# Patient Record
Sex: FEMALE | Race: WHITE | NOT HISPANIC OR LATINO | Employment: OTHER | URBAN - METROPOLITAN AREA
[De-identification: names, ages, dates, MRNs, and addresses within clinical notes are randomized per-mention and may not be internally consistent; named-entity substitution may affect disease eponyms.]

---

## 2017-02-12 ENCOUNTER — HOSPITAL ENCOUNTER (EMERGENCY)
Facility: HOSPITAL | Age: 67
Discharge: HOME | End: 2017-02-13
Attending: EMERGENCY MEDICINE | Admitting: EMERGENCY MEDICINE

## 2017-02-12 DIAGNOSIS — N20.1 URETEROLITHIASIS: Primary | ICD-10-CM

## 2017-02-12 PROCEDURE — 99283 EMERGENCY DEPT VISIT LOW MDM: CPT

## 2017-02-12 PROCEDURE — 93005 ELECTROCARDIOGRAM TRACING: CPT | Performed by: EMERGENCY MEDICINE

## 2017-02-12 RX ORDER — SODIUM CHLORIDE 0.9 % (FLUSH) 0.9 %
10 SYRINGE (ML) INJECTION AS NEEDED
Status: DISCONTINUED | OUTPATIENT
Start: 2017-02-12 | End: 2017-02-13 | Stop reason: HOSPADM

## 2017-02-12 RX ORDER — ALOGLIPTIN AND PIOGLITAZONE 25; 30 MG/1; MG/1
1 TABLET, FILM COATED ORAL DAILY
COMMUNITY
End: 2019-01-30 | Stop reason: HOSPADM

## 2017-02-12 RX ORDER — INSULIN GLARGINE 100 [IU]/ML
18 INJECTION, SOLUTION SUBCUTANEOUS NIGHTLY
Status: ON HOLD | COMMUNITY
End: 2019-01-30 | Stop reason: SDUPTHER

## 2017-02-12 RX ORDER — HYDROXYZINE PAMOATE 25 MG/1
100 CAPSULE ORAL NIGHTLY
Status: ON HOLD | COMMUNITY
End: 2022-09-22

## 2017-02-12 RX ORDER — TRAMADOL HYDROCHLORIDE 50 MG/1
150 TABLET ORAL NIGHTLY
COMMUNITY
End: 2019-01-30 | Stop reason: HOSPADM

## 2017-02-12 RX ORDER — HYDROMORPHONE HYDROCHLORIDE 1 MG/ML
0.25 INJECTION, SOLUTION INTRAMUSCULAR; INTRAVENOUS; SUBCUTANEOUS ONCE
Status: COMPLETED | OUTPATIENT
Start: 2017-02-12 | End: 2017-02-13

## 2017-02-13 ENCOUNTER — APPOINTMENT (OUTPATIENT)
Dept: CT IMAGING | Facility: HOSPITAL | Age: 67
End: 2017-02-13

## 2017-02-13 VITALS
RESPIRATION RATE: 16 BRPM | HEIGHT: 66 IN | TEMPERATURE: 97.6 F | DIASTOLIC BLOOD PRESSURE: 58 MMHG | OXYGEN SATURATION: 96 % | WEIGHT: 230 LBS | HEART RATE: 59 BPM | BODY MASS INDEX: 36.96 KG/M2 | SYSTOLIC BLOOD PRESSURE: 158 MMHG

## 2017-02-13 LAB
ALBUMIN SERPL-MCNC: 3.7 G/DL (ref 3.2–4.8)
ALBUMIN/GLOB SERPL: 1.4 G/DL (ref 1.5–2.5)
ALP SERPL-CCNC: 101 U/L (ref 25–100)
ALT SERPL W P-5'-P-CCNC: 16 U/L (ref 7–40)
ANION GAP SERPL CALCULATED.3IONS-SCNC: 4 MMOL/L (ref 3–11)
AST SERPL-CCNC: 27 U/L (ref 0–33)
BACTERIA UR QL AUTO: ABNORMAL /HPF
BASOPHILS # BLD AUTO: 0 10*3/MM3 (ref 0–0.2)
BASOPHILS NFR BLD AUTO: 0 % (ref 0–1)
BILIRUB SERPL-MCNC: 0.5 MG/DL (ref 0.3–1.2)
BILIRUB UR QL STRIP: NEGATIVE
BUN BLD-MCNC: 19 MG/DL (ref 9–23)
BUN/CREAT SERPL: 15.8 (ref 7–25)
CALCIUM SPEC-SCNC: 9.6 MG/DL (ref 8.7–10.4)
CHLORIDE SERPL-SCNC: 110 MMOL/L (ref 99–109)
CLARITY UR: CLEAR
CO2 SERPL-SCNC: 26 MMOL/L (ref 20–31)
COLOR UR: YELLOW
CREAT BLD-MCNC: 1.2 MG/DL (ref 0.6–1.3)
DEPRECATED RDW RBC AUTO: 42.3 FL (ref 37–54)
EOSINOPHIL # BLD AUTO: 0.1 10*3/MM3 (ref 0.1–0.3)
EOSINOPHIL NFR BLD AUTO: 2 % (ref 0–3)
ERYTHROCYTE [DISTWIDTH] IN BLOOD BY AUTOMATED COUNT: 13.4 % (ref 11.3–14.5)
GFR SERPL CREATININE-BSD FRML MDRD: 45 ML/MIN/1.73
GLOBULIN UR ELPH-MCNC: 2.7 GM/DL
GLUCOSE BLD-MCNC: 87 MG/DL (ref 70–100)
GLUCOSE UR STRIP-MCNC: NEGATIVE MG/DL
HCT VFR BLD AUTO: 39.6 % (ref 34.5–44)
HGB BLD-MCNC: 12.9 G/DL (ref 11.5–15.5)
HGB UR QL STRIP.AUTO: NEGATIVE
HYALINE CASTS UR QL AUTO: ABNORMAL /LPF
IMM GRANULOCYTES # BLD: 0 10*3/MM3 (ref 0–0.03)
IMM GRANULOCYTES NFR BLD: 0 % (ref 0–0.6)
KETONES UR QL STRIP: NEGATIVE
LEUKOCYTE ESTERASE UR QL STRIP.AUTO: ABNORMAL
LYMPHOCYTES # BLD AUTO: 1.59 10*3/MM3 (ref 0.6–4.8)
LYMPHOCYTES NFR BLD AUTO: 31.9 % (ref 24–44)
MCH RBC QN AUTO: 28.1 PG (ref 27–31)
MCHC RBC AUTO-ENTMCNC: 32.6 G/DL (ref 32–36)
MCV RBC AUTO: 86.3 FL (ref 80–99)
MONOCYTES # BLD AUTO: 0.72 10*3/MM3 (ref 0–1)
MONOCYTES NFR BLD AUTO: 14.5 % (ref 0–12)
NEUTROPHILS # BLD AUTO: 2.57 10*3/MM3 (ref 1.5–8.3)
NEUTROPHILS NFR BLD AUTO: 51.6 % (ref 41–71)
NITRITE UR QL STRIP: NEGATIVE
PH UR STRIP.AUTO: 7.5 [PH] (ref 5–8)
PLATELET # BLD AUTO: 207 10*3/MM3 (ref 150–450)
PMV BLD AUTO: 9.5 FL (ref 6–12)
POTASSIUM BLD-SCNC: 4.2 MMOL/L (ref 3.5–5.5)
PROT SERPL-MCNC: 6.4 G/DL (ref 5.7–8.2)
PROT UR QL STRIP: NEGATIVE
RBC # BLD AUTO: 4.59 10*6/MM3 (ref 3.89–5.14)
RBC # UR: ABNORMAL /HPF
REF LAB TEST METHOD: ABNORMAL
SODIUM BLD-SCNC: 140 MMOL/L (ref 132–146)
SP GR UR STRIP: 1.01 (ref 1–1.03)
SQUAMOUS #/AREA URNS HPF: ABNORMAL /HPF
UROBILINOGEN UR QL STRIP: ABNORMAL
WBC NRBC COR # BLD: 4.98 10*3/MM3 (ref 3.5–10.8)
WBC UR QL AUTO: ABNORMAL /HPF

## 2017-02-13 PROCEDURE — 87086 URINE CULTURE/COLONY COUNT: CPT | Performed by: EMERGENCY MEDICINE

## 2017-02-13 PROCEDURE — 96361 HYDRATE IV INFUSION ADD-ON: CPT

## 2017-02-13 PROCEDURE — 87186 SC STD MICRODIL/AGAR DIL: CPT | Performed by: EMERGENCY MEDICINE

## 2017-02-13 PROCEDURE — 96374 THER/PROPH/DIAG INJ IV PUSH: CPT

## 2017-02-13 PROCEDURE — 85025 COMPLETE CBC W/AUTO DIFF WBC: CPT | Performed by: EMERGENCY MEDICINE

## 2017-02-13 PROCEDURE — 81001 URINALYSIS AUTO W/SCOPE: CPT | Performed by: EMERGENCY MEDICINE

## 2017-02-13 PROCEDURE — 87077 CULTURE AEROBIC IDENTIFY: CPT | Performed by: EMERGENCY MEDICINE

## 2017-02-13 PROCEDURE — 74176 CT ABD & PELVIS W/O CONTRAST: CPT

## 2017-02-13 PROCEDURE — 25010000002 HYDROMORPHONE PER 4 MG: Performed by: EMERGENCY MEDICINE

## 2017-02-13 PROCEDURE — 80053 COMPREHEN METABOLIC PANEL: CPT | Performed by: EMERGENCY MEDICINE

## 2017-02-13 RX ORDER — ONDANSETRON 4 MG/1
4 TABLET, ORALLY DISINTEGRATING ORAL EVERY 6 HOURS PRN
Qty: 10 TABLET | Refills: 0 | Status: SHIPPED | OUTPATIENT
Start: 2017-02-13 | End: 2019-01-22

## 2017-02-13 RX ORDER — TAMSULOSIN HYDROCHLORIDE 0.4 MG/1
1 CAPSULE ORAL NIGHTLY
Qty: 30 CAPSULE | Refills: 0 | Status: SHIPPED | OUTPATIENT
Start: 2017-02-13 | End: 2019-01-22

## 2017-02-13 RX ORDER — OXYCODONE HYDROCHLORIDE AND ACETAMINOPHEN 5; 325 MG/1; MG/1
1 TABLET ORAL EVERY 4 HOURS PRN
Qty: 30 TABLET | Refills: 0 | Status: SHIPPED | OUTPATIENT
Start: 2017-02-13 | End: 2019-01-22

## 2017-02-13 RX ADMIN — SODIUM CHLORIDE 1000 ML: 9 INJECTION, SOLUTION INTRAVENOUS at 00:46

## 2017-02-13 RX ADMIN — HYDROMORPHONE HYDROCHLORIDE 0.25 MG: 1 INJECTION, SOLUTION INTRAMUSCULAR; INTRAVENOUS; SUBCUTANEOUS at 00:47

## 2017-02-13 NOTE — ED PROVIDER NOTES
Subjective   HPI Comments: Catherine Carrlol is a 66 y.o.female who presents to the ED with c/o flank pain. Today at 1800 she began having left sided flank pain that worsened with inspiration. Her pain continued throughout the day and when it did not resolve at 2200, she came to the ED for evaluation. She denies any NVD, recent trauma, strenuous activity or other acute complaints.     Hx of stage 3 CKD.     Patient is a 66 y.o. female presenting with flank pain.   History provided by:  Patient  Flank Pain   Pain location:  L flank  Pain radiates to:  Does not radiate  Pain severity:  Moderate  Onset quality:  Sudden  Duration: today at 1800.  Timing:  Constant  Progression:  Unchanged  Chronicity:  New  Relieved by:  Nothing  Worsened by:  Deep breathing  Associated symptoms: no anorexia, no chest pain, no diarrhea, no dysuria, no fever, no nausea, no shortness of breath and no vomiting        Review of Systems   Constitutional: Negative for fever.   Respiratory: Negative for shortness of breath.    Cardiovascular: Negative for chest pain.   Gastrointestinal: Negative for anorexia, diarrhea, nausea and vomiting.   Genitourinary: Positive for flank pain. Negative for dysuria.   All other systems reviewed and are negative.      Past Medical History   Diagnosis Date   • Acid reflux    • Alopecia      severe   • Anxiety    • Chronic kidney disease      NKF classification) See under renal insuff - renal MD has limited her to 50 gms prot/day - this will signif affect her ability to be successful with WLS and may incr her risk of leak (gerardo as her prealb already low) - she still adamantly wishes to proceed.  Also has proteinuria   • Depression    • Diabetes mellitus    • Dyspepsia    • Dyspnea on exertion    • Fatigue    • Hyperlipidemia    • Hypertension    • Hypoalbuminemia      3.1   • Insomnia    • Joint pain    • Morbid obesity      (SUPER)   • Nephrolithiasis    • Obstructive sleep apnea      prior to her orig Band in  2007 she had RAVEN on CPAP (309 lbs).     • Proteinuria    • PTSD (post-traumatic stress disorder)    • Renal insufficiency      related to nephrolithiasis.  GFR 45, (L) 40%, (R) 60%,  follows w/ Dr. Bunch   • Type 2 diabetes mellitus      dx 1997, on insulin 10+ yrs, A1c 9.5 5/14/15.     • Urolithiasis        Allergies   Allergen Reactions   • Abilify [Aripiprazole]    • Aminoglycosides    • Codeine    • Morphine And Related    • Nsaids        Past Surgical History   Procedure Laterality Date   • Cholecystectomy     • Cystoscopy w/ litholapaxy / ehl     • Cystoscopy     • Laparoscopic gastric banding       Adjustable Gastric Band s/p LAGB APL/ HHR 8/2011 by JSO    • Gastric sleeve laparoscopic       LAGB REG/ HHR 2/2007 by JSO   • Hip trocanteric nailing with intramedullary hip screw Right 9/28/2016     Procedure: HIP TROCANTERIC NAILING WITH INTRAMEDULLARY HIP SCREW;  Surgeon: Deshawn Solis MD;  Location:  [facility];  Service:    • Ulna/radius closed reduction Right 9/28/2016     Procedure: ULNA/RADIUS CLOSED REDUCTION;  Surgeon: Deshawn Solis MD;  Location:  [facility];  Service:    • Tonsillectomy  1957   • Breast lumpectomy Right 2010     benign cyst   • Laparoscopic cholecystectomy  2003     for gallstones   • Gastric banding removal  11/2010     by MIYA for intolerance    • Gastric port change       lapband port reposition and shortening of lapband tubing after a port flip in 11/2007 by JSO    • Kidney surgery Left 2003     kidney surgery for embedded stent removal; multiple kidney stone removals and stent placements     • Shoulder surgery Left 12/27/2013       History reviewed. No pertinent family history.    Social History     Social History   • Marital status: Single     Spouse name: N/A   • Number of children: N/A   • Years of education: N/A     Social History Main Topics   • Smoking status: Never Smoker   • Smokeless tobacco: None   • Alcohol use No   • Drug use: No   • Sexual activity: Not Asked      Other Topics Concern   • None     Social History Narrative         Objective   Physical Exam   Constitutional: She is oriented to person, place, and time. She appears well-developed and well-nourished.  Non-toxic appearance. No distress.   HENT:   Head: Normocephalic and atraumatic.   Right Ear: External ear normal.   Left Ear: External ear normal.   Nose: Nose normal.   Eyes: Conjunctivae, EOM and lids are normal. Pupils are equal, round, and reactive to light. No scleral icterus.   Neck: Normal range of motion. Neck supple. No tracheal deviation present.   Cardiovascular: Normal rate, regular rhythm and normal heart sounds.  Exam reveals no gallop, no friction rub and no decreased pulses.    No murmur heard.  Pulmonary/Chest: Effort normal and breath sounds normal. No respiratory distress. She has no decreased breath sounds. She has no wheezes. She has no rhonchi. She has no rales.   Abdominal: Soft. Normal appearance and bowel sounds are normal. There is no tenderness. There is no rebound and no guarding.   Left CVA tenderness, very soft mass near her left flank,    Musculoskeletal: Normal range of motion. She exhibits no edema or deformity.   Lymphadenopathy:     She has no cervical adenopathy.   Neurological: She is alert and oriented to person, place, and time. She has normal strength. No cranial nerve deficit or sensory deficit.   Skin: Skin is warm and dry. No rash noted. She is not diaphoretic.   Psychiatric: She has a normal mood and affect. Her speech is normal and behavior is normal. Judgment and thought content normal. Cognition and memory are normal.   Nursing note and vitals reviewed.      Procedures         ED Course  ED Course       Recent Results (from the past 24 hour(s))   Comprehensive Metabolic Panel    Collection Time: 02/13/17 12:08 AM   Result Value Ref Range    Glucose 87 70 - 100 mg/dL    BUN 19 9 - 23 mg/dL    Creatinine 1.20 0.60 - 1.30 mg/dL    Sodium 140 132 - 146 mmol/L     Potassium 4.2 3.5 - 5.5 mmol/L    Chloride 110 (H) 99 - 109 mmol/L    CO2 26.0 20.0 - 31.0 mmol/L    Calcium 9.6 8.7 - 10.4 mg/dL    Total Protein 6.4 5.7 - 8.2 g/dL    Albumin 3.70 3.20 - 4.80 g/dL    ALT (SGPT) 16 7 - 40 U/L    AST (SGOT) 27 0 - 33 U/L    Alkaline Phosphatase 101 (H) 25 - 100 U/L    Total Bilirubin 0.5 0.3 - 1.2 mg/dL    eGFR Non African Amer 45 (L) >60 mL/min/1.73    Globulin 2.7 gm/dL    A/G Ratio 1.4 (L) 1.5 - 2.5 g/dL    BUN/Creatinine Ratio 15.8 7.0 - 25.0    Anion Gap 4.0 3.0 - 11.0 mmol/L   Urinalysis With / Culture If Indicated    Collection Time: 02/13/17 12:08 AM   Result Value Ref Range    Color, UA Yellow Yellow, Straw    Appearance, UA Clear Clear    pH, UA 7.5 5.0 - 8.0    Specific Gravity, UA 1.015 1.005 - 1.030    Glucose, UA Negative Negative    Ketones, UA Negative Negative    Bilirubin, UA Negative Negative    Blood, UA Negative Negative    Protein, UA Negative Negative    Leuk Esterase, UA Trace (A) Negative    Nitrite, UA Negative Negative    Urobilinogen, UA 0.2 E.U./dL 0.2 - 1.0 E.U./dL   CBC Auto Differential    Collection Time: 02/13/17 12:08 AM   Result Value Ref Range    WBC 4.98 3.50 - 10.80 10*3/mm3    RBC 4.59 3.89 - 5.14 10*6/mm3    Hemoglobin 12.9 11.5 - 15.5 g/dL    Hematocrit 39.6 34.5 - 44.0 %    MCV 86.3 80.0 - 99.0 fL    MCH 28.1 27.0 - 31.0 pg    MCHC 32.6 32.0 - 36.0 g/dL    RDW 13.4 11.3 - 14.5 %    RDW-SD 42.3 37.0 - 54.0 fl    MPV 9.5 6.0 - 12.0 fL    Platelets 207 150 - 450 10*3/mm3    Neutrophil % 51.6 41.0 - 71.0 %    Lymphocyte % 31.9 24.0 - 44.0 %    Monocyte % 14.5 (H) 0.0 - 12.0 %    Eosinophil % 2.0 0.0 - 3.0 %    Basophil % 0.0 0.0 - 1.0 %    Immature Grans % 0.0 0.0 - 0.6 %    Neutrophils, Absolute 2.57 1.50 - 8.30 10*3/mm3    Lymphocytes, Absolute 1.59 0.60 - 4.80 10*3/mm3    Monocytes, Absolute 0.72 0.00 - 1.00 10*3/mm3    Eosinophils, Absolute 0.10 0.10 - 0.30 10*3/mm3    Basophils, Absolute 0.00 0.00 - 0.20 10*3/mm3    Immature Grans,  Absolute 0.00 0.00 - 0.03 10*3/mm3   Urinalysis, Microscopic Only    Collection Time: 02/13/17 12:08 AM   Result Value Ref Range    RBC, UA 0-2 None Seen, 0-2 /HPF    WBC, UA 6-12 (A) None Seen /HPF    Bacteria, UA None Seen None Seen, Trace /HPF    Squamous Epithelial Cells, UA 0-2 None Seen, 0-2 /HPF    Hyaline Casts, UA 0-6 0 - 6 /LPF    Methodology Manual Light Microscopy      Note: In addition to lab results from this visit, the labs listed above may include labs taken at another facility or during a different encounter within the last 24 hours. Please correlate lab times with ED admission and discharge times for further clarification of the services performed during this visit.    CT Abdomen Pelvis Without Contrast   Final Result   Abnormal   1.  Severe LEFT hydronephroureter secondary to two distal ureteral calculi, 0.4    cm and more distally 0.3 cm (approximately 3 cm from ureterovesicular    junction).     2.  Left perinephric fat stranding, possibly secondary to severe hydronephrosis    although superimposed pyelonephritis can't be excluded.     3.  Left lumbar hernia containing unobstructed colon splenic flexure.     4.  Punctate nonobstructing bilateral renal calculi.     5.  Colonic diverticula without diverticulitis.         THIS DOCUMENT HAS BEEN ELECTRONICALLY SIGNED BY JACE LANGSTON MD        Vitals:    02/13/17 0000 02/13/17 0009 02/13/17 0030 02/13/17 0122   BP: 172/76  177/80 140/65   BP Location:       Patient Position:       Pulse:  56 53    Resp:       Temp:       TempSrc:       SpO2: 99% 97% 98% 91%   Weight:       Height:         Medications   sodium chloride 0.9 % flush 10 mL (not administered)   sodium chloride 0.9 % bolus 1,000 mL (1,000 mL Intravenous New Bag 2/13/17 0046)   HYDROmorphone (DILAUDID) injection 0.25 mg (0.25 mg Intravenous Given 2/13/17 0047)     ECG/EMG Results (last 24 hours)     Procedure Component Value Units Date/Time    ECG 12 Lead [[id number]] Collected:  02/12/17  2346     Updated:  02/12/17 2353    Narrative:       Test Reason : left flank pain  Blood Pressure : **/** mmHG  Vent. Rate : 052 BPM     Atrial Rate : 052 BPM     P-R Int : 124 ms          QRS Dur : 096 ms      QT Int : 462 ms       P-R-T Axes : -01 -04 017 degrees     QTc Int : 429 ms    Sinus bradycardia  Otherwise normal ECG  When compared with ECG of 27-SEP-2016 18:13,  Vent. rate has decreased BY  26 BPM  QT has shortened  Confirmed by SKY KUMARI ([id number]) on 2/12/2017 11:53:29 PM    Referred By:  [facility]           Confirmed By:SKY KUMARI                    MDM  Number of Diagnoses or Management Options  Ureterolithiasis: new and requires workup  Diagnosis management comments: Patient has been identified to have two distal left ureteral stones, with hydro.     Pain resolved after small dose of dilaudid.     No infection in urine, no fever, normal wbc count.     Patient has been evaluated by Dr. Amaro in past for kidney stones.     Will DC with percocet, zofran, and flomax and refer to Dr. Amaro.        Amount and/or Complexity of Data Reviewed  Clinical lab tests: ordered and reviewed  Tests in the radiology section of CPT®: ordered and reviewed  Review and summarize past medical records: yes  Independent visualization of images, tracings, or specimens: yes    Patient Progress  Patient progress: stable      Final diagnoses:   Ureterolithiasis       Documentation assistance provided by arabella Bedoya.  Information recorded by the scribsesar was done at my direction and has been verified and validated by me.     Luther Bedoya  02/12/17 2300       Luther Bedoya  02/12/17 2329       Luther Bedoya  02/13/17 0142       Sky Kumari MD  02/13/17 0236

## 2017-02-15 LAB — BACTERIA SPEC AEROBE CULT: ABNORMAL

## 2017-02-16 ENCOUNTER — TELEPHONE (OUTPATIENT)
Dept: EMERGENCY DEPT | Facility: HOSPITAL | Age: 67
End: 2017-02-16

## 2017-12-23 ENCOUNTER — APPOINTMENT (OUTPATIENT)
Dept: GENERAL RADIOLOGY | Facility: HOSPITAL | Age: 67
End: 2017-12-23

## 2017-12-23 ENCOUNTER — HOSPITAL ENCOUNTER (EMERGENCY)
Facility: HOSPITAL | Age: 67
Discharge: HOME | End: 2017-12-23
Attending: EMERGENCY MEDICINE | Admitting: EMERGENCY MEDICINE

## 2017-12-23 VITALS
SYSTOLIC BLOOD PRESSURE: 160 MMHG | HEART RATE: 83 BPM | TEMPERATURE: 98.2 F | DIASTOLIC BLOOD PRESSURE: 86 MMHG | OXYGEN SATURATION: 96 % | BODY MASS INDEX: 47.09 KG/M2 | RESPIRATION RATE: 18 BRPM | WEIGHT: 293 LBS | HEIGHT: 66 IN

## 2017-12-23 DIAGNOSIS — M19.079 ARTHRITIS OF ANKLE: Primary | ICD-10-CM

## 2017-12-23 DIAGNOSIS — M17.11 OSTEOARTHRITIS OF RIGHT KNEE, UNSPECIFIED OSTEOARTHRITIS TYPE: ICD-10-CM

## 2017-12-23 PROCEDURE — 73610 X-RAY EXAM OF ANKLE: CPT

## 2017-12-23 PROCEDURE — 73560 X-RAY EXAM OF KNEE 1 OR 2: CPT

## 2017-12-23 PROCEDURE — 99283 EMERGENCY DEPT VISIT LOW MDM: CPT

## 2017-12-23 RX ORDER — POTASSIUM CHLORIDE 750 MG/1
10 CAPSULE, EXTENDED RELEASE ORAL 2 TIMES DAILY
COMMUNITY
End: 2019-01-22

## 2017-12-23 RX ORDER — PRAZOSIN HYDROCHLORIDE 1 MG/1
1 CAPSULE ORAL NIGHTLY
COMMUNITY
End: 2019-01-22

## 2019-01-22 ENCOUNTER — APPOINTMENT (OUTPATIENT)
Dept: GENERAL RADIOLOGY | Facility: HOSPITAL | Age: 69
End: 2019-01-22

## 2019-01-22 ENCOUNTER — HOSPITAL ENCOUNTER (INPATIENT)
Facility: HOSPITAL | Age: 69
LOS: 8 days | Discharge: NURSING, REHAB OR LONG-TERM CARE | End: 2019-01-30
Attending: EMERGENCY MEDICINE | Admitting: INTERNAL MEDICINE

## 2019-01-22 DIAGNOSIS — Z74.09 IMPAIRED MOBILITY AND ADLS: ICD-10-CM

## 2019-01-22 DIAGNOSIS — Z74.09 IMPAIRED FUNCTIONAL MOBILITY, BALANCE, GAIT, AND ENDURANCE: ICD-10-CM

## 2019-01-22 DIAGNOSIS — Z78.9 IMPAIRED MOBILITY AND ADLS: ICD-10-CM

## 2019-01-22 DIAGNOSIS — S72.142A CLOSED DISPLACED INTERTROCHANTERIC FRACTURE OF LEFT FEMUR, INITIAL ENCOUNTER (HCC): Primary | ICD-10-CM

## 2019-01-22 PROBLEM — G47.33 OSA (OBSTRUCTIVE SLEEP APNEA): Status: ACTIVE | Noted: 2019-01-22

## 2019-01-22 PROBLEM — F41.1 GENERALIZED ANXIETY DISORDER: Chronic | Status: ACTIVE | Noted: 2019-01-22

## 2019-01-22 PROBLEM — E78.5 HYPERLIPIDEMIA: Chronic | Status: ACTIVE | Noted: 2019-01-22

## 2019-01-22 PROBLEM — G47.33 OSA (OBSTRUCTIVE SLEEP APNEA): Chronic | Status: ACTIVE | Noted: 2019-01-22

## 2019-01-22 PROBLEM — E66.01 SEVERE OBESITY (BMI 35.0-35.9 WITH COMORBIDITY) (HCC): Status: ACTIVE | Noted: 2019-01-22
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
## 2019-01-22 PROBLEM — I10 ESSENTIAL HYPERTENSION: Status: ACTIVE | Noted: 2019-01-22

## 2019-01-22 PROBLEM — I10 ESSENTIAL HYPERTENSION: Chronic | Status: ACTIVE | Noted: 2019-01-22

## 2019-01-22 PROBLEM — S72.002A CLOSED LEFT HIP FRACTURE (HCC): Status: ACTIVE | Noted: 2019-01-22

## 2019-01-22 PROBLEM — F41.1 GENERALIZED ANXIETY DISORDER: Status: ACTIVE | Noted: 2019-01-22

## 2019-01-22 PROBLEM — E78.5 HYPERLIPIDEMIA: Status: ACTIVE | Noted: 2019-01-22

## 2019-01-22 LAB
ABO GROUP BLD: NORMAL
BLD GP AB SCN SERPL QL: NEGATIVE
RH BLD: POSITIVE
T&S EXPIRATION DATE: NORMAL

## 2019-01-22 PROCEDURE — 86900 BLOOD TYPING SEROLOGIC ABO: CPT | Performed by: ORTHOPAEDIC SURGERY

## 2019-01-22 PROCEDURE — 25010000002 HYDROMORPHONE 1 MG/ML SOLUTION: Performed by: EMERGENCY MEDICINE

## 2019-01-22 PROCEDURE — 73560 X-RAY EXAM OF KNEE 1 OR 2: CPT

## 2019-01-22 PROCEDURE — 25010000002 ONDANSETRON PER 1 MG: Performed by: EMERGENCY MEDICINE

## 2019-01-22 PROCEDURE — 86850 RBC ANTIBODY SCREEN: CPT | Performed by: ORTHOPAEDIC SURGERY

## 2019-01-22 PROCEDURE — 99223 1ST HOSP IP/OBS HIGH 75: CPT | Performed by: FAMILY MEDICINE

## 2019-01-22 PROCEDURE — 25010000002 HYDROMORPHONE PER 4 MG: Performed by: EMERGENCY MEDICINE

## 2019-01-22 PROCEDURE — 25010000002 HYDROMORPHONE PER 4 MG: Performed by: FAMILY MEDICINE

## 2019-01-22 PROCEDURE — 99284 EMERGENCY DEPT VISIT MOD MDM: CPT

## 2019-01-22 PROCEDURE — 86901 BLOOD TYPING SEROLOGIC RH(D): CPT | Performed by: ORTHOPAEDIC SURGERY

## 2019-01-22 PROCEDURE — 73502 X-RAY EXAM HIP UNI 2-3 VIEWS: CPT

## 2019-01-22 PROCEDURE — 71045 X-RAY EXAM CHEST 1 VIEW: CPT

## 2019-01-22 RX ORDER — DOCUSATE SODIUM 100 MG/1
100 CAPSULE, LIQUID FILLED ORAL 2 TIMES DAILY
Status: DISCONTINUED | OUTPATIENT
Start: 2019-01-22 | End: 2019-01-30 | Stop reason: HOSPADM

## 2019-01-22 RX ORDER — MELATONIN
1000 DAILY
Status: DISCONTINUED | OUTPATIENT
Start: 2019-01-23 | End: 2019-01-30 | Stop reason: HOSPADM

## 2019-01-22 RX ORDER — CETIRIZINE HYDROCHLORIDE 10 MG/1
10 TABLET ORAL DAILY
COMMUNITY
End: 2019-01-22

## 2019-01-22 RX ORDER — ONDANSETRON 2 MG/ML
4 INJECTION INTRAMUSCULAR; INTRAVENOUS EVERY 6 HOURS PRN
Status: DISCONTINUED | OUTPATIENT
Start: 2019-01-22 | End: 2019-01-27

## 2019-01-22 RX ORDER — ACETAMINOPHEN 325 MG/1
650 TABLET ORAL EVERY 4 HOURS PRN
Status: DISCONTINUED | OUTPATIENT
Start: 2019-01-22 | End: 2019-01-30 | Stop reason: HOSPADM

## 2019-01-22 RX ORDER — MULTIPLE VITAMINS W/ MINERALS TAB 9MG-400MCG
1 TAB ORAL DAILY
Status: DISCONTINUED | OUTPATIENT
Start: 2019-01-23 | End: 2019-01-30 | Stop reason: HOSPADM

## 2019-01-22 RX ORDER — ONDANSETRON 2 MG/ML
4 INJECTION INTRAMUSCULAR; INTRAVENOUS ONCE
Status: COMPLETED | OUTPATIENT
Start: 2019-01-22 | End: 2019-01-22

## 2019-01-22 RX ORDER — ATORVASTATIN CALCIUM 40 MG/1
40 TABLET, FILM COATED ORAL NIGHTLY
Status: DISCONTINUED | OUTPATIENT
Start: 2019-01-22 | End: 2019-01-30 | Stop reason: HOSPADM

## 2019-01-22 RX ORDER — BUPROPION HYDROCHLORIDE 150 MG/1
450 TABLET ORAL DAILY
Status: DISCONTINUED | OUTPATIENT
Start: 2019-01-23 | End: 2019-01-30 | Stop reason: HOSPADM

## 2019-01-22 RX ORDER — FLUOXETINE HYDROCHLORIDE 20 MG/1
40 CAPSULE ORAL DAILY
Status: DISCONTINUED | OUTPATIENT
Start: 2019-01-23 | End: 2019-01-30 | Stop reason: HOSPADM

## 2019-01-22 RX ORDER — ONDANSETRON 4 MG/1
4 TABLET, FILM COATED ORAL EVERY 6 HOURS PRN
Status: DISCONTINUED | OUTPATIENT
Start: 2019-01-22 | End: 2019-01-27

## 2019-01-22 RX ORDER — SODIUM CHLORIDE 9 MG/ML
50 INJECTION, SOLUTION INTRAVENOUS CONTINUOUS
Status: DISCONTINUED | OUTPATIENT
Start: 2019-01-22 | End: 2019-01-23

## 2019-01-22 RX ORDER — HYDROMORPHONE HYDROCHLORIDE 1 MG/ML
0.5 INJECTION, SOLUTION INTRAMUSCULAR; INTRAVENOUS; SUBCUTANEOUS ONCE
Status: COMPLETED | OUTPATIENT
Start: 2019-01-22 | End: 2019-01-22

## 2019-01-22 RX ORDER — FLUOXETINE HYDROCHLORIDE 20 MG/1
40 CAPSULE ORAL EVERY MORNING
COMMUNITY

## 2019-01-22 RX ORDER — HYDROXYZINE HYDROCHLORIDE 25 MG/1
100 TABLET, FILM COATED ORAL NIGHTLY PRN
Status: DISCONTINUED | OUTPATIENT
Start: 2019-01-22 | End: 2019-01-27

## 2019-01-22 RX ORDER — HYDROMORPHONE HYDROCHLORIDE 1 MG/ML
0.5 INJECTION, SOLUTION INTRAMUSCULAR; INTRAVENOUS; SUBCUTANEOUS
Status: DISCONTINUED | OUTPATIENT
Start: 2019-01-22 | End: 2019-01-28

## 2019-01-22 RX ORDER — BISACODYL 10 MG
10 SUPPOSITORY, RECTAL RECTAL DAILY PRN
Status: DISCONTINUED | OUTPATIENT
Start: 2019-01-22 | End: 2019-01-27

## 2019-01-22 RX ORDER — NALOXONE HCL 0.4 MG/ML
0.4 VIAL (ML) INJECTION
Status: DISCONTINUED | OUTPATIENT
Start: 2019-01-22 | End: 2019-01-30 | Stop reason: HOSPADM

## 2019-01-22 RX ORDER — MELATONIN
1000 DAILY
Status: ON HOLD | COMMUNITY
End: 2022-09-22

## 2019-01-22 RX ADMIN — HYDROXYZINE HYDROCHLORIDE 100 MG: 25 TABLET, FILM COATED ORAL at 21:35

## 2019-01-22 RX ADMIN — HYDROMORPHONE HYDROCHLORIDE 1 MG: 1 INJECTION, SOLUTION INTRAMUSCULAR; INTRAVENOUS; SUBCUTANEOUS at 17:09

## 2019-01-22 RX ADMIN — ONDANSETRON 4 MG: 2 INJECTION INTRAMUSCULAR; INTRAVENOUS at 17:07

## 2019-01-22 RX ADMIN — HYDROMORPHONE HYDROCHLORIDE 0.5 MG: 1 INJECTION, SOLUTION INTRAMUSCULAR; INTRAVENOUS; SUBCUTANEOUS at 19:56

## 2019-01-22 RX ADMIN — DOCUSATE SODIUM 100 MG: 100 CAPSULE, LIQUID FILLED ORAL at 23:15

## 2019-01-22 RX ADMIN — ATORVASTATIN CALCIUM 40 MG: 40 TABLET, FILM COATED ORAL at 23:15

## 2019-01-22 RX ADMIN — HYDROMORPHONE HYDROCHLORIDE 0.5 MG: 1 INJECTION, SOLUTION INTRAMUSCULAR; INTRAVENOUS; SUBCUTANEOUS at 21:35

## 2019-01-23 ENCOUNTER — ANESTHESIA (OUTPATIENT)
Dept: PERIOP | Facility: HOSPITAL | Age: 69
End: 2019-01-23

## 2019-01-23 ENCOUNTER — APPOINTMENT (OUTPATIENT)
Dept: GENERAL RADIOLOGY | Facility: HOSPITAL | Age: 69
End: 2019-01-23

## 2019-01-23 ENCOUNTER — ANESTHESIA EVENT (OUTPATIENT)
Dept: PERIOP | Facility: HOSPITAL | Age: 69
End: 2019-01-23

## 2019-01-23 LAB
ANION GAP SERPL CALCULATED.3IONS-SCNC: 5 MMOL/L (ref 3–11)
BACTERIA UR QL AUTO: NORMAL /HPF
BASOPHILS # BLD AUTO: 0.01 10*3/MM3 (ref 0–0.2)
BASOPHILS NFR BLD AUTO: 0.1 % (ref 0–1)
BILIRUB UR QL STRIP: NEGATIVE
BUN BLD-MCNC: 23 MG/DL (ref 9–23)
BUN/CREAT SERPL: 16.7 (ref 7–25)
CALCIUM SPEC-SCNC: 8.4 MG/DL (ref 8.7–10.4)
CHLORIDE SERPL-SCNC: 107 MMOL/L (ref 99–109)
CLARITY UR: CLEAR
CO2 SERPL-SCNC: 26 MMOL/L (ref 20–31)
COLOR UR: YELLOW
CREAT BLD-MCNC: 1.38 MG/DL (ref 0.6–1.3)
DEPRECATED RDW RBC AUTO: 38.5 FL (ref 37–54)
EOSINOPHIL # BLD AUTO: 0.01 10*3/MM3 (ref 0–0.3)
EOSINOPHIL NFR BLD AUTO: 0.1 % (ref 0–3)
ERYTHROCYTE [DISTWIDTH] IN BLOOD BY AUTOMATED COUNT: 12.6 % (ref 11.3–14.5)
GFR SERPL CREATININE-BSD FRML MDRD: 38 ML/MIN/1.73
GLUCOSE BLD-MCNC: 248 MG/DL (ref 70–100)
GLUCOSE BLDC GLUCOMTR-MCNC: 207 MG/DL (ref 70–130)
GLUCOSE BLDC GLUCOMTR-MCNC: 271 MG/DL (ref 70–130)
GLUCOSE UR STRIP-MCNC: ABNORMAL MG/DL
HBA1C MFR BLD: 7 % (ref 4.8–5.6)
HCT VFR BLD AUTO: 36.2 % (ref 34.5–44)
HGB BLD-MCNC: 11.7 G/DL (ref 11.5–15.5)
HGB UR QL STRIP.AUTO: NEGATIVE
HYALINE CASTS UR QL AUTO: NORMAL /LPF
IMM GRANULOCYTES # BLD AUTO: 0.02 10*3/MM3 (ref 0–0.03)
IMM GRANULOCYTES NFR BLD AUTO: 0.3 % (ref 0–0.6)
KETONES UR QL STRIP: ABNORMAL
LEUKOCYTE ESTERASE UR QL STRIP.AUTO: NEGATIVE
LYMPHOCYTES # BLD AUTO: 1.61 10*3/MM3 (ref 0.6–4.8)
LYMPHOCYTES NFR BLD AUTO: 20.7 % (ref 24–44)
MCH RBC QN AUTO: 27.5 PG (ref 27–31)
MCHC RBC AUTO-ENTMCNC: 32.3 G/DL (ref 32–36)
MCV RBC AUTO: 85 FL (ref 80–99)
MONOCYTES # BLD AUTO: 1.28 10*3/MM3 (ref 0–1)
MONOCYTES NFR BLD AUTO: 16.5 % (ref 0–12)
NEUTROPHILS # BLD AUTO: 4.86 10*3/MM3 (ref 1.5–8.3)
NEUTROPHILS NFR BLD AUTO: 62.6 % (ref 41–71)
NITRITE UR QL STRIP: NEGATIVE
PH UR STRIP.AUTO: <=5 [PH] (ref 5–8)
PLATELET # BLD AUTO: 155 10*3/MM3 (ref 150–450)
PMV BLD AUTO: 9.6 FL (ref 6–12)
POTASSIUM BLD-SCNC: 4.1 MMOL/L (ref 3.5–5.5)
PROT UR QL STRIP: ABNORMAL
RBC # BLD AUTO: 4.26 10*6/MM3 (ref 3.89–5.14)
RBC # UR: NORMAL /HPF
REF LAB TEST METHOD: NORMAL
SODIUM BLD-SCNC: 138 MMOL/L (ref 132–146)
SP GR UR STRIP: 1.02 (ref 1–1.03)
SQUAMOUS #/AREA URNS HPF: NORMAL /HPF
UROBILINOGEN UR QL STRIP: ABNORMAL
WBC NRBC COR # BLD: 7.77 10*3/MM3 (ref 3.5–10.8)
WBC UR QL AUTO: NORMAL /HPF

## 2019-01-23 PROCEDURE — 73630 X-RAY EXAM OF FOOT: CPT

## 2019-01-23 PROCEDURE — 25010000003 CEFAZOLIN PER 500 MG: Performed by: ANESTHESIOLOGY

## 2019-01-23 PROCEDURE — C1713 ANCHOR/SCREW BN/BN,TIS/BN: HCPCS | Performed by: ORTHOPAEDIC SURGERY

## 2019-01-23 PROCEDURE — 25010000002 ROPIVACAINE PER 1 MG: Performed by: ANESTHESIOLOGY

## 2019-01-23 PROCEDURE — 83036 HEMOGLOBIN GLYCOSYLATED A1C: CPT | Performed by: FAMILY MEDICINE

## 2019-01-23 PROCEDURE — 85025 COMPLETE CBC W/AUTO DIFF WBC: CPT | Performed by: FAMILY MEDICINE

## 2019-01-23 PROCEDURE — 25010000002 PROPOFOL 10 MG/ML EMULSION: Performed by: ANESTHESIOLOGY

## 2019-01-23 PROCEDURE — 81001 URINALYSIS AUTO W/SCOPE: CPT | Performed by: FAMILY MEDICINE

## 2019-01-23 PROCEDURE — 93005 ELECTROCARDIOGRAM TRACING: CPT | Performed by: FAMILY MEDICINE

## 2019-01-23 PROCEDURE — 63710000001 INSULIN LISPRO (HUMAN) PER 5 UNITS: Performed by: INTERNAL MEDICINE

## 2019-01-23 PROCEDURE — 0QS704Z REPOSITION LEFT UPPER FEMUR WITH INTERNAL FIXATION DEVICE, OPEN APPROACH: ICD-10-PCS | Performed by: ORTHOPAEDIC SURGERY

## 2019-01-23 PROCEDURE — 25010000002 NEOSTIGMINE 10 MG/10ML SOLUTION: Performed by: ANESTHESIOLOGY

## 2019-01-23 PROCEDURE — 82962 GLUCOSE BLOOD TEST: CPT

## 2019-01-23 PROCEDURE — 93010 ELECTROCARDIOGRAM REPORT: CPT | Performed by: INTERNAL MEDICINE

## 2019-01-23 PROCEDURE — 25010000002 ONDANSETRON PER 1 MG: Performed by: ANESTHESIOLOGY

## 2019-01-23 PROCEDURE — 25010000002 FENTANYL CITRATE (PF) 100 MCG/2ML SOLUTION: Performed by: ANESTHESIOLOGY

## 2019-01-23 PROCEDURE — 99232 SBSQ HOSP IP/OBS MODERATE 35: CPT | Performed by: INTERNAL MEDICINE

## 2019-01-23 PROCEDURE — C1769 GUIDE WIRE: HCPCS | Performed by: ORTHOPAEDIC SURGERY

## 2019-01-23 PROCEDURE — 80048 BASIC METABOLIC PNL TOTAL CA: CPT | Performed by: FAMILY MEDICINE

## 2019-01-23 PROCEDURE — 25010000002 HYDRALAZINE PER 20 MG: Performed by: ANESTHESIOLOGY

## 2019-01-23 PROCEDURE — 25010000002 MIDAZOLAM PER 1 MG: Performed by: ANESTHESIOLOGY

## 2019-01-23 PROCEDURE — 76000 FLUOROSCOPY <1 HR PHYS/QHP: CPT

## 2019-01-23 DEVICE — SCRW LK STRDRV TI 5X38M STRL: Type: IMPLANTABLE DEVICE | Site: HIP | Status: FUNCTIONAL

## 2019-01-23 DEVICE — NAIL FEM TFN ADV PROX 130D SHT 11X170MM STRL: Type: IMPLANTABLE DEVICE | Status: FUNCTIONAL

## 2019-01-23 DEVICE — BLD FEM FIX HELI TFN ADV PERF 100MM STRL: Type: IMPLANTABLE DEVICE | Status: FUNCTIONAL

## 2019-01-23 RX ORDER — NALOXONE HCL 0.4 MG/ML
0.1 VIAL (ML) INJECTION
Status: DISCONTINUED | OUTPATIENT
Start: 2019-01-23 | End: 2019-01-27

## 2019-01-23 RX ORDER — ONDANSETRON 2 MG/ML
4 INJECTION INTRAMUSCULAR; INTRAVENOUS EVERY 6 HOURS PRN
Status: DISCONTINUED | OUTPATIENT
Start: 2019-01-23 | End: 2019-01-27

## 2019-01-23 RX ORDER — NEOSTIGMINE METHYLSULFATE 1 MG/ML
INJECTION, SOLUTION INTRAVENOUS AS NEEDED
Status: DISCONTINUED | OUTPATIENT
Start: 2019-01-23 | End: 2019-01-23 | Stop reason: SURG

## 2019-01-23 RX ORDER — BISACODYL 10 MG
10 SUPPOSITORY, RECTAL RECTAL DAILY PRN
Status: DISCONTINUED | OUTPATIENT
Start: 2019-01-23 | End: 2019-01-30 | Stop reason: HOSPADM

## 2019-01-23 RX ORDER — ACETAMINOPHEN 160 MG/5ML
650 SOLUTION ORAL EVERY 4 HOURS PRN
Status: DISCONTINUED | OUTPATIENT
Start: 2019-01-23 | End: 2019-01-27

## 2019-01-23 RX ORDER — FAMOTIDINE 20 MG/1
20 TABLET, FILM COATED ORAL ONCE
Status: COMPLETED | OUTPATIENT
Start: 2019-01-23 | End: 2019-01-23

## 2019-01-23 RX ORDER — FAMOTIDINE 10 MG/ML
20 INJECTION, SOLUTION INTRAVENOUS ONCE
Status: CANCELLED | OUTPATIENT
Start: 2019-01-23 | End: 2019-01-23

## 2019-01-23 RX ORDER — CEFAZOLIN SODIUM 2 G/100ML
2 INJECTION, SOLUTION INTRAVENOUS ONCE
Status: DISCONTINUED | OUTPATIENT
Start: 2019-01-23 | End: 2019-01-23 | Stop reason: HOSPADM

## 2019-01-23 RX ORDER — BUPIVACAINE HYDROCHLORIDE 2.5 MG/ML
INJECTION, SOLUTION EPIDURAL; INFILTRATION; INTRACAUDAL
Status: COMPLETED | OUTPATIENT
Start: 2019-01-23 | End: 2019-01-23

## 2019-01-23 RX ORDER — ACETAMINOPHEN 650 MG/1
650 SUPPOSITORY RECTAL EVERY 4 HOURS PRN
Status: DISCONTINUED | OUTPATIENT
Start: 2019-01-23 | End: 2019-01-27

## 2019-01-23 RX ORDER — NICOTINE POLACRILEX 4 MG
15 LOZENGE BUCCAL
Status: DISCONTINUED | OUTPATIENT
Start: 2019-01-23 | End: 2019-01-30 | Stop reason: HOSPADM

## 2019-01-23 RX ORDER — ONDANSETRON 2 MG/ML
INJECTION INTRAMUSCULAR; INTRAVENOUS AS NEEDED
Status: DISCONTINUED | OUTPATIENT
Start: 2019-01-23 | End: 2019-01-23 | Stop reason: SURG

## 2019-01-23 RX ORDER — SODIUM CHLORIDE, SODIUM LACTATE, POTASSIUM CHLORIDE, CALCIUM CHLORIDE 600; 310; 30; 20 MG/100ML; MG/100ML; MG/100ML; MG/100ML
9 INJECTION, SOLUTION INTRAVENOUS CONTINUOUS
Status: DISCONTINUED | OUTPATIENT
Start: 2019-01-23 | End: 2019-01-28

## 2019-01-23 RX ORDER — SENNA AND DOCUSATE SODIUM 50; 8.6 MG/1; MG/1
2 TABLET, FILM COATED ORAL 2 TIMES DAILY PRN
Status: DISCONTINUED | OUTPATIENT
Start: 2019-01-23 | End: 2019-01-30 | Stop reason: HOSPADM

## 2019-01-23 RX ORDER — ONDANSETRON 4 MG/1
4 TABLET, FILM COATED ORAL EVERY 6 HOURS PRN
Status: DISCONTINUED | OUTPATIENT
Start: 2019-01-23 | End: 2019-01-30 | Stop reason: HOSPADM

## 2019-01-23 RX ORDER — BISACODYL 5 MG/1
10 TABLET, DELAYED RELEASE ORAL DAILY PRN
Status: DISCONTINUED | OUTPATIENT
Start: 2019-01-23 | End: 2019-01-30 | Stop reason: HOSPADM

## 2019-01-23 RX ORDER — HYDROMORPHONE HYDROCHLORIDE 1 MG/ML
0.5 INJECTION, SOLUTION INTRAMUSCULAR; INTRAVENOUS; SUBCUTANEOUS
Status: DISCONTINUED | OUTPATIENT
Start: 2019-01-23 | End: 2019-01-27

## 2019-01-23 RX ORDER — HYDRALAZINE HYDROCHLORIDE 20 MG/ML
INJECTION INTRAMUSCULAR; INTRAVENOUS AS NEEDED
Status: DISCONTINUED | OUTPATIENT
Start: 2019-01-23 | End: 2019-01-23 | Stop reason: SURG

## 2019-01-23 RX ORDER — MEPERIDINE HYDROCHLORIDE 50 MG/ML
50 INJECTION INTRAMUSCULAR; INTRAVENOUS; SUBCUTANEOUS ONCE
Status: COMPLETED | OUTPATIENT
Start: 2019-01-23 | End: 2019-01-23

## 2019-01-23 RX ORDER — LIDOCAINE HYDROCHLORIDE 10 MG/ML
INJECTION, SOLUTION INFILTRATION; PERINEURAL AS NEEDED
Status: DISCONTINUED | OUTPATIENT
Start: 2019-01-23 | End: 2019-01-23 | Stop reason: SURG

## 2019-01-23 RX ORDER — HYDROCODONE BITARTRATE AND ACETAMINOPHEN 7.5; 325 MG/1; MG/1
2 TABLET ORAL EVERY 4 HOURS PRN
Status: DISCONTINUED | OUTPATIENT
Start: 2019-01-23 | End: 2019-01-28

## 2019-01-23 RX ORDER — DOCUSATE SODIUM 100 MG/1
100 CAPSULE, LIQUID FILLED ORAL 2 TIMES DAILY PRN
Status: DISCONTINUED | OUTPATIENT
Start: 2019-01-23 | End: 2019-01-30 | Stop reason: HOSPADM

## 2019-01-23 RX ORDER — DEXTROSE MONOHYDRATE 25 G/50ML
25 INJECTION, SOLUTION INTRAVENOUS
Status: DISCONTINUED | OUTPATIENT
Start: 2019-01-23 | End: 2019-01-30 | Stop reason: HOSPADM

## 2019-01-23 RX ORDER — GLYCOPYRROLATE 0.2 MG/ML
INJECTION INTRAMUSCULAR; INTRAVENOUS AS NEEDED
Status: DISCONTINUED | OUTPATIENT
Start: 2019-01-23 | End: 2019-01-23 | Stop reason: SURG

## 2019-01-23 RX ORDER — CEFAZOLIN SODIUM 1 G/3ML
INJECTION, POWDER, FOR SOLUTION INTRAMUSCULAR; INTRAVENOUS AS NEEDED
Status: DISCONTINUED | OUTPATIENT
Start: 2019-01-23 | End: 2019-01-23 | Stop reason: SURG

## 2019-01-23 RX ORDER — ROPIVACAINE HYDROCHLORIDE 2 MG/ML
10 INJECTION, SOLUTION EPIDURAL; INFILTRATION CONTINUOUS
Status: DISCONTINUED | OUTPATIENT
Start: 2019-01-23 | End: 2019-01-28

## 2019-01-23 RX ORDER — SODIUM CHLORIDE 0.9 % (FLUSH) 0.9 %
3-10 SYRINGE (ML) INJECTION AS NEEDED
Status: DISCONTINUED | OUTPATIENT
Start: 2019-01-23 | End: 2019-01-23 | Stop reason: HOSPADM

## 2019-01-23 RX ORDER — MIDAZOLAM HYDROCHLORIDE 1 MG/ML
INJECTION INTRAMUSCULAR; INTRAVENOUS
Status: COMPLETED | OUTPATIENT
Start: 2019-01-23 | End: 2019-01-23

## 2019-01-23 RX ORDER — FENTANYL CITRATE 50 UG/ML
INJECTION, SOLUTION INTRAMUSCULAR; INTRAVENOUS
Status: COMPLETED | OUTPATIENT
Start: 2019-01-23 | End: 2019-01-23

## 2019-01-23 RX ORDER — HYDROCODONE BITARTRATE AND ACETAMINOPHEN 7.5; 325 MG/1; MG/1
1 TABLET ORAL EVERY 4 HOURS PRN
Status: DISCONTINUED | OUTPATIENT
Start: 2019-01-23 | End: 2019-01-30 | Stop reason: HOSPADM

## 2019-01-23 RX ORDER — LIDOCAINE HYDROCHLORIDE 10 MG/ML
0.5 INJECTION, SOLUTION EPIDURAL; INFILTRATION; INTRACAUDAL; PERINEURAL ONCE AS NEEDED
Status: COMPLETED | OUTPATIENT
Start: 2019-01-23 | End: 2019-01-23

## 2019-01-23 RX ORDER — SODIUM CHLORIDE 9 MG/ML
100 INJECTION, SOLUTION INTRAVENOUS CONTINUOUS
Status: DISCONTINUED | OUTPATIENT
Start: 2019-01-23 | End: 2019-01-28

## 2019-01-23 RX ORDER — SODIUM CHLORIDE, SODIUM LACTATE, POTASSIUM CHLORIDE, CALCIUM CHLORIDE 600; 310; 30; 20 MG/100ML; MG/100ML; MG/100ML; MG/100ML
INJECTION, SOLUTION INTRAVENOUS CONTINUOUS PRN
Status: DISCONTINUED | OUTPATIENT
Start: 2019-01-23 | End: 2019-01-23 | Stop reason: SURG

## 2019-01-23 RX ORDER — ATRACURIUM BESYLATE 10 MG/ML
INJECTION, SOLUTION INTRAVENOUS AS NEEDED
Status: DISCONTINUED | OUTPATIENT
Start: 2019-01-23 | End: 2019-01-23 | Stop reason: SURG

## 2019-01-23 RX ORDER — MAGNESIUM HYDROXIDE 1200 MG/15ML
LIQUID ORAL AS NEEDED
Status: DISCONTINUED | OUTPATIENT
Start: 2019-01-23 | End: 2019-01-23 | Stop reason: HOSPADM

## 2019-01-23 RX ORDER — ROPIVACAINE HYDROCHLORIDE 2 MG/ML
8 INJECTION, SOLUTION EPIDURAL; INFILTRATION CONTINUOUS
Status: DISCONTINUED | OUTPATIENT
Start: 2019-01-23 | End: 2019-01-28

## 2019-01-23 RX ORDER — DIPHENHYDRAMINE HYDROCHLORIDE 50 MG/ML
25 INJECTION INTRAMUSCULAR; INTRAVENOUS EVERY 6 HOURS PRN
Status: DISCONTINUED | OUTPATIENT
Start: 2019-01-23 | End: 2019-01-27

## 2019-01-23 RX ORDER — ACETAMINOPHEN 325 MG/1
650 TABLET ORAL EVERY 4 HOURS PRN
Status: DISCONTINUED | OUTPATIENT
Start: 2019-01-23 | End: 2019-01-27

## 2019-01-23 RX ORDER — PROPOFOL 10 MG/ML
VIAL (ML) INTRAVENOUS AS NEEDED
Status: DISCONTINUED | OUTPATIENT
Start: 2019-01-23 | End: 2019-01-23 | Stop reason: SURG

## 2019-01-23 RX ORDER — SODIUM CHLORIDE 0.9 % (FLUSH) 0.9 %
3 SYRINGE (ML) INJECTION EVERY 12 HOURS SCHEDULED
Status: DISCONTINUED | OUTPATIENT
Start: 2019-01-23 | End: 2019-01-23 | Stop reason: HOSPADM

## 2019-01-23 RX ORDER — TRANEXAMIC ACID 100 MG/ML
INJECTION, SOLUTION INTRAVENOUS AS NEEDED
Status: DISCONTINUED | OUTPATIENT
Start: 2019-01-23 | End: 2019-01-23 | Stop reason: SURG

## 2019-01-23 RX ORDER — DIPHENHYDRAMINE HCL 25 MG
25 CAPSULE ORAL EVERY 6 HOURS PRN
Status: DISCONTINUED | OUTPATIENT
Start: 2019-01-23 | End: 2019-01-27

## 2019-01-23 RX ORDER — CEFAZOLIN SODIUM 2 G/100ML
2 INJECTION, SOLUTION INTRAVENOUS EVERY 8 HOURS
Status: COMPLETED | OUTPATIENT
Start: 2019-01-24 | End: 2019-01-24

## 2019-01-23 RX ADMIN — HYDRALAZINE HYDROCHLORIDE 5 MG: 20 INJECTION, SOLUTION INTRAMUSCULAR; INTRAVENOUS at 19:14

## 2019-01-23 RX ADMIN — LIDOCAINE HYDROCHLORIDE 50 MG: 10 INJECTION, SOLUTION INFILTRATION; PERINEURAL at 18:02

## 2019-01-23 RX ADMIN — INSULIN LISPRO 6 UNITS: 100 INJECTION, SOLUTION INTRAVENOUS; SUBCUTANEOUS at 22:14

## 2019-01-23 RX ADMIN — NEOSTIGMINE METHYLSULFATE 3 MG: 1 INJECTION, SOLUTION INTRAVENOUS at 19:34

## 2019-01-23 RX ADMIN — GLYCOPYRROLATE 0.4 MG: 0.2 INJECTION, SOLUTION INTRAMUSCULAR; INTRAVENOUS at 19:34

## 2019-01-23 RX ADMIN — PROPOFOL 150 MG: 10 INJECTION, EMULSION INTRAVENOUS at 18:02

## 2019-01-23 RX ADMIN — BUPROPION HYDROCHLORIDE 450 MG: 150 TABLET, FILM COATED, EXTENDED RELEASE ORAL at 08:09

## 2019-01-23 RX ADMIN — ROPIVACAINE HYDROCHLORIDE 8 ML/HR: 2 INJECTION, SOLUTION EPIDURAL; INFILTRATION at 20:22

## 2019-01-23 RX ADMIN — ATRACURIUM BESYLATE 40 MG: 10 INJECTION, SOLUTION INTRAVENOUS at 18:02

## 2019-01-23 RX ADMIN — TRANEXAMIC ACID 1000 MG: 100 INJECTION, SOLUTION INTRAVENOUS at 19:40

## 2019-01-23 RX ADMIN — MIDAZOLAM HYDROCHLORIDE 2 MG: 1 INJECTION, SOLUTION INTRAMUSCULAR; INTRAVENOUS at 17:20

## 2019-01-23 RX ADMIN — CEFAZOLIN 2 G: 330 INJECTION, POWDER, FOR SOLUTION INTRAMUSCULAR; INTRAVENOUS at 18:11

## 2019-01-23 RX ADMIN — SODIUM CHLORIDE 100 ML/HR: 9 INJECTION, SOLUTION INTRAVENOUS at 22:15

## 2019-01-23 RX ADMIN — FAMOTIDINE 20 MG: 20 TABLET ORAL at 16:54

## 2019-01-23 RX ADMIN — BUPIVACAINE HYDROCHLORIDE 50 ML: 2.5 INJECTION, SOLUTION EPIDURAL; INFILTRATION; INTRACAUDAL; PERINEURAL at 17:20

## 2019-01-23 RX ADMIN — SODIUM CHLORIDE, POTASSIUM CHLORIDE, SODIUM LACTATE AND CALCIUM CHLORIDE: 600; 310; 30; 20 INJECTION, SOLUTION INTRAVENOUS at 19:30

## 2019-01-23 RX ADMIN — FENTANYL CITRATE 100 MCG: 50 INJECTION, SOLUTION INTRAMUSCULAR; INTRAVENOUS at 18:02

## 2019-01-23 RX ADMIN — SODIUM CHLORIDE, POTASSIUM CHLORIDE, SODIUM LACTATE AND CALCIUM CHLORIDE: 600; 310; 30; 20 INJECTION, SOLUTION INTRAVENOUS at 17:55

## 2019-01-23 RX ADMIN — SODIUM CHLORIDE 50 ML/HR: 9 INJECTION, SOLUTION INTRAVENOUS at 03:01

## 2019-01-23 RX ADMIN — ONDANSETRON 4 MG: 2 INJECTION INTRAMUSCULAR; INTRAVENOUS at 19:34

## 2019-01-23 RX ADMIN — ATORVASTATIN CALCIUM 40 MG: 40 TABLET, FILM COATED ORAL at 22:14

## 2019-01-23 RX ADMIN — FENTANYL CITRATE 100 MCG: 50 INJECTION, SOLUTION INTRAMUSCULAR; INTRAVENOUS at 17:20

## 2019-01-23 RX ADMIN — FLUOXETINE HYDROCHLORIDE 40 MG: 20 CAPSULE ORAL at 08:09

## 2019-01-23 RX ADMIN — TRANEXAMIC ACID 1000 MG: 100 INJECTION, SOLUTION INTRAVENOUS at 18:20

## 2019-01-23 RX ADMIN — MEPERIDINE HYDROCHLORIDE 50 MG: 50 INJECTION INTRAMUSCULAR; INTRAVENOUS; SUBCUTANEOUS at 23:11

## 2019-01-23 RX ADMIN — LIDOCAINE HYDROCHLORIDE 0.5 ML: 10 INJECTION, SOLUTION EPIDURAL; INFILTRATION; INTRACAUDAL; PERINEURAL at 17:00

## 2019-01-23 NOTE — ANESTHESIA PROCEDURE NOTES
Airway  Urgency: elective      General Information and Staff    Patient location during procedure: OR  Anesthesiologist: Timo Claros MD    Indications and Patient Condition  Indications for airway management: airway protection    Preoxygenated: yes  Mask difficulty assessment: 1 - vent by mask    Final Airway Details  Final airway type: endotracheal airway      Successful airway: ETT  Cuffed: yes   Successful intubation technique: direct laryngoscopy  Endotracheal tube insertion site: oral  Blade: Jewel  Blade size: 3  ETT size (mm): 7.0  Cormack-Lehane Classification: grade IIa - partial view of glottis  Placement verified by: chest auscultation   Measured from: gums  Number of attempts at approach: 1

## 2019-01-23 NOTE — ANESTHESIA PREPROCEDURE EVALUATION
Anesthesia Evaluation     NPO Solid Status: > 8 hours  NPO Liquid Status: > 8 hours           Airway   Mallampati: II  TM distance: >3 FB  Neck ROM: full  No difficulty expected  Dental - normal exam     Pulmonary - normal exam    breath sounds clear to auscultation  (-) asthma, not a smoker  Cardiovascular - normal exam    ECG reviewed  Rhythm: regular  Rate: normal    (+) hypertension,   (-) pacemaker, angina      Neuro/Psych  (-) seizures, TIA, CVA  GI/Hepatic/Renal/Endo    (+) obesity,   renal disease CRI, diabetes mellitus (IDDM),   (-) liver disease    Musculoskeletal     Abdominal    Substance History      OB/GYN          Other        ROS/Med Hx Other: Gastric sleeve                Anesthesia Plan    ASA 3 - emergent     general with block   (Left hip FICB/catheter for post-operative analgesia per request of Dr. Thurston)  intravenous induction   Anesthetic plan, all risks, benefits, and alternatives have been provided, discussed and informed consent has been obtained with: patient.

## 2019-01-23 NOTE — ANESTHESIA PROCEDURE NOTES
Peripheral Block      Patient reassessed immediately prior to procedure    Patient location during procedure: pre-op  Reason for block: procedure for pain and at surgeon's request  Performed by  Anesthesiologist: Timo Claros MD  Assisted by: Ray Garcia MD  Preanesthetic Checklist  Completed: patient identified, site marked, surgical consent, pre-op evaluation, timeout performed, IV checked, risks and benefits discussed and monitors and equipment checked  Prep:  Pt Position: supine  Sterile barriers:cap, gloves and mask  Prep: ChloraPrep  Patient monitoring: blood pressure monitoring, continuous pulse oximetry and EKG  Procedure  Sedation:yes  Performed under: local infiltration  Guidance:ultrasound guided  ULTRASOUND INTERPRETATION. Using ultrasound guidance a 20 G gauge needle was placed in close proximity to the nerve, at which point, under ultrasound guidance anesthetic was injected in the area of the nerve and spread of the anesthesia was seen on ultrasound in close proximity thereto.  There were no abnormalities seen on ultrasound; a digital image was taken; and the patient tolerated the procedure with no complications. Images:still images not obtained    Laterality:left  Block Type:fascia iliaca catheter  Injection Technique:catheter  Needle Type:echogenic  Needle Gauge:18 G  Resistance on Injection: none  Catheter Size:20 G (20g)  Cath Depth at skin: 12 cm  Medications Used: fentaNYL citrate (PF) (SUBLIMAZE) injection, 100 mcg  midazolam (VERSED) injection, 2 mg  bupivacaine PF (MARCAINE) 0.25 % injection, 50 mL  Med admintered at 1/23/2019 5:20 PM  Medications  Preservative Free Saline:10ml    Post Assessment  Injection Assessment: negative aspiration for heme, no paresthesia on injection and incremental injection  Patient Tolerance:comfortable throughout block  Complications:no  Additional Notes  Procedure:                 Pt placed in supine position.   The insertion site was prepped in sterile  fashion with Chlorapreop and clear plastic drapes.  Analgesia was provided by skin infiltration at insertion site with Lidocaine 1% 3mls.  A B-Funes 18 g , 4 inch echogenic Touhy needle was advance In-plane under ultrasound guidance. The   Anterior superior Iliac crest was initially visualized and the probe was directed slightly medially and slightly towards the umbilicus.  The course of the needle was tracked over the sartorius muscle through the fascia Iliacus and into the anterior portion of the Iliacus muscle.  Major vessels where identified and avoided as where structures of the peritoneal cavity.  LA injection was made incrementally in 1-5ml amounts spread was visualized superiorly below fascia iliacus.  Injection was completed with negative aspiration of blood and negative intravascular injection.  Injection pressures where normal or minimal resistance.  A 20 g B-Funes wire styleted catheter was then advance thru the needle and very easily placed in a superior or cephalad direction.  The catheter was secured at insertion site with skin afix , mastisol, steristreps.  A CHG tegaderm dressing was placed over the insertion site and the nerve catheter labeled and capped.  Thank You.

## 2019-01-24 LAB
ANION GAP SERPL CALCULATED.3IONS-SCNC: 7 MMOL/L (ref 3–11)
BASOPHILS # BLD AUTO: 0.01 10*3/MM3 (ref 0–0.2)
BASOPHILS NFR BLD AUTO: 0.1 % (ref 0–1)
BUN BLD-MCNC: 19 MG/DL (ref 9–23)
BUN/CREAT SERPL: 15 (ref 7–25)
CALCIUM SPEC-SCNC: 8.1 MG/DL (ref 8.7–10.4)
CHLORIDE SERPL-SCNC: 104 MMOL/L (ref 99–109)
CO2 SERPL-SCNC: 24 MMOL/L (ref 20–31)
CREAT BLD-MCNC: 1.27 MG/DL (ref 0.6–1.3)
DEPRECATED RDW RBC AUTO: 37.9 FL (ref 37–54)
EOSINOPHIL # BLD AUTO: 0.01 10*3/MM3 (ref 0–0.3)
EOSINOPHIL NFR BLD AUTO: 0.1 % (ref 0–3)
ERYTHROCYTE [DISTWIDTH] IN BLOOD BY AUTOMATED COUNT: 12.3 % (ref 11.3–14.5)
GFR SERPL CREATININE-BSD FRML MDRD: 42 ML/MIN/1.73
GLUCOSE BLD-MCNC: 226 MG/DL (ref 70–100)
GLUCOSE BLDC GLUCOMTR-MCNC: 225 MG/DL (ref 70–130)
GLUCOSE BLDC GLUCOMTR-MCNC: 227 MG/DL (ref 70–130)
GLUCOSE BLDC GLUCOMTR-MCNC: 255 MG/DL (ref 70–130)
GLUCOSE BLDC GLUCOMTR-MCNC: 272 MG/DL (ref 70–130)
HCT VFR BLD AUTO: 30.9 % (ref 34.5–44)
HGB BLD-MCNC: 10 G/DL (ref 11.5–15.5)
IMM GRANULOCYTES # BLD AUTO: 0.02 10*3/MM3 (ref 0–0.03)
IMM GRANULOCYTES NFR BLD AUTO: 0.2 % (ref 0–0.6)
LYMPHOCYTES # BLD AUTO: 1.44 10*3/MM3 (ref 0.6–4.8)
LYMPHOCYTES NFR BLD AUTO: 13.4 % (ref 24–44)
MCH RBC QN AUTO: 27.2 PG (ref 27–31)
MCHC RBC AUTO-ENTMCNC: 32.4 G/DL (ref 32–36)
MCV RBC AUTO: 84.2 FL (ref 80–99)
MONOCYTES # BLD AUTO: 1.61 10*3/MM3 (ref 0–1)
MONOCYTES NFR BLD AUTO: 15 % (ref 0–12)
NEUTROPHILS # BLD AUTO: 7.63 10*3/MM3 (ref 1.5–8.3)
NEUTROPHILS NFR BLD AUTO: 71.2 % (ref 41–71)
PLATELET # BLD AUTO: 142 10*3/MM3 (ref 150–450)
PMV BLD AUTO: 9.3 FL (ref 6–12)
POTASSIUM BLD-SCNC: 4.1 MMOL/L (ref 3.5–5.5)
RBC # BLD AUTO: 3.67 10*6/MM3 (ref 3.89–5.14)
SODIUM BLD-SCNC: 135 MMOL/L (ref 132–146)
WBC NRBC COR # BLD: 10.72 10*3/MM3 (ref 3.5–10.8)

## 2019-01-24 PROCEDURE — 25010000003 CEFAZOLIN IN DEXTROSE 2-4 GM/100ML-% SOLUTION: Performed by: ORTHOPAEDIC SURGERY

## 2019-01-24 PROCEDURE — 97166 OT EVAL MOD COMPLEX 45 MIN: CPT | Performed by: OCCUPATIONAL THERAPIST

## 2019-01-24 PROCEDURE — 25010000002 ROPIVACAINE PER 1 MG: Performed by: ANESTHESIOLOGY

## 2019-01-24 PROCEDURE — 97162 PT EVAL MOD COMPLEX 30 MIN: CPT

## 2019-01-24 PROCEDURE — 97530 THERAPEUTIC ACTIVITIES: CPT | Performed by: OCCUPATIONAL THERAPIST

## 2019-01-24 PROCEDURE — 25010000002 ENOXAPARIN PER 10 MG: Performed by: ORTHOPAEDIC SURGERY

## 2019-01-24 PROCEDURE — 97110 THERAPEUTIC EXERCISES: CPT

## 2019-01-24 PROCEDURE — 80048 BASIC METABOLIC PNL TOTAL CA: CPT | Performed by: INTERNAL MEDICINE

## 2019-01-24 PROCEDURE — 99233 SBSQ HOSP IP/OBS HIGH 50: CPT | Performed by: INTERNAL MEDICINE

## 2019-01-24 PROCEDURE — 85025 COMPLETE CBC W/AUTO DIFF WBC: CPT | Performed by: ORTHOPAEDIC SURGERY

## 2019-01-24 PROCEDURE — 82962 GLUCOSE BLOOD TEST: CPT

## 2019-01-24 RX ADMIN — HYDROCODONE BITARTRATE AND ACETAMINOPHEN 2 TABLET: 7.5; 325 TABLET ORAL at 11:58

## 2019-01-24 RX ADMIN — VITAMIN D, TAB 1000IU (100/BT) 1000 UNITS: 25 TAB at 08:16

## 2019-01-24 RX ADMIN — FLUOXETINE HYDROCHLORIDE 40 MG: 20 CAPSULE ORAL at 08:16

## 2019-01-24 RX ADMIN — INSULIN LISPRO 4 UNITS: 100 INJECTION, SOLUTION INTRAVENOUS; SUBCUTANEOUS at 08:16

## 2019-01-24 RX ADMIN — CEFAZOLIN SODIUM 2 G: 2 INJECTION, SOLUTION INTRAVENOUS at 09:42

## 2019-01-24 RX ADMIN — INSULIN LISPRO 4 UNITS: 100 INJECTION, SOLUTION INTRAVENOUS; SUBCUTANEOUS at 21:07

## 2019-01-24 RX ADMIN — ENOXAPARIN SODIUM 40 MG: 40 INJECTION SUBCUTANEOUS at 17:11

## 2019-01-24 RX ADMIN — CEFAZOLIN SODIUM 2 G: 2 INJECTION, SOLUTION INTRAVENOUS at 02:00

## 2019-01-24 RX ADMIN — DOCUSATE SODIUM 100 MG: 100 CAPSULE, LIQUID FILLED ORAL at 21:07

## 2019-01-24 RX ADMIN — BUPROPION HYDROCHLORIDE 450 MG: 150 TABLET, FILM COATED, EXTENDED RELEASE ORAL at 08:16

## 2019-01-24 RX ADMIN — INSULIN LISPRO 6 UNITS: 100 INJECTION, SOLUTION INTRAVENOUS; SUBCUTANEOUS at 17:11

## 2019-01-24 RX ADMIN — ROPIVACAINE HYDROCHLORIDE 8 ML/HR: 2 INJECTION, SOLUTION EPIDURAL; INFILTRATION at 19:31

## 2019-01-24 RX ADMIN — DOCUSATE SODIUM 100 MG: 100 CAPSULE, LIQUID FILLED ORAL at 08:17

## 2019-01-24 RX ADMIN — HYDROCODONE BITARTRATE AND ACETAMINOPHEN 2 TABLET: 7.5; 325 TABLET ORAL at 21:07

## 2019-01-24 RX ADMIN — ATORVASTATIN CALCIUM 40 MG: 40 TABLET, FILM COATED ORAL at 21:07

## 2019-01-24 RX ADMIN — MULTIPLE VITAMINS W/ MINERALS TAB 1 TABLET: TAB ORAL at 08:17

## 2019-01-24 RX ADMIN — INSULIN LISPRO 6 UNITS: 100 INJECTION, SOLUTION INTRAVENOUS; SUBCUTANEOUS at 12:00

## 2019-01-24 NOTE — ANESTHESIA POSTPROCEDURE EVALUATION
Patient: Catherine Carroll    Procedure Summary     Date:  01/23/19 Room / Location:   [facility]    Anesthesia Start:  1759 Anesthesia Stop:      Procedure:  LEFT HIP TROCANTERIC NAILING (Left Hip) Diagnosis:      Surgeon:  Luther Thurston MD Provider:  Ray Garcia MD    Anesthesia Type:  general with block ASA Status:  3 - Emergent          Anesthesia Type: No value filed.  Last vitals  BP   152/61 (01/23/19 1702)   Temp   98.8 °F (37.1 °C) (01/23/19 1702)   Pulse   75 (01/23/19 1702)   Resp   18 (01/23/19 1702)     SpO2   97 % (01/23/19 1702)     Post Anesthesia Care and Evaluation    Patient location during evaluation: PACU  Patient participation: complete - patient participated  Level of consciousness: awake and alert  Pain score: 0  Pain management: adequate  Airway patency: patent  Anesthetic complications: No anesthetic complications  PONV Status: none  Cardiovascular status: hemodynamically stable and acceptable  Respiratory status: nonlabored ventilation, acceptable and nasal cannula  Hydration status: acceptable

## 2019-01-25 LAB
ANION GAP SERPL CALCULATED.3IONS-SCNC: 7 MMOL/L (ref 3–11)
BUN BLD-MCNC: 20 MG/DL (ref 9–23)
BUN/CREAT SERPL: 15.7 (ref 7–25)
CALCIUM SPEC-SCNC: 8.2 MG/DL (ref 8.7–10.4)
CHLORIDE SERPL-SCNC: 104 MMOL/L (ref 99–109)
CO2 SERPL-SCNC: 25 MMOL/L (ref 20–31)
CREAT BLD-MCNC: 1.27 MG/DL (ref 0.6–1.3)
DEPRECATED RDW RBC AUTO: 38.9 FL (ref 37–54)
ERYTHROCYTE [DISTWIDTH] IN BLOOD BY AUTOMATED COUNT: 12.6 % (ref 11.3–14.5)
GFR SERPL CREATININE-BSD FRML MDRD: 42 ML/MIN/1.73
GLUCOSE BLD-MCNC: 163 MG/DL (ref 70–100)
GLUCOSE BLDC GLUCOMTR-MCNC: 181 MG/DL (ref 70–130)
GLUCOSE BLDC GLUCOMTR-MCNC: 231 MG/DL (ref 70–130)
GLUCOSE BLDC GLUCOMTR-MCNC: 235 MG/DL (ref 70–130)
GLUCOSE BLDC GLUCOMTR-MCNC: 256 MG/DL (ref 70–130)
HCT VFR BLD AUTO: 28.8 % (ref 34.5–44)
HGB BLD-MCNC: 9.2 G/DL (ref 11.5–15.5)
MCH RBC QN AUTO: 27.3 PG (ref 27–31)
MCHC RBC AUTO-ENTMCNC: 31.9 G/DL (ref 32–36)
MCV RBC AUTO: 85.5 FL (ref 80–99)
PLATELET # BLD AUTO: 135 10*3/MM3 (ref 150–450)
PMV BLD AUTO: 9.7 FL (ref 6–12)
POTASSIUM BLD-SCNC: 4.2 MMOL/L (ref 3.5–5.5)
RBC # BLD AUTO: 3.37 10*6/MM3 (ref 3.89–5.14)
SODIUM BLD-SCNC: 136 MMOL/L (ref 132–146)
WBC NRBC COR # BLD: 7.28 10*3/MM3 (ref 3.5–10.8)

## 2019-01-25 PROCEDURE — 99232 SBSQ HOSP IP/OBS MODERATE 35: CPT | Performed by: INTERNAL MEDICINE

## 2019-01-25 PROCEDURE — 82962 GLUCOSE BLOOD TEST: CPT

## 2019-01-25 PROCEDURE — 97116 GAIT TRAINING THERAPY: CPT

## 2019-01-25 PROCEDURE — 25010000002 ENOXAPARIN PER 10 MG: Performed by: ORTHOPAEDIC SURGERY

## 2019-01-25 PROCEDURE — 97110 THERAPEUTIC EXERCISES: CPT

## 2019-01-25 PROCEDURE — 80048 BASIC METABOLIC PNL TOTAL CA: CPT | Performed by: INTERNAL MEDICINE

## 2019-01-25 PROCEDURE — 25010000002 ROPIVACAINE PER 1 MG: Performed by: ANESTHESIOLOGY

## 2019-01-25 PROCEDURE — 85027 COMPLETE CBC AUTOMATED: CPT | Performed by: INTERNAL MEDICINE

## 2019-01-25 RX ADMIN — DOCUSATE SODIUM 100 MG: 100 CAPSULE, LIQUID FILLED ORAL at 22:58

## 2019-01-25 RX ADMIN — BUPROPION HYDROCHLORIDE 450 MG: 150 TABLET, FILM COATED, EXTENDED RELEASE ORAL at 08:49

## 2019-01-25 RX ADMIN — DOCUSATE SODIUM 100 MG: 100 CAPSULE, LIQUID FILLED ORAL at 08:49

## 2019-01-25 RX ADMIN — INSULIN LISPRO 2 UNITS: 100 INJECTION, SOLUTION INTRAVENOUS; SUBCUTANEOUS at 08:49

## 2019-01-25 RX ADMIN — INSULIN LISPRO 4 UNITS: 100 INJECTION, SOLUTION INTRAVENOUS; SUBCUTANEOUS at 17:06

## 2019-01-25 RX ADMIN — HYDROCODONE BITARTRATE AND ACETAMINOPHEN 2 TABLET: 7.5; 325 TABLET ORAL at 12:25

## 2019-01-25 RX ADMIN — SODIUM CHLORIDE 100 ML/HR: 9 INJECTION, SOLUTION INTRAVENOUS at 19:07

## 2019-01-25 RX ADMIN — ATORVASTATIN CALCIUM 40 MG: 40 TABLET, FILM COATED ORAL at 22:57

## 2019-01-25 RX ADMIN — FLUOXETINE HYDROCHLORIDE 40 MG: 20 CAPSULE ORAL at 08:50

## 2019-01-25 RX ADMIN — BISACODYL 10 MG: 5 TABLET, COATED ORAL at 22:58

## 2019-01-25 RX ADMIN — POLYETHYLENE GLYCOL 3350 17 G: 17 POWDER, FOR SOLUTION ORAL at 17:03

## 2019-01-25 RX ADMIN — VITAMIN D, TAB 1000IU (100/BT) 1000 UNITS: 25 TAB at 08:49

## 2019-01-25 RX ADMIN — INSULIN LISPRO 6 UNITS: 100 INJECTION, SOLUTION INTRAVENOUS; SUBCUTANEOUS at 23:01

## 2019-01-25 RX ADMIN — INSULIN LISPRO 4 UNITS: 100 INJECTION, SOLUTION INTRAVENOUS; SUBCUTANEOUS at 12:25

## 2019-01-25 RX ADMIN — HYDROXYZINE HYDROCHLORIDE 100 MG: 25 TABLET, FILM COATED ORAL at 23:31

## 2019-01-25 RX ADMIN — ENOXAPARIN SODIUM 40 MG: 40 INJECTION SUBCUTANEOUS at 17:07

## 2019-01-25 RX ADMIN — MULTIPLE VITAMINS W/ MINERALS TAB 1 TABLET: TAB ORAL at 08:49

## 2019-01-25 RX ADMIN — ROPIVACAINE HYDROCHLORIDE 8 ML/HR: 2 INJECTION, SOLUTION EPIDURAL; INFILTRATION at 18:25

## 2019-01-26 LAB
ANION GAP SERPL CALCULATED.3IONS-SCNC: 4 MMOL/L (ref 3–11)
BUN BLD-MCNC: 16 MG/DL (ref 9–23)
BUN/CREAT SERPL: 16.2 (ref 7–25)
CALCIUM SPEC-SCNC: 8.3 MG/DL (ref 8.7–10.4)
CHLORIDE SERPL-SCNC: 106 MMOL/L (ref 99–109)
CO2 SERPL-SCNC: 27 MMOL/L (ref 20–31)
CREAT BLD-MCNC: 0.99 MG/DL (ref 0.6–1.3)
DEPRECATED RDW RBC AUTO: 38.7 FL (ref 37–54)
ERYTHROCYTE [DISTWIDTH] IN BLOOD BY AUTOMATED COUNT: 12.5 % (ref 11.3–14.5)
GFR SERPL CREATININE-BSD FRML MDRD: 56 ML/MIN/1.73
GLUCOSE BLD-MCNC: 133 MG/DL (ref 70–100)
GLUCOSE BLDC GLUCOMTR-MCNC: 195 MG/DL (ref 70–130)
GLUCOSE BLDC GLUCOMTR-MCNC: 197 MG/DL (ref 70–130)
GLUCOSE BLDC GLUCOMTR-MCNC: 206 MG/DL (ref 70–130)
GLUCOSE BLDC GLUCOMTR-MCNC: 224 MG/DL (ref 70–130)
HCT VFR BLD AUTO: 25.5 % (ref 34.5–44)
HGB BLD-MCNC: 8.2 G/DL (ref 11.5–15.5)
MCH RBC QN AUTO: 27.4 PG (ref 27–31)
MCHC RBC AUTO-ENTMCNC: 32.2 G/DL (ref 32–36)
MCV RBC AUTO: 85.3 FL (ref 80–99)
PLATELET # BLD AUTO: 150 10*3/MM3 (ref 150–450)
PMV BLD AUTO: 9.7 FL (ref 6–12)
POTASSIUM BLD-SCNC: 3.7 MMOL/L (ref 3.5–5.5)
RBC # BLD AUTO: 2.99 10*6/MM3 (ref 3.89–5.14)
SODIUM BLD-SCNC: 137 MMOL/L (ref 132–146)
WBC NRBC COR # BLD: 5.32 10*3/MM3 (ref 3.5–10.8)

## 2019-01-26 PROCEDURE — 97530 THERAPEUTIC ACTIVITIES: CPT

## 2019-01-26 PROCEDURE — 99232 SBSQ HOSP IP/OBS MODERATE 35: CPT | Performed by: NURSE PRACTITIONER

## 2019-01-26 PROCEDURE — 85027 COMPLETE CBC AUTOMATED: CPT | Performed by: INTERNAL MEDICINE

## 2019-01-26 PROCEDURE — 25010000002 ROPIVACAINE PER 1 MG: Performed by: ANESTHESIOLOGY

## 2019-01-26 PROCEDURE — 82962 GLUCOSE BLOOD TEST: CPT

## 2019-01-26 PROCEDURE — 97110 THERAPEUTIC EXERCISES: CPT

## 2019-01-26 PROCEDURE — 80048 BASIC METABOLIC PNL TOTAL CA: CPT | Performed by: INTERNAL MEDICINE

## 2019-01-26 PROCEDURE — 25010000002 ENOXAPARIN PER 10 MG: Performed by: ORTHOPAEDIC SURGERY

## 2019-01-26 RX ORDER — TRAZODONE HYDROCHLORIDE 50 MG/1
25 TABLET ORAL ONCE
Status: COMPLETED | OUTPATIENT
Start: 2019-01-26 | End: 2019-01-26

## 2019-01-26 RX ADMIN — MULTIPLE VITAMINS W/ MINERALS TAB 1 TABLET: TAB ORAL at 08:33

## 2019-01-26 RX ADMIN — BUPROPION HYDROCHLORIDE 450 MG: 150 TABLET, FILM COATED, EXTENDED RELEASE ORAL at 08:31

## 2019-01-26 RX ADMIN — VITAMIN D, TAB 1000IU (100/BT) 1000 UNITS: 25 TAB at 08:33

## 2019-01-26 RX ADMIN — INSULIN LISPRO 4 UNITS: 100 INJECTION, SOLUTION INTRAVENOUS; SUBCUTANEOUS at 16:41

## 2019-01-26 RX ADMIN — FLUOXETINE HYDROCHLORIDE 40 MG: 20 CAPSULE ORAL at 08:31

## 2019-01-26 RX ADMIN — TRAZODONE HYDROCHLORIDE 25 MG: 50 TABLET ORAL at 20:44

## 2019-01-26 RX ADMIN — INSULIN LISPRO 4 UNITS: 100 INJECTION, SOLUTION INTRAVENOUS; SUBCUTANEOUS at 20:44

## 2019-01-26 RX ADMIN — ROPIVACAINE HYDROCHLORIDE 8 ML/HR: 2 INJECTION, SOLUTION EPIDURAL; INFILTRATION at 17:08

## 2019-01-26 RX ADMIN — DOCUSATE SODIUM 100 MG: 100 CAPSULE, LIQUID FILLED ORAL at 08:33

## 2019-01-26 RX ADMIN — DOCUSATE SODIUM 100 MG: 100 CAPSULE, LIQUID FILLED ORAL at 20:07

## 2019-01-26 RX ADMIN — STANDARDIZED SENNA CONCENTRATE AND DOCUSATE SODIUM 2 TABLET: 8.6; 5 TABLET, FILM COATED ORAL at 20:07

## 2019-01-26 RX ADMIN — INSULIN LISPRO 2 UNITS: 100 INJECTION, SOLUTION INTRAVENOUS; SUBCUTANEOUS at 08:34

## 2019-01-26 RX ADMIN — POLYETHYLENE GLYCOL 3350 17 G: 17 POWDER, FOR SOLUTION ORAL at 08:34

## 2019-01-26 RX ADMIN — HYDROXYZINE HYDROCHLORIDE 100 MG: 25 TABLET, FILM COATED ORAL at 22:45

## 2019-01-26 RX ADMIN — ENOXAPARIN SODIUM 40 MG: 40 INJECTION SUBCUTANEOUS at 17:08

## 2019-01-26 RX ADMIN — INSULIN LISPRO 2 UNITS: 100 INJECTION, SOLUTION INTRAVENOUS; SUBCUTANEOUS at 11:53

## 2019-01-26 RX ADMIN — ATORVASTATIN CALCIUM 40 MG: 40 TABLET, FILM COATED ORAL at 20:07

## 2019-01-27 LAB
GLUCOSE BLDC GLUCOMTR-MCNC: 158 MG/DL (ref 70–130)
GLUCOSE BLDC GLUCOMTR-MCNC: 200 MG/DL (ref 70–130)
GLUCOSE BLDC GLUCOMTR-MCNC: 222 MG/DL (ref 70–130)
GLUCOSE BLDC GLUCOMTR-MCNC: 240 MG/DL (ref 70–130)

## 2019-01-27 PROCEDURE — 25010000002 ENOXAPARIN PER 10 MG: Performed by: ORTHOPAEDIC SURGERY

## 2019-01-27 PROCEDURE — 97116 GAIT TRAINING THERAPY: CPT

## 2019-01-27 PROCEDURE — 25010000002 ROPIVACAINE PER 1 MG: Performed by: ANESTHESIOLOGY

## 2019-01-27 PROCEDURE — 63710000001 INSULIN DETEMIR PER 5 UNITS: Performed by: PHYSICIAN ASSISTANT

## 2019-01-27 PROCEDURE — 99232 SBSQ HOSP IP/OBS MODERATE 35: CPT | Performed by: PHYSICIAN ASSISTANT

## 2019-01-27 PROCEDURE — 82962 GLUCOSE BLOOD TEST: CPT

## 2019-01-27 PROCEDURE — 97110 THERAPEUTIC EXERCISES: CPT

## 2019-01-27 PROCEDURE — 97530 THERAPEUTIC ACTIVITIES: CPT

## 2019-01-27 RX ORDER — ALPRAZOLAM 1 MG/1
1 TABLET ORAL ONCE
Status: COMPLETED | OUTPATIENT
Start: 2019-01-27 | End: 2019-01-27

## 2019-01-27 RX ORDER — PRAZOSIN HYDROCHLORIDE 1 MG/1
1 CAPSULE ORAL NIGHTLY
COMMUNITY
End: 2019-01-30 | Stop reason: HOSPADM

## 2019-01-27 RX ORDER — TRAZODONE HYDROCHLORIDE 50 MG/1
150 TABLET ORAL NIGHTLY
Status: DISCONTINUED | OUTPATIENT
Start: 2019-01-27 | End: 2019-01-30 | Stop reason: HOSPADM

## 2019-01-27 RX ORDER — HYDROXYZINE HYDROCHLORIDE 25 MG/1
100 TABLET, FILM COATED ORAL NIGHTLY
Status: DISCONTINUED | OUTPATIENT
Start: 2019-01-27 | End: 2019-01-30 | Stop reason: HOSPADM

## 2019-01-27 RX ADMIN — INSULIN LISPRO 4 UNITS: 100 INJECTION, SOLUTION INTRAVENOUS; SUBCUTANEOUS at 08:01

## 2019-01-27 RX ADMIN — INSULIN LISPRO 4 UNITS: 100 INJECTION, SOLUTION INTRAVENOUS; SUBCUTANEOUS at 18:01

## 2019-01-27 RX ADMIN — INSULIN LISPRO 2 UNITS: 100 INJECTION, SOLUTION INTRAVENOUS; SUBCUTANEOUS at 11:39

## 2019-01-27 RX ADMIN — HYDROXYZINE HYDROCHLORIDE 100 MG: 25 TABLET, FILM COATED ORAL at 20:28

## 2019-01-27 RX ADMIN — INSULIN LISPRO 2 UNITS: 100 INJECTION, SOLUTION INTRAVENOUS; SUBCUTANEOUS at 18:00

## 2019-01-27 RX ADMIN — ENOXAPARIN SODIUM 40 MG: 40 INJECTION SUBCUTANEOUS at 18:00

## 2019-01-27 RX ADMIN — DOCUSATE SODIUM 100 MG: 100 CAPSULE, LIQUID FILLED ORAL at 20:29

## 2019-01-27 RX ADMIN — INSULIN LISPRO 4 UNITS: 100 INJECTION, SOLUTION INTRAVENOUS; SUBCUTANEOUS at 11:38

## 2019-01-27 RX ADMIN — INSULIN LISPRO 2 UNITS: 100 INJECTION, SOLUTION INTRAVENOUS; SUBCUTANEOUS at 20:53

## 2019-01-27 RX ADMIN — DOCUSATE SODIUM 100 MG: 100 CAPSULE, LIQUID FILLED ORAL at 08:01

## 2019-01-27 RX ADMIN — INSULIN DETEMIR 5 UNITS: 100 INJECTION, SOLUTION SUBCUTANEOUS at 20:53

## 2019-01-27 RX ADMIN — ALPRAZOLAM 1 MG: 1 TABLET ORAL at 14:51

## 2019-01-27 RX ADMIN — TRAZODONE HYDROCHLORIDE 150 MG: 50 TABLET ORAL at 20:28

## 2019-01-27 RX ADMIN — VITAMIN D, TAB 1000IU (100/BT) 1000 UNITS: 25 TAB at 08:01

## 2019-01-27 RX ADMIN — BUPROPION HYDROCHLORIDE 450 MG: 150 TABLET, FILM COATED, EXTENDED RELEASE ORAL at 08:00

## 2019-01-27 RX ADMIN — FLUOXETINE HYDROCHLORIDE 40 MG: 20 CAPSULE ORAL at 08:01

## 2019-01-27 RX ADMIN — ROPIVACAINE HYDROCHLORIDE 8 ML/HR: 2 INJECTION, SOLUTION EPIDURAL; INFILTRATION at 16:11

## 2019-01-27 RX ADMIN — ATORVASTATIN CALCIUM 40 MG: 40 TABLET, FILM COATED ORAL at 20:29

## 2019-01-27 RX ADMIN — MULTIPLE VITAMINS W/ MINERALS TAB 1 TABLET: TAB ORAL at 08:01

## 2019-01-28 LAB
ANION GAP SERPL CALCULATED.3IONS-SCNC: 4 MMOL/L (ref 3–11)
BUN BLD-MCNC: 14 MG/DL (ref 9–23)
BUN/CREAT SERPL: 16.5 (ref 7–25)
CALCIUM SPEC-SCNC: 8.1 MG/DL (ref 8.7–10.4)
CHLORIDE SERPL-SCNC: 105 MMOL/L (ref 99–109)
CO2 SERPL-SCNC: 28 MMOL/L (ref 20–31)
CREAT BLD-MCNC: 0.85 MG/DL (ref 0.6–1.3)
DEPRECATED RDW RBC AUTO: 39.8 FL (ref 37–54)
ERYTHROCYTE [DISTWIDTH] IN BLOOD BY AUTOMATED COUNT: 12.6 % (ref 11.3–14.5)
GFR SERPL CREATININE-BSD FRML MDRD: 67 ML/MIN/1.73
GLUCOSE BLD-MCNC: 125 MG/DL (ref 70–100)
GLUCOSE BLDC GLUCOMTR-MCNC: 142 MG/DL (ref 70–130)
GLUCOSE BLDC GLUCOMTR-MCNC: 148 MG/DL (ref 70–130)
GLUCOSE BLDC GLUCOMTR-MCNC: 153 MG/DL (ref 70–130)
GLUCOSE BLDC GLUCOMTR-MCNC: 221 MG/DL (ref 70–130)
HCT VFR BLD AUTO: 27.6 % (ref 34.5–44)
HGB BLD-MCNC: 8.7 G/DL (ref 11.5–15.5)
MCH RBC QN AUTO: 27.2 PG (ref 27–31)
MCHC RBC AUTO-ENTMCNC: 31.5 G/DL (ref 32–36)
MCV RBC AUTO: 86.3 FL (ref 80–99)
PLATELET # BLD AUTO: 218 10*3/MM3 (ref 150–450)
PMV BLD AUTO: 9.2 FL (ref 6–12)
POTASSIUM BLD-SCNC: 3.1 MMOL/L (ref 3.5–5.5)
RBC # BLD AUTO: 3.2 10*6/MM3 (ref 3.89–5.14)
SODIUM BLD-SCNC: 137 MMOL/L (ref 132–146)
WBC NRBC COR # BLD: 4.97 10*3/MM3 (ref 3.5–10.8)

## 2019-01-28 PROCEDURE — 80048 BASIC METABOLIC PNL TOTAL CA: CPT | Performed by: PHYSICIAN ASSISTANT

## 2019-01-28 PROCEDURE — 25010000002 ENOXAPARIN PER 10 MG: Performed by: ORTHOPAEDIC SURGERY

## 2019-01-28 PROCEDURE — 99232 SBSQ HOSP IP/OBS MODERATE 35: CPT | Performed by: PHYSICIAN ASSISTANT

## 2019-01-28 PROCEDURE — 63710000001 INSULIN LISPRO (HUMAN) PER 5 UNITS: Performed by: PHYSICIAN ASSISTANT

## 2019-01-28 PROCEDURE — 63710000001 INSULIN DETEMIR PER 5 UNITS: Performed by: PHYSICIAN ASSISTANT

## 2019-01-28 PROCEDURE — 85027 COMPLETE CBC AUTOMATED: CPT | Performed by: PHYSICIAN ASSISTANT

## 2019-01-28 PROCEDURE — 82962 GLUCOSE BLOOD TEST: CPT

## 2019-01-28 PROCEDURE — 97110 THERAPEUTIC EXERCISES: CPT

## 2019-01-28 PROCEDURE — 97116 GAIT TRAINING THERAPY: CPT

## 2019-01-28 RX ORDER — POTASSIUM CHLORIDE 750 MG/1
40 CAPSULE, EXTENDED RELEASE ORAL AS NEEDED
Status: DISCONTINUED | OUTPATIENT
Start: 2019-01-28 | End: 2019-01-30 | Stop reason: HOSPADM

## 2019-01-28 RX ORDER — POTASSIUM CHLORIDE 1.5 G/1.77G
40 POWDER, FOR SOLUTION ORAL AS NEEDED
Status: DISCONTINUED | OUTPATIENT
Start: 2019-01-28 | End: 2019-01-30 | Stop reason: HOSPADM

## 2019-01-28 RX ORDER — POTASSIUM CHLORIDE 7.45 MG/ML
10 INJECTION INTRAVENOUS
Status: DISCONTINUED | OUTPATIENT
Start: 2019-01-28 | End: 2019-01-30 | Stop reason: HOSPADM

## 2019-01-28 RX ORDER — LISINOPRIL 5 MG/1
2.5 TABLET ORAL
Status: DISCONTINUED | OUTPATIENT
Start: 2019-01-28 | End: 2019-01-30

## 2019-01-28 RX ADMIN — POTASSIUM CHLORIDE 40 MEQ: 750 CAPSULE, EXTENDED RELEASE ORAL at 11:28

## 2019-01-28 RX ADMIN — VITAMIN D, TAB 1000IU (100/BT) 1000 UNITS: 25 TAB at 08:13

## 2019-01-28 RX ADMIN — MULTIPLE VITAMINS W/ MINERALS TAB 1 TABLET: TAB ORAL at 08:13

## 2019-01-28 RX ADMIN — INSULIN LISPRO 2 UNITS: 100 INJECTION, SOLUTION INTRAVENOUS; SUBCUTANEOUS at 11:28

## 2019-01-28 RX ADMIN — LISINOPRIL 2.5 MG: 5 TABLET ORAL at 15:33

## 2019-01-28 RX ADMIN — INSULIN LISPRO 2 UNITS: 100 INJECTION, SOLUTION INTRAVENOUS; SUBCUTANEOUS at 11:29

## 2019-01-28 RX ADMIN — INSULIN DETEMIR 5 UNITS: 100 INJECTION, SOLUTION SUBCUTANEOUS at 20:26

## 2019-01-28 RX ADMIN — DOCUSATE SODIUM 100 MG: 100 CAPSULE, LIQUID FILLED ORAL at 20:27

## 2019-01-28 RX ADMIN — INSULIN LISPRO 2 UNITS: 100 INJECTION, SOLUTION INTRAVENOUS; SUBCUTANEOUS at 08:13

## 2019-01-28 RX ADMIN — INSULIN LISPRO 4 UNITS: 100 INJECTION, SOLUTION INTRAVENOUS; SUBCUTANEOUS at 17:39

## 2019-01-28 RX ADMIN — HYDROXYZINE HYDROCHLORIDE 100 MG: 25 TABLET, FILM COATED ORAL at 20:27

## 2019-01-28 RX ADMIN — ATORVASTATIN CALCIUM 40 MG: 40 TABLET, FILM COATED ORAL at 20:26

## 2019-01-28 RX ADMIN — POTASSIUM CHLORIDE 40 MEQ: 750 CAPSULE, EXTENDED RELEASE ORAL at 20:41

## 2019-01-28 RX ADMIN — POTASSIUM CHLORIDE 40 MEQ: 750 CAPSULE, EXTENDED RELEASE ORAL at 15:33

## 2019-01-28 RX ADMIN — BUPROPION HYDROCHLORIDE 450 MG: 150 TABLET, FILM COATED, EXTENDED RELEASE ORAL at 08:12

## 2019-01-28 RX ADMIN — TRAZODONE HYDROCHLORIDE 150 MG: 50 TABLET ORAL at 20:26

## 2019-01-28 RX ADMIN — ACETAMINOPHEN 325 MG: 325 TABLET ORAL at 22:06

## 2019-01-28 RX ADMIN — FLUOXETINE HYDROCHLORIDE 40 MG: 20 CAPSULE ORAL at 08:13

## 2019-01-28 RX ADMIN — ENOXAPARIN SODIUM 40 MG: 40 INJECTION SUBCUTANEOUS at 17:38

## 2019-01-28 RX ADMIN — ACETAMINOPHEN 650 MG: 325 TABLET ORAL at 13:58

## 2019-01-29 LAB
GLUCOSE BLDC GLUCOMTR-MCNC: 111 MG/DL (ref 70–130)
GLUCOSE BLDC GLUCOMTR-MCNC: 116 MG/DL (ref 70–130)
GLUCOSE BLDC GLUCOMTR-MCNC: 190 MG/DL (ref 70–130)
GLUCOSE BLDC GLUCOMTR-MCNC: 229 MG/DL (ref 70–130)
POTASSIUM BLD-SCNC: 3.9 MMOL/L (ref 3.5–5.5)

## 2019-01-29 PROCEDURE — 25010000002 ENOXAPARIN PER 10 MG: Performed by: ORTHOPAEDIC SURGERY

## 2019-01-29 PROCEDURE — 82962 GLUCOSE BLOOD TEST: CPT

## 2019-01-29 PROCEDURE — 84132 ASSAY OF SERUM POTASSIUM: CPT | Performed by: INTERNAL MEDICINE

## 2019-01-29 PROCEDURE — 97110 THERAPEUTIC EXERCISES: CPT

## 2019-01-29 PROCEDURE — 97116 GAIT TRAINING THERAPY: CPT

## 2019-01-29 PROCEDURE — 97535 SELF CARE MNGMENT TRAINING: CPT | Performed by: OCCUPATIONAL THERAPIST

## 2019-01-29 PROCEDURE — 63710000001 INSULIN DETEMIR PER 5 UNITS: Performed by: PHYSICIAN ASSISTANT

## 2019-01-29 PROCEDURE — 99232 SBSQ HOSP IP/OBS MODERATE 35: CPT | Performed by: INTERNAL MEDICINE

## 2019-01-29 RX ADMIN — BUPROPION HYDROCHLORIDE 450 MG: 150 TABLET, FILM COATED, EXTENDED RELEASE ORAL at 09:39

## 2019-01-29 RX ADMIN — LISINOPRIL 2.5 MG: 5 TABLET ORAL at 09:39

## 2019-01-29 RX ADMIN — DOCUSATE SODIUM 100 MG: 100 CAPSULE, LIQUID FILLED ORAL at 09:39

## 2019-01-29 RX ADMIN — HYDROCODONE BITARTRATE AND ACETAMINOPHEN 1 TABLET: 7.5; 325 TABLET ORAL at 20:52

## 2019-01-29 RX ADMIN — FLUOXETINE HYDROCHLORIDE 40 MG: 20 CAPSULE ORAL at 09:39

## 2019-01-29 RX ADMIN — HYDROCODONE BITARTRATE AND ACETAMINOPHEN 1 TABLET: 7.5; 325 TABLET ORAL at 13:46

## 2019-01-29 RX ADMIN — TRAZODONE HYDROCHLORIDE 150 MG: 50 TABLET ORAL at 20:52

## 2019-01-29 RX ADMIN — DOCUSATE SODIUM 100 MG: 100 CAPSULE, LIQUID FILLED ORAL at 20:53

## 2019-01-29 RX ADMIN — VITAMIN D, TAB 1000IU (100/BT) 1000 UNITS: 25 TAB at 09:39

## 2019-01-29 RX ADMIN — INSULIN LISPRO 4 UNITS: 100 INJECTION, SOLUTION INTRAVENOUS; SUBCUTANEOUS at 17:15

## 2019-01-29 RX ADMIN — HYDROXYZINE HYDROCHLORIDE 100 MG: 25 TABLET, FILM COATED ORAL at 20:53

## 2019-01-29 RX ADMIN — INSULIN LISPRO 2 UNITS: 100 INJECTION, SOLUTION INTRAVENOUS; SUBCUTANEOUS at 09:42

## 2019-01-29 RX ADMIN — ENOXAPARIN SODIUM 40 MG: 40 INJECTION SUBCUTANEOUS at 17:15

## 2019-01-29 RX ADMIN — INSULIN DETEMIR 5 UNITS: 100 INJECTION, SOLUTION SUBCUTANEOUS at 20:54

## 2019-01-29 RX ADMIN — MULTIPLE VITAMINS W/ MINERALS TAB 1 TABLET: TAB ORAL at 09:39

## 2019-01-29 RX ADMIN — INSULIN LISPRO 4 UNITS: 100 INJECTION, SOLUTION INTRAVENOUS; SUBCUTANEOUS at 13:42

## 2019-01-29 RX ADMIN — ATORVASTATIN CALCIUM 40 MG: 40 TABLET, FILM COATED ORAL at 20:52

## 2019-01-29 RX ADMIN — INSULIN LISPRO 4 UNITS: 100 INJECTION, SOLUTION INTRAVENOUS; SUBCUTANEOUS at 09:42

## 2019-01-29 RX ADMIN — INSULIN LISPRO 4 UNITS: 100 INJECTION, SOLUTION INTRAVENOUS; SUBCUTANEOUS at 13:41

## 2019-01-30 VITALS
BODY MASS INDEX: 38.32 KG/M2 | TEMPERATURE: 98.4 F | HEIGHT: 65 IN | WEIGHT: 230 LBS | DIASTOLIC BLOOD PRESSURE: 68 MMHG | OXYGEN SATURATION: 98 % | SYSTOLIC BLOOD PRESSURE: 152 MMHG | HEART RATE: 77 BPM | RESPIRATION RATE: 18 BRPM

## 2019-01-30 LAB
GLUCOSE BLDC GLUCOMTR-MCNC: 153 MG/DL (ref 70–130)
GLUCOSE BLDC GLUCOMTR-MCNC: 165 MG/DL (ref 70–130)

## 2019-01-30 PROCEDURE — 99239 HOSP IP/OBS DSCHRG MGMT >30: CPT | Performed by: NURSE PRACTITIONER

## 2019-01-30 PROCEDURE — 97116 GAIT TRAINING THERAPY: CPT

## 2019-01-30 PROCEDURE — 97110 THERAPEUTIC EXERCISES: CPT

## 2019-01-30 PROCEDURE — 82962 GLUCOSE BLOOD TEST: CPT

## 2019-01-30 PROCEDURE — 97530 THERAPEUTIC ACTIVITIES: CPT

## 2019-01-30 RX ORDER — PSEUDOEPHEDRINE HCL 30 MG
100 TABLET ORAL 2 TIMES DAILY
Status: ON HOLD
Start: 2019-01-30 | End: 2022-09-22

## 2019-01-30 RX ORDER — INSULIN GLARGINE 100 [IU]/ML
7 INJECTION, SOLUTION SUBCUTANEOUS NIGHTLY
Refills: 12 | Status: ON HOLD
Start: 2019-01-30 | End: 2022-09-22

## 2019-01-30 RX ORDER — HYDROCODONE BITARTRATE AND ACETAMINOPHEN 7.5; 325 MG/1; MG/1
1 TABLET ORAL EVERY 6 HOURS PRN
Qty: 12 TABLET | Refills: 0 | Status: ON HOLD | OUTPATIENT
Start: 2019-01-30 | End: 2022-09-22

## 2019-01-30 RX ORDER — LISINOPRIL 5 MG/1
5 TABLET ORAL
Status: DISCONTINUED | OUTPATIENT
Start: 2019-01-30 | End: 2019-01-30 | Stop reason: HOSPADM

## 2019-01-30 RX ORDER — LISINOPRIL 5 MG/1
5 TABLET ORAL
Status: ON HOLD
Start: 2019-01-31 | End: 2022-09-22

## 2019-01-30 RX ORDER — ACETAMINOPHEN 325 MG/1
650 TABLET ORAL EVERY 4 HOURS PRN
Status: ON HOLD
Start: 2019-01-30 | End: 2022-09-22

## 2019-01-30 RX ADMIN — DOCUSATE SODIUM 100 MG: 100 CAPSULE, LIQUID FILLED ORAL at 08:51

## 2019-01-30 RX ADMIN — INSULIN LISPRO 4 UNITS: 100 INJECTION, SOLUTION INTRAVENOUS; SUBCUTANEOUS at 08:51

## 2019-01-30 RX ADMIN — MULTIPLE VITAMINS W/ MINERALS TAB 1 TABLET: TAB ORAL at 08:51

## 2019-01-30 RX ADMIN — FLUOXETINE HYDROCHLORIDE 40 MG: 20 CAPSULE ORAL at 08:51

## 2019-01-30 RX ADMIN — INSULIN LISPRO 2 UNITS: 100 INJECTION, SOLUTION INTRAVENOUS; SUBCUTANEOUS at 12:43

## 2019-01-30 RX ADMIN — HYDROCODONE BITARTRATE AND ACETAMINOPHEN 1 TABLET: 7.5; 325 TABLET ORAL at 10:04

## 2019-01-30 RX ADMIN — INSULIN LISPRO 2 UNITS: 100 INJECTION, SOLUTION INTRAVENOUS; SUBCUTANEOUS at 08:52

## 2019-01-30 RX ADMIN — VITAMIN D, TAB 1000IU (100/BT) 1000 UNITS: 25 TAB at 08:51

## 2019-01-30 RX ADMIN — BUPROPION HYDROCHLORIDE 450 MG: 150 TABLET, FILM COATED, EXTENDED RELEASE ORAL at 08:51

## 2019-01-30 RX ADMIN — LISINOPRIL 5 MG: 5 TABLET ORAL at 08:51

## 2019-01-30 RX ADMIN — INSULIN LISPRO 4 UNITS: 100 INJECTION, SOLUTION INTRAVENOUS; SUBCUTANEOUS at 12:44

## 2021-02-26 ENCOUNTER — IMMUNIZATION (OUTPATIENT)
Dept: VACCINE CLINIC | Facility: HOSPITAL | Age: 71
End: 2021-02-26

## 2021-02-26 PROCEDURE — 91301 HC SARSCO02 VAC 100MCG/0.5ML IM: CPT | Performed by: INTERNAL MEDICINE

## 2021-02-26 PROCEDURE — 0011A: CPT | Performed by: INTERNAL MEDICINE

## 2021-03-26 ENCOUNTER — IMMUNIZATION (OUTPATIENT)
Dept: VACCINE CLINIC | Facility: HOSPITAL | Age: 71
End: 2021-03-26

## 2021-03-26 PROCEDURE — 0012A: CPT | Performed by: PHYSICIAN ASSISTANT

## 2021-03-26 PROCEDURE — 91301 HC SARSCO02 VAC 100MCG/0.5ML IM: CPT | Performed by: PHYSICIAN ASSISTANT

## 2022-09-21 ENCOUNTER — APPOINTMENT (OUTPATIENT)
Dept: CT IMAGING | Facility: HOSPITAL | Age: 72
End: 2022-09-21

## 2022-09-21 ENCOUNTER — HOSPITAL ENCOUNTER (INPATIENT)
Facility: HOSPITAL | Age: 72
LOS: 14 days | Discharge: NURSING, REHAB OR LONG-TERM CARE | End: 2022-10-05
Attending: INTERNAL MEDICINE | Admitting: INTERNAL MEDICINE

## 2022-09-21 ENCOUNTER — APPOINTMENT (OUTPATIENT)
Dept: GENERAL RADIOLOGY | Facility: HOSPITAL | Age: 72
End: 2022-09-21

## 2022-09-21 DIAGNOSIS — R47.1 DYSARTHRIA: ICD-10-CM

## 2022-09-21 DIAGNOSIS — R41.841 COGNITIVE COMMUNICATION DEFICIT: ICD-10-CM

## 2022-09-21 DIAGNOSIS — R13.12 OROPHARYNGEAL DYSPHAGIA: Primary | ICD-10-CM

## 2022-09-21 PROBLEM — I63.511 ACUTE ISCHEMIC RIGHT MCA STROKE: Status: ACTIVE | Noted: 2022-09-21

## 2022-09-21 PROBLEM — Z87.81 S/P LEFT HIP FRACTURE: Status: ACTIVE | Noted: 2022-09-21

## 2022-09-21 LAB
ALBUMIN SERPL-MCNC: 3.1 G/DL (ref 3.5–5.2)
ALBUMIN/GLOB SERPL: 1 G/DL
ALP SERPL-CCNC: 85 U/L (ref 39–117)
ALT SERPL W P-5'-P-CCNC: 33 U/L (ref 1–33)
ANION GAP SERPL CALCULATED.3IONS-SCNC: 16 MMOL/L (ref 5–15)
AST SERPL-CCNC: 36 U/L (ref 1–32)
BASOPHILS # BLD AUTO: 0.02 10*3/MM3 (ref 0–0.2)
BASOPHILS NFR BLD AUTO: 0.2 % (ref 0–1.5)
BILIRUB SERPL-MCNC: 0.6 MG/DL (ref 0–1.2)
BUN SERPL-MCNC: 29 MG/DL (ref 8–23)
BUN/CREAT SERPL: 15.5 (ref 7–25)
CALCIUM SPEC-SCNC: 8.5 MG/DL (ref 8.6–10.5)
CHLORIDE SERPL-SCNC: 101 MMOL/L (ref 98–107)
CO2 SERPL-SCNC: 17 MMOL/L (ref 22–29)
CREAT SERPL-MCNC: 1.87 MG/DL (ref 0.57–1)
DEPRECATED RDW RBC AUTO: 42.8 FL (ref 37–54)
EGFRCR SERPLBLD CKD-EPI 2021: 28.3 ML/MIN/1.73
EOSINOPHIL # BLD AUTO: 0.02 10*3/MM3 (ref 0–0.4)
EOSINOPHIL NFR BLD AUTO: 0.2 % (ref 0.3–6.2)
ERYTHROCYTE [DISTWIDTH] IN BLOOD BY AUTOMATED COUNT: 13 % (ref 12.3–15.4)
GLOBULIN UR ELPH-MCNC: 3 GM/DL
GLUCOSE BLDC GLUCOMTR-MCNC: 136 MG/DL (ref 70–130)
GLUCOSE BLDC GLUCOMTR-MCNC: 152 MG/DL (ref 70–130)
GLUCOSE BLDC GLUCOMTR-MCNC: 230 MG/DL (ref 70–130)
GLUCOSE SERPL-MCNC: 211 MG/DL (ref 65–99)
HCT VFR BLD AUTO: 39.4 % (ref 34–46.6)
HGB BLD-MCNC: 12.8 G/DL (ref 12–15.9)
IMM GRANULOCYTES # BLD AUTO: 0.05 10*3/MM3 (ref 0–0.05)
IMM GRANULOCYTES NFR BLD AUTO: 0.4 % (ref 0–0.5)
LYMPHOCYTES # BLD AUTO: 1.02 10*3/MM3 (ref 0.7–3.1)
LYMPHOCYTES NFR BLD AUTO: 8.8 % (ref 19.6–45.3)
MAGNESIUM SERPL-MCNC: 2 MG/DL (ref 1.6–2.4)
MCH RBC QN AUTO: 29.4 PG (ref 26.6–33)
MCHC RBC AUTO-ENTMCNC: 32.5 G/DL (ref 31.5–35.7)
MCV RBC AUTO: 90.6 FL (ref 79–97)
MONOCYTES # BLD AUTO: 1.02 10*3/MM3 (ref 0.1–0.9)
MONOCYTES NFR BLD AUTO: 8.8 % (ref 5–12)
NEUTROPHILS NFR BLD AUTO: 81.6 % (ref 42.7–76)
NEUTROPHILS NFR BLD AUTO: 9.44 10*3/MM3 (ref 1.7–7)
NRBC BLD AUTO-RTO: 0 /100 WBC (ref 0–0.2)
PLATELET # BLD AUTO: 170 10*3/MM3 (ref 140–450)
PMV BLD AUTO: 10.4 FL (ref 6–12)
POTASSIUM SERPL-SCNC: 5.2 MMOL/L (ref 3.5–5.2)
PROT SERPL-MCNC: 6.1 G/DL (ref 6–8.5)
QT INTERVAL: 408 MS
QT INTERVAL: 460 MS
QTC INTERVAL: 523 MS
QTC INTERVAL: 568 MS
RBC # BLD AUTO: 4.35 10*6/MM3 (ref 3.77–5.28)
SODIUM SERPL-SCNC: 134 MMOL/L (ref 136–145)
TROPONIN T SERPL-MCNC: 0.48 NG/ML (ref 0–0.03)
WBC NRBC COR # BLD: 11.57 10*3/MM3 (ref 3.4–10.8)

## 2022-09-21 PROCEDURE — 25010000002 MIDAZOLAM PER 1 MG: Performed by: RADIOLOGY

## 2022-09-21 PROCEDURE — C1760 CLOSURE DEV, VASC: HCPCS | Performed by: RADIOLOGY

## 2022-09-21 PROCEDURE — C1887 CATHETER, GUIDING: HCPCS | Performed by: RADIOLOGY

## 2022-09-21 PROCEDURE — 93010 ELECTROCARDIOGRAM REPORT: CPT | Performed by: INTERNAL MEDICINE

## 2022-09-21 PROCEDURE — 99152 MOD SED SAME PHYS/QHP 5/>YRS: CPT | Performed by: RADIOLOGY

## 2022-09-21 PROCEDURE — 63710000001 INSULIN LISPRO (HUMAN) PER 5 UNITS: Performed by: INTERNAL MEDICINE

## 2022-09-21 PROCEDURE — 99223 1ST HOSP IP/OBS HIGH 75: CPT | Performed by: NURSE PRACTITIONER

## 2022-09-21 PROCEDURE — 74018 RADEX ABDOMEN 1 VIEW: CPT

## 2022-09-21 PROCEDURE — 61645 PERQ ART M-THROMBECT &/NFS: CPT | Performed by: RADIOLOGY

## 2022-09-21 PROCEDURE — 92610 EVALUATE SWALLOWING FUNCTION: CPT

## 2022-09-21 PROCEDURE — 93005 ELECTROCARDIOGRAM TRACING: CPT | Performed by: INTERNAL MEDICINE

## 2022-09-21 PROCEDURE — 93005 ELECTROCARDIOGRAM TRACING: CPT | Performed by: NURSE PRACTITIONER

## 2022-09-21 PROCEDURE — C1894 INTRO/SHEATH, NON-LASER: HCPCS | Performed by: RADIOLOGY

## 2022-09-21 PROCEDURE — 84484 ASSAY OF TROPONIN QUANT: CPT | Performed by: NURSE PRACTITIONER

## 2022-09-21 PROCEDURE — 80053 COMPREHEN METABOLIC PANEL: CPT | Performed by: NURSE PRACTITIONER

## 2022-09-21 PROCEDURE — 99221 1ST HOSP IP/OBS SF/LOW 40: CPT | Performed by: RADIOLOGY

## 2022-09-21 PROCEDURE — 71045 X-RAY EXAM CHEST 1 VIEW: CPT

## 2022-09-21 PROCEDURE — 99222 1ST HOSP IP/OBS MODERATE 55: CPT | Performed by: INTERNAL MEDICINE

## 2022-09-21 PROCEDURE — 63710000001 INSULIN REGULAR HUMAN PER 5 UNITS: Performed by: INTERNAL MEDICINE

## 2022-09-21 PROCEDURE — 70450 CT HEAD/BRAIN W/O DYE: CPT

## 2022-09-21 PROCEDURE — 0 IODIXANOL PER 1 ML: Performed by: RADIOLOGY

## 2022-09-21 PROCEDURE — C1769 GUIDE WIRE: HCPCS | Performed by: RADIOLOGY

## 2022-09-21 PROCEDURE — 25010000002 FENTANYL CITRATE (PF) 50 MCG/ML SOLUTION: Performed by: RADIOLOGY

## 2022-09-21 PROCEDURE — 03CG3Z7 EXTIRPATION OF MATTER FROM INTRACRANIAL ARTERY USING STENT RETRIEVER, PERCUTANEOUS APPROACH: ICD-10-PCS | Performed by: RADIOLOGY

## 2022-09-21 PROCEDURE — C2628 CATHETER, OCCLUSION: HCPCS | Performed by: RADIOLOGY

## 2022-09-21 PROCEDURE — 85025 COMPLETE CBC W/AUTO DIFF WBC: CPT | Performed by: NURSE PRACTITIONER

## 2022-09-21 PROCEDURE — 99153 MOD SED SAME PHYS/QHP EA: CPT | Performed by: RADIOLOGY

## 2022-09-21 PROCEDURE — 83735 ASSAY OF MAGNESIUM: CPT | Performed by: INTERNAL MEDICINE

## 2022-09-21 PROCEDURE — 82962 GLUCOSE BLOOD TEST: CPT

## 2022-09-21 RX ORDER — SODIUM CHLORIDE 9 MG/ML
75 INJECTION, SOLUTION INTRAVENOUS CONTINUOUS
Status: DISCONTINUED | OUTPATIENT
Start: 2022-09-21 | End: 2022-09-23

## 2022-09-21 RX ORDER — IODIXANOL 320 MG/ML
INJECTION, SOLUTION INTRAVASCULAR AS NEEDED
Status: DISCONTINUED | OUTPATIENT
Start: 2022-09-21 | End: 2022-09-21 | Stop reason: HOSPADM

## 2022-09-21 RX ORDER — ASPIRIN 300 MG/1
300 SUPPOSITORY RECTAL DAILY
Status: DISCONTINUED | OUTPATIENT
Start: 2022-09-22 | End: 2022-09-22

## 2022-09-21 RX ORDER — SODIUM CHLORIDE 0.9 % (FLUSH) 0.9 %
10 SYRINGE (ML) INJECTION AS NEEDED
Status: DISCONTINUED | OUTPATIENT
Start: 2022-09-21 | End: 2022-10-05 | Stop reason: HOSPADM

## 2022-09-21 RX ORDER — INSULIN LISPRO 100 [IU]/ML
0-7 INJECTION, SOLUTION INTRAVENOUS; SUBCUTANEOUS
Status: DISCONTINUED | OUTPATIENT
Start: 2022-09-21 | End: 2022-09-21

## 2022-09-21 RX ORDER — NICOTINE POLACRILEX 4 MG
15 LOZENGE BUCCAL
Status: DISCONTINUED | OUTPATIENT
Start: 2022-09-21 | End: 2022-09-22

## 2022-09-21 RX ORDER — ASPIRIN 325 MG
325 TABLET ORAL DAILY
Status: DISCONTINUED | OUTPATIENT
Start: 2022-09-22 | End: 2022-09-22

## 2022-09-21 RX ORDER — ATORVASTATIN CALCIUM 40 MG/1
80 TABLET, FILM COATED ORAL NIGHTLY
Status: DISCONTINUED | OUTPATIENT
Start: 2022-09-21 | End: 2022-09-22

## 2022-09-21 RX ORDER — SODIUM CHLORIDE 0.9 % (FLUSH) 0.9 %
10 SYRINGE (ML) INJECTION EVERY 12 HOURS SCHEDULED
Status: DISCONTINUED | OUTPATIENT
Start: 2022-09-21 | End: 2022-10-05 | Stop reason: HOSPADM

## 2022-09-21 RX ORDER — LIDOCAINE HYDROCHLORIDE 10 MG/ML
INJECTION, SOLUTION EPIDURAL; INFILTRATION; INTRACAUDAL; PERINEURAL AS NEEDED
Status: DISCONTINUED | OUTPATIENT
Start: 2022-09-21 | End: 2022-09-21 | Stop reason: HOSPADM

## 2022-09-21 RX ORDER — MIDAZOLAM HYDROCHLORIDE 1 MG/ML
INJECTION INTRAMUSCULAR; INTRAVENOUS AS NEEDED
Status: DISCONTINUED | OUTPATIENT
Start: 2022-09-21 | End: 2022-09-21 | Stop reason: HOSPADM

## 2022-09-21 RX ORDER — FENTANYL CITRATE 50 UG/ML
INJECTION, SOLUTION INTRAMUSCULAR; INTRAVENOUS AS NEEDED
Status: DISCONTINUED | OUTPATIENT
Start: 2022-09-21 | End: 2022-09-21 | Stop reason: HOSPADM

## 2022-09-21 RX ORDER — ONDANSETRON 2 MG/ML
4 INJECTION INTRAMUSCULAR; INTRAVENOUS EVERY 6 HOURS PRN
Status: DISCONTINUED | OUTPATIENT
Start: 2022-09-21 | End: 2022-10-05 | Stop reason: HOSPADM

## 2022-09-21 RX ORDER — DEXTROSE MONOHYDRATE 25 G/50ML
25 INJECTION, SOLUTION INTRAVENOUS
Status: DISCONTINUED | OUTPATIENT
Start: 2022-09-21 | End: 2022-10-05 | Stop reason: HOSPADM

## 2022-09-21 RX ORDER — FAMOTIDINE 20 MG/1
20 TABLET, FILM COATED ORAL
Status: DISCONTINUED | OUTPATIENT
Start: 2022-09-21 | End: 2022-09-21

## 2022-09-21 RX ORDER — FAMOTIDINE 10 MG/ML
20 INJECTION, SOLUTION INTRAVENOUS NIGHTLY
Status: DISCONTINUED | OUTPATIENT
Start: 2022-09-22 | End: 2022-09-26

## 2022-09-21 RX ADMIN — SODIUM CHLORIDE 75 ML/HR: 9 INJECTION, SOLUTION INTRAVENOUS at 17:35

## 2022-09-21 RX ADMIN — Medication 10 ML: at 11:57

## 2022-09-21 RX ADMIN — INSULIN LISPRO 3 UNITS: 100 INJECTION, SOLUTION INTRAVENOUS; SUBCUTANEOUS at 12:49

## 2022-09-21 RX ADMIN — INSULIN HUMAN 2 UNITS: 100 INJECTION, SOLUTION PARENTERAL at 18:23

## 2022-09-21 RX ADMIN — Medication 10 ML: at 23:15

## 2022-09-21 RX ADMIN — SODIUM CHLORIDE 75 ML/HR: 9 INJECTION, SOLUTION INTRAVENOUS at 13:13

## 2022-09-21 RX ADMIN — SODIUM CHLORIDE 500 ML: 9 INJECTION, SOLUTION INTRAVENOUS at 12:28

## 2022-09-22 ENCOUNTER — ANCILLARY PROCEDURE (OUTPATIENT)
Dept: SPEECH THERAPY | Facility: HOSPITAL | Age: 72
End: 2022-09-22

## 2022-09-22 ENCOUNTER — APPOINTMENT (OUTPATIENT)
Dept: GENERAL RADIOLOGY | Facility: HOSPITAL | Age: 72
End: 2022-09-22

## 2022-09-22 ENCOUNTER — APPOINTMENT (OUTPATIENT)
Dept: CT IMAGING | Facility: HOSPITAL | Age: 72
End: 2022-09-22

## 2022-09-22 ENCOUNTER — APPOINTMENT (OUTPATIENT)
Dept: CARDIOLOGY | Facility: HOSPITAL | Age: 72
End: 2022-09-22

## 2022-09-22 PROBLEM — R13.10 DYSPHAGIA, UNSPECIFIED TYPE: Status: ACTIVE | Noted: 2022-09-22

## 2022-09-22 PROBLEM — I63.9 ACUTE ISCHEMIC STROKE (HCC): Status: ACTIVE | Noted: 2022-09-22

## 2022-09-22 LAB
ALBUMIN SERPL-MCNC: 2.8 G/DL (ref 3.5–5.2)
ALBUMIN/GLOB SERPL: 1.2 G/DL
ALP SERPL-CCNC: 71 U/L (ref 39–117)
ALT SERPL W P-5'-P-CCNC: 26 U/L (ref 1–33)
ANION GAP SERPL CALCULATED.3IONS-SCNC: 16 MMOL/L (ref 5–15)
AST SERPL-CCNC: 27 U/L (ref 1–32)
BASOPHILS # BLD AUTO: 0.01 10*3/MM3 (ref 0–0.2)
BASOPHILS NFR BLD AUTO: 0.1 % (ref 0–1.5)
BH CV ECHO MEAS - AO MAX PG: 6.7 MMHG
BH CV ECHO MEAS - AO MEAN PG: 4 MMHG
BH CV ECHO MEAS - AO ROOT DIAM: 3.2 CM
BH CV ECHO MEAS - AO V2 MAX: 129 CM/SEC
BH CV ECHO MEAS - AO V2 VTI: 25.1 CM
BH CV ECHO MEAS - AVA(I,D): 2.7 CM2
BH CV ECHO MEAS - EDV(CUBED): 74.1 ML
BH CV ECHO MEAS - EDV(MOD-SP2): 88.9 ML
BH CV ECHO MEAS - EDV(MOD-SP4): 88.6 ML
BH CV ECHO MEAS - EF(MOD-BP): 34.6 %
BH CV ECHO MEAS - EF(MOD-SP2): 24 %
BH CV ECHO MEAS - EF(MOD-SP4): 44.1 %
BH CV ECHO MEAS - ESV(CUBED): 50.7 ML
BH CV ECHO MEAS - ESV(MOD-SP2): 67.6 ML
BH CV ECHO MEAS - ESV(MOD-SP4): 49.5 ML
BH CV ECHO MEAS - FS: 11.9 %
BH CV ECHO MEAS - IVS/LVPW: 1.08 CM
BH CV ECHO MEAS - IVSD: 1.3 CM
BH CV ECHO MEAS - LA DIMENSION: 3.9 CM
BH CV ECHO MEAS - LAT PEAK E' VEL: 5.4 CM/SEC
BH CV ECHO MEAS - LV MASS(C)D: 189.2 GRAMS
BH CV ECHO MEAS - LV MAX PG: 5.3 MMHG
BH CV ECHO MEAS - LV MEAN PG: 3 MMHG
BH CV ECHO MEAS - LV V1 MAX: 115 CM/SEC
BH CV ECHO MEAS - LV V1 VTI: 21.9 CM
BH CV ECHO MEAS - LVIDD: 4.2 CM
BH CV ECHO MEAS - LVIDS: 3.7 CM
BH CV ECHO MEAS - LVOT AREA: 3.1 CM2
BH CV ECHO MEAS - LVOT DIAM: 2 CM
BH CV ECHO MEAS - LVPWD: 1.2 CM
BH CV ECHO MEAS - MED PEAK E' VEL: 7.3 CM/SEC
BH CV ECHO MEAS - MV DEC TIME: 0.11 MSEC
BH CV ECHO MEAS - MV E MAX VEL: 145 CM/SEC
BH CV ECHO MEAS - MV MAX PG: 7.7 MMHG
BH CV ECHO MEAS - MV MEAN PG: 4 MMHG
BH CV ECHO MEAS - MV V2 VTI: 19.2 CM
BH CV ECHO MEAS - MVA(VTI): 3.6 CM2
BH CV ECHO MEAS - PA ACC TIME: 0.11 SEC
BH CV ECHO MEAS - PA PR(ACCEL): 31.3 MMHG
BH CV ECHO MEAS - PI END-D VEL: 146 CM/SEC
BH CV ECHO MEAS - RAP SYSTOLE: 3 MMHG
BH CV ECHO MEAS - RVSP: 13 MMHG
BH CV ECHO MEAS - SV(LVOT): 68.8 ML
BH CV ECHO MEAS - SV(MOD-SP2): 21.3 ML
BH CV ECHO MEAS - SV(MOD-SP4): 39.1 ML
BH CV ECHO MEAS - TAPSE (>1.6): 1.6 CM
BH CV ECHO MEAS - TR MAX PG: 10.2 MMHG
BH CV ECHO MEAS - TR MAX VEL: 160 CM/SEC
BH CV ECHO MEASUREMENTS AVERAGE E/E' RATIO: 22.83
BH CV VAS BP RIGHT ARM: NORMAL MMHG
BH CV XLRA - RV BASE: 3.2 CM
BH CV XLRA - RV LENGTH: 6.5 CM
BH CV XLRA - RV MID: 2.4 CM
BH CV XLRA - TDI S': 16 CM/SEC
BILIRUB SERPL-MCNC: 0.4 MG/DL (ref 0–1.2)
BUN SERPL-MCNC: 27 MG/DL (ref 8–23)
BUN/CREAT SERPL: 16.4 (ref 7–25)
CALCIUM SPEC-SCNC: 8 MG/DL (ref 8.6–10.5)
CHLORIDE SERPL-SCNC: 108 MMOL/L (ref 98–107)
CHOLEST SERPL-MCNC: 119 MG/DL (ref 0–200)
CO2 SERPL-SCNC: 16 MMOL/L (ref 22–29)
CREAT SERPL-MCNC: 1.65 MG/DL (ref 0.57–1)
DEPRECATED RDW RBC AUTO: 42.6 FL (ref 37–54)
EGFRCR SERPLBLD CKD-EPI 2021: 32.9 ML/MIN/1.73
EOSINOPHIL # BLD AUTO: 0.09 10*3/MM3 (ref 0–0.4)
EOSINOPHIL NFR BLD AUTO: 1 % (ref 0.3–6.2)
ERYTHROCYTE [DISTWIDTH] IN BLOOD BY AUTOMATED COUNT: 12.8 % (ref 12.3–15.4)
GLOBULIN UR ELPH-MCNC: 2.4 GM/DL
GLUCOSE BLDC GLUCOMTR-MCNC: 113 MG/DL (ref 70–130)
GLUCOSE BLDC GLUCOMTR-MCNC: 138 MG/DL (ref 70–130)
GLUCOSE BLDC GLUCOMTR-MCNC: 162 MG/DL (ref 70–130)
GLUCOSE BLDC GLUCOMTR-MCNC: 176 MG/DL (ref 70–130)
GLUCOSE SERPL-MCNC: 131 MG/DL (ref 65–99)
HBA1C MFR BLD: 5.7 % (ref 4.8–5.6)
HCT VFR BLD AUTO: 35 % (ref 34–46.6)
HDLC SERPL-MCNC: 53 MG/DL (ref 40–60)
HGB BLD-MCNC: 11.3 G/DL (ref 12–15.9)
IMM GRANULOCYTES # BLD AUTO: 0.03 10*3/MM3 (ref 0–0.05)
IMM GRANULOCYTES NFR BLD AUTO: 0.3 % (ref 0–0.5)
LDLC SERPL CALC-MCNC: 51 MG/DL (ref 0–100)
LDLC/HDLC SERPL: 0.96 {RATIO}
LEFT ATRIUM VOLUME INDEX: 26.9 ML/M2
LV EF 2D ECHO EST: 30 %
LYMPHOCYTES # BLD AUTO: 0.92 10*3/MM3 (ref 0.7–3.1)
LYMPHOCYTES NFR BLD AUTO: 9.8 % (ref 19.6–45.3)
MAGNESIUM SERPL-MCNC: 1.8 MG/DL (ref 1.6–2.4)
MAXIMAL PREDICTED HEART RATE: 148 BPM
MCH RBC QN AUTO: 29.5 PG (ref 26.6–33)
MCHC RBC AUTO-ENTMCNC: 32.3 G/DL (ref 31.5–35.7)
MCV RBC AUTO: 91.4 FL (ref 79–97)
MONOCYTES # BLD AUTO: 1.15 10*3/MM3 (ref 0.1–0.9)
MONOCYTES NFR BLD AUTO: 12.3 % (ref 5–12)
NEUTROPHILS NFR BLD AUTO: 7.17 10*3/MM3 (ref 1.7–7)
NEUTROPHILS NFR BLD AUTO: 76.5 % (ref 42.7–76)
NRBC BLD AUTO-RTO: 0 /100 WBC (ref 0–0.2)
PHOSPHATE SERPL-MCNC: 3.5 MG/DL (ref 2.5–4.5)
PLATELET # BLD AUTO: 142 10*3/MM3 (ref 140–450)
PMV BLD AUTO: 10.4 FL (ref 6–12)
POTASSIUM SERPL-SCNC: 4.8 MMOL/L (ref 3.5–5.2)
PROT SERPL-MCNC: 5.2 G/DL (ref 6–8.5)
QT INTERVAL: 364 MS
QT INTERVAL: 374 MS
QTC INTERVAL: 467 MS
QTC INTERVAL: 469 MS
RBC # BLD AUTO: 3.83 10*6/MM3 (ref 3.77–5.28)
SODIUM SERPL-SCNC: 140 MMOL/L (ref 136–145)
STRESS TARGET HR: 126 BPM
TRIGL SERPL-MCNC: 75 MG/DL (ref 0–150)
TSH SERPL DL<=0.05 MIU/L-ACNC: 1.06 UIU/ML (ref 0.27–4.2)
VLDLC SERPL-MCNC: 15 MG/DL (ref 5–40)
WBC NRBC COR # BLD: 9.37 10*3/MM3 (ref 3.4–10.8)

## 2022-09-22 PROCEDURE — 97162 PT EVAL MOD COMPLEX 30 MIN: CPT

## 2022-09-22 PROCEDURE — 92612 ENDOSCOPY SWALLOW (FEES) VID: CPT

## 2022-09-22 PROCEDURE — 80061 LIPID PANEL: CPT | Performed by: NURSE PRACTITIONER

## 2022-09-22 PROCEDURE — 92610 EVALUATE SWALLOWING FUNCTION: CPT

## 2022-09-22 PROCEDURE — 84100 ASSAY OF PHOSPHORUS: CPT | Performed by: INTERNAL MEDICINE

## 2022-09-22 PROCEDURE — 85025 COMPLETE CBC W/AUTO DIFF WBC: CPT | Performed by: INTERNAL MEDICINE

## 2022-09-22 PROCEDURE — 92523 SPEECH SOUND LANG COMPREHEN: CPT

## 2022-09-22 PROCEDURE — 93005 ELECTROCARDIOGRAM TRACING: CPT | Performed by: INTERNAL MEDICINE

## 2022-09-22 PROCEDURE — 99233 SBSQ HOSP IP/OBS HIGH 50: CPT | Performed by: INTERNAL MEDICINE

## 2022-09-22 PROCEDURE — 93005 ELECTROCARDIOGRAM TRACING: CPT | Performed by: NURSE PRACTITIONER

## 2022-09-22 PROCEDURE — 99291 CRITICAL CARE FIRST HOUR: CPT | Performed by: STUDENT IN AN ORGANIZED HEALTH CARE EDUCATION/TRAINING PROGRAM

## 2022-09-22 PROCEDURE — 74018 RADEX ABDOMEN 1 VIEW: CPT

## 2022-09-22 PROCEDURE — 83735 ASSAY OF MAGNESIUM: CPT | Performed by: INTERNAL MEDICINE

## 2022-09-22 PROCEDURE — 83036 HEMOGLOBIN GLYCOSYLATED A1C: CPT | Performed by: NURSE PRACTITIONER

## 2022-09-22 PROCEDURE — 93010 ELECTROCARDIOGRAM REPORT: CPT | Performed by: STUDENT IN AN ORGANIZED HEALTH CARE EDUCATION/TRAINING PROGRAM

## 2022-09-22 PROCEDURE — 63710000001 INSULIN REGULAR HUMAN PER 5 UNITS: Performed by: INTERNAL MEDICINE

## 2022-09-22 PROCEDURE — 97165 OT EVAL LOW COMPLEX 30 MIN: CPT

## 2022-09-22 PROCEDURE — 93306 TTE W/DOPPLER COMPLETE: CPT | Performed by: INTERNAL MEDICINE

## 2022-09-22 PROCEDURE — 82962 GLUCOSE BLOOD TEST: CPT

## 2022-09-22 PROCEDURE — 93010 ELECTROCARDIOGRAM REPORT: CPT | Performed by: INTERNAL MEDICINE

## 2022-09-22 PROCEDURE — 80053 COMPREHEN METABOLIC PANEL: CPT | Performed by: INTERNAL MEDICINE

## 2022-09-22 PROCEDURE — 70450 CT HEAD/BRAIN W/O DYE: CPT

## 2022-09-22 PROCEDURE — 93306 TTE W/DOPPLER COMPLETE: CPT

## 2022-09-22 PROCEDURE — 84443 ASSAY THYROID STIM HORMONE: CPT | Performed by: INTERNAL MEDICINE

## 2022-09-22 RX ORDER — NICOTINE POLACRILEX 4 MG
15 LOZENGE BUCCAL
Status: DISCONTINUED | OUTPATIENT
Start: 2022-09-22 | End: 2022-10-01

## 2022-09-22 RX ORDER — BUPROPION HYDROCHLORIDE 75 MG/1
150 TABLET ORAL EVERY 12 HOURS SCHEDULED
Status: DISCONTINUED | OUTPATIENT
Start: 2022-09-22 | End: 2022-09-22

## 2022-09-22 RX ORDER — OXYBUTYNIN CHLORIDE 5 MG/1
2.5 TABLET ORAL 2 TIMES DAILY
Status: DISCONTINUED | OUTPATIENT
Start: 2022-09-22 | End: 2022-09-23

## 2022-09-22 RX ORDER — PANTOPRAZOLE SODIUM 20 MG/1
20 TABLET, DELAYED RELEASE ORAL DAILY
COMMUNITY

## 2022-09-22 RX ORDER — OXYBUTYNIN CHLORIDE 5 MG/1
2.5 TABLET ORAL 2 TIMES DAILY
Status: DISCONTINUED | OUTPATIENT
Start: 2022-09-22 | End: 2022-09-22

## 2022-09-22 RX ORDER — FESOTERODINE FUMARATE 8 MG/1
8 TABLET, EXTENDED RELEASE ORAL NIGHTLY
Status: ON HOLD | COMMUNITY
End: 2022-09-22

## 2022-09-22 RX ORDER — ESTRADIOL 0.1 MG/G
1 CREAM VAGINAL 3 TIMES WEEKLY
COMMUNITY

## 2022-09-22 RX ORDER — LEVOCETIRIZINE DIHYDROCHLORIDE 5 MG/1
5 TABLET, FILM COATED ORAL EVERY EVENING
COMMUNITY

## 2022-09-22 RX ORDER — ATORVASTATIN CALCIUM 40 MG/1
80 TABLET, FILM COATED ORAL NIGHTLY
Status: DISCONTINUED | OUTPATIENT
Start: 2022-09-22 | End: 2022-09-23

## 2022-09-22 RX ORDER — TRAMADOL HYDROCHLORIDE 50 MG/1
50 TABLET ORAL EVERY 8 HOURS PRN
COMMUNITY

## 2022-09-22 RX ORDER — TIZANIDINE 4 MG/1
4 TABLET ORAL 2 TIMES DAILY
COMMUNITY

## 2022-09-22 RX ORDER — OXYBUTYNIN CHLORIDE 5 MG/1
5 TABLET, EXTENDED RELEASE ORAL DAILY
COMMUNITY

## 2022-09-22 RX ORDER — ASPIRIN 325 MG
325 TABLET ORAL DAILY
Status: DISCONTINUED | OUTPATIENT
Start: 2022-09-23 | End: 2022-10-01

## 2022-09-22 RX ORDER — ASPIRIN 300 MG/1
300 SUPPOSITORY RECTAL DAILY
Status: DISCONTINUED | OUTPATIENT
Start: 2022-09-23 | End: 2022-10-01

## 2022-09-22 RX ORDER — BUPROPION HYDROCHLORIDE 75 MG/1
150 TABLET ORAL EVERY 12 HOURS SCHEDULED
Status: DISCONTINUED | OUTPATIENT
Start: 2022-09-22 | End: 2022-09-23

## 2022-09-22 RX ADMIN — SODIUM CHLORIDE 75 ML/HR: 9 INJECTION, SOLUTION INTRAVENOUS at 21:42

## 2022-09-22 RX ADMIN — ASPIRIN 300 MG: 300 SUPPOSITORY RECTAL at 10:13

## 2022-09-22 RX ADMIN — FAMOTIDINE 20 MG: 10 INJECTION, SOLUTION INTRAVENOUS at 22:02

## 2022-09-22 RX ADMIN — INSULIN HUMAN 2 UNITS: 100 INJECTION, SOLUTION PARENTERAL at 06:39

## 2022-09-22 RX ADMIN — SODIUM CHLORIDE 75 ML/HR: 9 INJECTION, SOLUTION INTRAVENOUS at 06:40

## 2022-09-22 RX ADMIN — INSULIN HUMAN 2 UNITS: 100 INJECTION, SOLUTION PARENTERAL at 18:07

## 2022-09-23 ENCOUNTER — APPOINTMENT (OUTPATIENT)
Dept: GENERAL RADIOLOGY | Facility: HOSPITAL | Age: 72
End: 2022-09-23

## 2022-09-23 LAB
ALBUMIN SERPL-MCNC: 2.9 G/DL (ref 3.5–5.2)
ALBUMIN/GLOB SERPL: 1.1 G/DL
ALP SERPL-CCNC: 71 U/L (ref 39–117)
ALT SERPL W P-5'-P-CCNC: 21 U/L (ref 1–33)
ANION GAP SERPL CALCULATED.3IONS-SCNC: 18 MMOL/L (ref 5–15)
AST SERPL-CCNC: 23 U/L (ref 1–32)
BASOPHILS # BLD AUTO: 0.02 10*3/MM3 (ref 0–0.2)
BASOPHILS NFR BLD AUTO: 0.3 % (ref 0–1.5)
BILIRUB SERPL-MCNC: 0.4 MG/DL (ref 0–1.2)
BUN SERPL-MCNC: 24 MG/DL (ref 8–23)
BUN/CREAT SERPL: 15 (ref 7–25)
CALCIUM SPEC-SCNC: 8.4 MG/DL (ref 8.6–10.5)
CHLORIDE SERPL-SCNC: 108 MMOL/L (ref 98–107)
CO2 SERPL-SCNC: 15 MMOL/L (ref 22–29)
CREAT SERPL-MCNC: 1.6 MG/DL (ref 0.57–1)
CRP SERPL-MCNC: 6.28 MG/DL (ref 0–0.5)
DEPRECATED RDW RBC AUTO: 44.4 FL (ref 37–54)
EGFRCR SERPLBLD CKD-EPI 2021: 34.1 ML/MIN/1.73
EOSINOPHIL # BLD AUTO: 0.08 10*3/MM3 (ref 0–0.4)
EOSINOPHIL NFR BLD AUTO: 1 % (ref 0.3–6.2)
ERYTHROCYTE [DISTWIDTH] IN BLOOD BY AUTOMATED COUNT: 13.1 % (ref 12.3–15.4)
GLOBULIN UR ELPH-MCNC: 2.7 GM/DL
GLUCOSE BLDC GLUCOMTR-MCNC: 127 MG/DL (ref 70–130)
GLUCOSE BLDC GLUCOMTR-MCNC: 176 MG/DL (ref 70–130)
GLUCOSE BLDC GLUCOMTR-MCNC: 198 MG/DL (ref 70–130)
GLUCOSE BLDC GLUCOMTR-MCNC: 232 MG/DL (ref 70–130)
GLUCOSE SERPL-MCNC: 158 MG/DL (ref 65–99)
HCT VFR BLD AUTO: 34.7 % (ref 34–46.6)
HGB BLD-MCNC: 11 G/DL (ref 12–15.9)
IMM GRANULOCYTES # BLD AUTO: 0.02 10*3/MM3 (ref 0–0.05)
IMM GRANULOCYTES NFR BLD AUTO: 0.3 % (ref 0–0.5)
LYMPHOCYTES # BLD AUTO: 1.35 10*3/MM3 (ref 0.7–3.1)
LYMPHOCYTES NFR BLD AUTO: 17.2 % (ref 19.6–45.3)
MAGNESIUM SERPL-MCNC: 2 MG/DL (ref 1.6–2.4)
MCH RBC QN AUTO: 29.3 PG (ref 26.6–33)
MCHC RBC AUTO-ENTMCNC: 31.7 G/DL (ref 31.5–35.7)
MCV RBC AUTO: 92.5 FL (ref 79–97)
MONOCYTES # BLD AUTO: 1.24 10*3/MM3 (ref 0.1–0.9)
MONOCYTES NFR BLD AUTO: 15.8 % (ref 5–12)
NEUTROPHILS NFR BLD AUTO: 5.15 10*3/MM3 (ref 1.7–7)
NEUTROPHILS NFR BLD AUTO: 65.4 % (ref 42.7–76)
NRBC BLD AUTO-RTO: 0 /100 WBC (ref 0–0.2)
PHOSPHATE SERPL-MCNC: 3.2 MG/DL (ref 2.5–4.5)
PLATELET # BLD AUTO: 154 10*3/MM3 (ref 140–450)
PMV BLD AUTO: 10.1 FL (ref 6–12)
POTASSIUM SERPL-SCNC: 4.6 MMOL/L (ref 3.5–5.2)
PREALB SERPL-MCNC: 10.1 MG/DL (ref 20–40)
PROT SERPL-MCNC: 5.6 G/DL (ref 6–8.5)
RBC # BLD AUTO: 3.75 10*6/MM3 (ref 3.77–5.28)
SODIUM SERPL-SCNC: 141 MMOL/L (ref 136–145)
TROPONIN T SERPL-MCNC: 0.19 NG/ML (ref 0–0.03)
WBC NRBC COR # BLD: 7.86 10*3/MM3 (ref 3.4–10.8)

## 2022-09-23 PROCEDURE — 74018 RADEX ABDOMEN 1 VIEW: CPT

## 2022-09-23 PROCEDURE — 99233 SBSQ HOSP IP/OBS HIGH 50: CPT | Performed by: STUDENT IN AN ORGANIZED HEALTH CARE EDUCATION/TRAINING PROGRAM

## 2022-09-23 PROCEDURE — 93005 ELECTROCARDIOGRAM TRACING: CPT | Performed by: INTERNAL MEDICINE

## 2022-09-23 PROCEDURE — 84484 ASSAY OF TROPONIN QUANT: CPT | Performed by: INTERNAL MEDICINE

## 2022-09-23 PROCEDURE — 80053 COMPREHEN METABOLIC PANEL: CPT | Performed by: INTERNAL MEDICINE

## 2022-09-23 PROCEDURE — 83735 ASSAY OF MAGNESIUM: CPT | Performed by: INTERNAL MEDICINE

## 2022-09-23 PROCEDURE — 99222 1ST HOSP IP/OBS MODERATE 55: CPT | Performed by: INTERNAL MEDICINE

## 2022-09-23 PROCEDURE — 93010 ELECTROCARDIOGRAM REPORT: CPT | Performed by: INTERNAL MEDICINE

## 2022-09-23 PROCEDURE — 86140 C-REACTIVE PROTEIN: CPT

## 2022-09-23 PROCEDURE — 84100 ASSAY OF PHOSPHORUS: CPT | Performed by: INTERNAL MEDICINE

## 2022-09-23 PROCEDURE — 99232 SBSQ HOSP IP/OBS MODERATE 35: CPT | Performed by: INTERNAL MEDICINE

## 2022-09-23 PROCEDURE — 85025 COMPLETE CBC W/AUTO DIFF WBC: CPT | Performed by: INTERNAL MEDICINE

## 2022-09-23 PROCEDURE — 82962 GLUCOSE BLOOD TEST: CPT

## 2022-09-23 PROCEDURE — 63710000001 INSULIN REGULAR HUMAN PER 5 UNITS: Performed by: INTERNAL MEDICINE

## 2022-09-23 PROCEDURE — 84134 ASSAY OF PREALBUMIN: CPT

## 2022-09-23 RX ORDER — POLYETHYLENE GLYCOL 3350 17 G/17G
17 POWDER, FOR SOLUTION ORAL DAILY PRN
Status: DISCONTINUED | OUTPATIENT
Start: 2022-09-23 | End: 2022-10-01

## 2022-09-23 RX ORDER — CARVEDILOL 3.12 MG/1
3.12 TABLET ORAL 2 TIMES DAILY WITH MEALS
Status: DISCONTINUED | OUTPATIENT
Start: 2022-09-23 | End: 2022-09-24

## 2022-09-23 RX ORDER — CLOPIDOGREL BISULFATE 75 MG/1
75 TABLET ORAL EVERY 24 HOURS
Status: DISCONTINUED | OUTPATIENT
Start: 2022-09-23 | End: 2022-09-23

## 2022-09-23 RX ORDER — OXYBUTYNIN CHLORIDE 5 MG/1
2.5 TABLET ORAL 2 TIMES DAILY
Status: DISCONTINUED | OUTPATIENT
Start: 2022-09-23 | End: 2022-10-01

## 2022-09-23 RX ORDER — CLOPIDOGREL BISULFATE 75 MG/1
75 TABLET ORAL EVERY 24 HOURS
Status: DISCONTINUED | OUTPATIENT
Start: 2022-09-23 | End: 2022-10-01

## 2022-09-23 RX ORDER — DOCUSATE SODIUM 50 MG/5 ML
100 LIQUID (ML) ORAL 2 TIMES DAILY
Status: DISCONTINUED | OUTPATIENT
Start: 2022-09-23 | End: 2022-10-01

## 2022-09-23 RX ORDER — CARVEDILOL 3.12 MG/1
3.12 TABLET ORAL 2 TIMES DAILY WITH MEALS
Status: CANCELLED | OUTPATIENT
Start: 2022-09-23

## 2022-09-23 RX ORDER — ATORVASTATIN CALCIUM 40 MG/1
80 TABLET, FILM COATED ORAL NIGHTLY
Status: DISCONTINUED | OUTPATIENT
Start: 2022-09-23 | End: 2022-10-01

## 2022-09-23 RX ORDER — BUPROPION HYDROCHLORIDE 75 MG/1
150 TABLET ORAL EVERY 12 HOURS SCHEDULED
Status: DISCONTINUED | OUTPATIENT
Start: 2022-09-23 | End: 2022-10-01

## 2022-09-23 RX ORDER — CLOPIDOGREL BISULFATE 75 MG/1
75 TABLET ORAL DAILY
Status: DISCONTINUED | OUTPATIENT
Start: 2022-09-23 | End: 2022-09-23

## 2022-09-23 RX ORDER — CARVEDILOL 3.12 MG/1
3.12 TABLET ORAL 2 TIMES DAILY WITH MEALS
Status: DISCONTINUED | OUTPATIENT
Start: 2022-09-23 | End: 2022-09-23

## 2022-09-23 RX ADMIN — OXYBUTYNIN CHLORIDE 2.5 MG: 5 TABLET ORAL at 03:07

## 2022-09-23 RX ADMIN — OXYBUTYNIN CHLORIDE 2.5 MG: 5 TABLET ORAL at 09:05

## 2022-09-23 RX ADMIN — Medication 10 ML: at 08:59

## 2022-09-23 RX ADMIN — ASPIRIN 325 MG ORAL TABLET 325 MG: 325 PILL ORAL at 08:59

## 2022-09-23 RX ADMIN — INSULIN HUMAN 3 UNITS: 100 INJECTION, SOLUTION PARENTERAL at 13:02

## 2022-09-23 RX ADMIN — CLOPIDOGREL BISULFATE 75 MG: 75 TABLET ORAL at 20:44

## 2022-09-23 RX ADMIN — ATORVASTATIN CALCIUM 80 MG: 40 TABLET, FILM COATED ORAL at 03:08

## 2022-09-23 RX ADMIN — BUPROPION HYDROCHLORIDE 150 MG: 75 TABLET, FILM COATED ORAL at 20:45

## 2022-09-23 RX ADMIN — ATORVASTATIN CALCIUM 80 MG: 40 TABLET, FILM COATED ORAL at 20:45

## 2022-09-23 RX ADMIN — DOCUSATE SODIUM 100 MG: 50 LIQUID ORAL at 20:45

## 2022-09-23 RX ADMIN — INSULIN HUMAN 2 UNITS: 100 INJECTION, SOLUTION PARENTERAL at 18:31

## 2022-09-23 RX ADMIN — FAMOTIDINE 20 MG: 10 INJECTION, SOLUTION INTRAVENOUS at 20:45

## 2022-09-23 RX ADMIN — CARVEDILOL 3.12 MG: 3.12 TABLET, FILM COATED ORAL at 10:34

## 2022-09-23 RX ADMIN — DOCUSATE SODIUM 100 MG: 50 LIQUID ORAL at 10:34

## 2022-09-23 RX ADMIN — CARVEDILOL 3.12 MG: 3.12 TABLET, FILM COATED ORAL at 18:31

## 2022-09-23 RX ADMIN — OXYBUTYNIN CHLORIDE 2.5 MG: 5 TABLET ORAL at 20:45

## 2022-09-23 RX ADMIN — BUPROPION HYDROCHLORIDE 150 MG: 75 TABLET, FILM COATED ORAL at 03:08

## 2022-09-24 LAB
ALBUMIN SERPL-MCNC: 2.9 G/DL (ref 3.5–5.2)
ALBUMIN/GLOB SERPL: 1.4 G/DL
ALP SERPL-CCNC: 73 U/L (ref 39–117)
ALT SERPL W P-5'-P-CCNC: 18 U/L (ref 1–33)
ANION GAP SERPL CALCULATED.3IONS-SCNC: 12 MMOL/L (ref 5–15)
AST SERPL-CCNC: 24 U/L (ref 1–32)
BASOPHILS # BLD AUTO: 0.02 10*3/MM3 (ref 0–0.2)
BASOPHILS NFR BLD AUTO: 0.2 % (ref 0–1.5)
BILIRUB SERPL-MCNC: 0.3 MG/DL (ref 0–1.2)
BUN SERPL-MCNC: 24 MG/DL (ref 8–23)
BUN/CREAT SERPL: 17.3 (ref 7–25)
CA-I SERPL ISE-MCNC: 1.28 MMOL/L (ref 1.12–1.32)
CALCIUM SPEC-SCNC: 8.3 MG/DL (ref 8.6–10.5)
CHLORIDE SERPL-SCNC: 113 MMOL/L (ref 98–107)
CO2 SERPL-SCNC: 19 MMOL/L (ref 22–29)
CREAT SERPL-MCNC: 1.39 MG/DL (ref 0.57–1)
DEPRECATED RDW RBC AUTO: 42.5 FL (ref 37–54)
EGFRCR SERPLBLD CKD-EPI 2021: 40.4 ML/MIN/1.73
EOSINOPHIL # BLD AUTO: 0.12 10*3/MM3 (ref 0–0.4)
EOSINOPHIL NFR BLD AUTO: 1.4 % (ref 0.3–6.2)
ERYTHROCYTE [DISTWIDTH] IN BLOOD BY AUTOMATED COUNT: 13.2 % (ref 12.3–15.4)
GLOBULIN UR ELPH-MCNC: 2.1 GM/DL
GLUCOSE BLDC GLUCOMTR-MCNC: 226 MG/DL (ref 70–130)
GLUCOSE BLDC GLUCOMTR-MCNC: 228 MG/DL (ref 70–130)
GLUCOSE BLDC GLUCOMTR-MCNC: 235 MG/DL (ref 70–130)
GLUCOSE BLDC GLUCOMTR-MCNC: 274 MG/DL (ref 70–130)
GLUCOSE SERPL-MCNC: 190 MG/DL (ref 65–99)
HCT VFR BLD AUTO: 30.4 % (ref 34–46.6)
HGB BLD-MCNC: 10.1 G/DL (ref 12–15.9)
IMM GRANULOCYTES # BLD AUTO: 0.02 10*3/MM3 (ref 0–0.05)
IMM GRANULOCYTES NFR BLD AUTO: 0.2 % (ref 0–0.5)
LYMPHOCYTES # BLD AUTO: 1.23 10*3/MM3 (ref 0.7–3.1)
LYMPHOCYTES NFR BLD AUTO: 14.5 % (ref 19.6–45.3)
MAGNESIUM SERPL-MCNC: 1.7 MG/DL (ref 1.6–2.4)
MCH RBC QN AUTO: 29.5 PG (ref 26.6–33)
MCHC RBC AUTO-ENTMCNC: 33.2 G/DL (ref 31.5–35.7)
MCV RBC AUTO: 88.9 FL (ref 79–97)
MONOCYTES # BLD AUTO: 1.32 10*3/MM3 (ref 0.1–0.9)
MONOCYTES NFR BLD AUTO: 15.5 % (ref 5–12)
NEUTROPHILS NFR BLD AUTO: 5.78 10*3/MM3 (ref 1.7–7)
NEUTROPHILS NFR BLD AUTO: 68.2 % (ref 42.7–76)
NRBC BLD AUTO-RTO: 0 /100 WBC (ref 0–0.2)
PHOSPHATE SERPL-MCNC: 2 MG/DL (ref 2.5–4.5)
PHOSPHATE SERPL-MCNC: 2 MG/DL (ref 2.5–4.5)
PHOSPHATE SERPL-MCNC: 2.4 MG/DL (ref 2.5–4.5)
PLATELET # BLD AUTO: 162 10*3/MM3 (ref 140–450)
PMV BLD AUTO: 10 FL (ref 6–12)
POTASSIUM SERPL-SCNC: 4 MMOL/L (ref 3.5–5.2)
PROT SERPL-MCNC: 5 G/DL (ref 6–8.5)
RBC # BLD AUTO: 3.42 10*6/MM3 (ref 3.77–5.28)
SODIUM SERPL-SCNC: 144 MMOL/L (ref 136–145)
WBC NRBC COR # BLD: 8.49 10*3/MM3 (ref 3.4–10.8)

## 2022-09-24 PROCEDURE — 97535 SELF CARE MNGMENT TRAINING: CPT

## 2022-09-24 PROCEDURE — 99233 SBSQ HOSP IP/OBS HIGH 50: CPT | Performed by: STUDENT IN AN ORGANIZED HEALTH CARE EDUCATION/TRAINING PROGRAM

## 2022-09-24 PROCEDURE — 82962 GLUCOSE BLOOD TEST: CPT

## 2022-09-24 PROCEDURE — 84100 ASSAY OF PHOSPHORUS: CPT | Performed by: INTERNAL MEDICINE

## 2022-09-24 PROCEDURE — 99232 SBSQ HOSP IP/OBS MODERATE 35: CPT | Performed by: INTERNAL MEDICINE

## 2022-09-24 PROCEDURE — 85025 COMPLETE CBC W/AUTO DIFF WBC: CPT | Performed by: INTERNAL MEDICINE

## 2022-09-24 PROCEDURE — 80053 COMPREHEN METABOLIC PANEL: CPT | Performed by: INTERNAL MEDICINE

## 2022-09-24 PROCEDURE — 97530 THERAPEUTIC ACTIVITIES: CPT

## 2022-09-24 PROCEDURE — 63710000001 INSULIN REGULAR HUMAN PER 5 UNITS: Performed by: INTERNAL MEDICINE

## 2022-09-24 PROCEDURE — 0 MAGNESIUM SULFATE 4 GM/100ML SOLUTION: Performed by: NURSE PRACTITIONER

## 2022-09-24 PROCEDURE — 82330 ASSAY OF CALCIUM: CPT | Performed by: INTERNAL MEDICINE

## 2022-09-24 PROCEDURE — 83735 ASSAY OF MAGNESIUM: CPT | Performed by: INTERNAL MEDICINE

## 2022-09-24 RX ORDER — CARVEDILOL 6.25 MG/1
6.25 TABLET ORAL 2 TIMES DAILY WITH MEALS
Status: DISCONTINUED | OUTPATIENT
Start: 2022-09-24 | End: 2022-09-26

## 2022-09-24 RX ORDER — MAGNESIUM SULFATE HEPTAHYDRATE 40 MG/ML
4 INJECTION, SOLUTION INTRAVENOUS ONCE
Status: COMPLETED | OUTPATIENT
Start: 2022-09-24 | End: 2022-09-24

## 2022-09-24 RX ORDER — SPIRONOLACTONE 25 MG/1
25 TABLET ORAL DAILY
Status: DISCONTINUED | OUTPATIENT
Start: 2022-09-24 | End: 2022-09-26

## 2022-09-24 RX ADMIN — OXYBUTYNIN CHLORIDE 2.5 MG: 5 TABLET ORAL at 09:09

## 2022-09-24 RX ADMIN — ASPIRIN 325 MG ORAL TABLET 325 MG: 325 PILL ORAL at 09:09

## 2022-09-24 RX ADMIN — CARVEDILOL 6.25 MG: 6.25 TABLET, FILM COATED ORAL at 09:09

## 2022-09-24 RX ADMIN — BUPROPION HYDROCHLORIDE 150 MG: 75 TABLET, FILM COATED ORAL at 20:20

## 2022-09-24 RX ADMIN — CARVEDILOL 6.25 MG: 6.25 TABLET, FILM COATED ORAL at 18:53

## 2022-09-24 RX ADMIN — POTASSIUM & SODIUM PHOSPHATES POWDER PACK 280-160-250 MG 2 PACKET: 280-160-250 PACK at 06:20

## 2022-09-24 RX ADMIN — INSULIN HUMAN 2 UNITS: 100 INJECTION, SOLUTION PARENTERAL at 00:25

## 2022-09-24 RX ADMIN — MAGNESIUM SULFATE HEPTAHYDRATE 4 G: 40 INJECTION, SOLUTION INTRAVENOUS at 06:20

## 2022-09-24 RX ADMIN — DOCUSATE SODIUM 100 MG: 50 LIQUID ORAL at 20:21

## 2022-09-24 RX ADMIN — POTASSIUM & SODIUM PHOSPHATES POWDER PACK 280-160-250 MG 2 PACKET: 280-160-250 PACK at 23:08

## 2022-09-24 RX ADMIN — FAMOTIDINE 20 MG: 10 INJECTION, SOLUTION INTRAVENOUS at 20:21

## 2022-09-24 RX ADMIN — INSULIN HUMAN 3 UNITS: 100 INJECTION, SOLUTION PARENTERAL at 06:20

## 2022-09-24 RX ADMIN — INSULIN HUMAN 3 UNITS: 100 INJECTION, SOLUTION PARENTERAL at 18:53

## 2022-09-24 RX ADMIN — INSULIN HUMAN 4 UNITS: 100 INJECTION, SOLUTION PARENTERAL at 11:58

## 2022-09-24 RX ADMIN — POTASSIUM & SODIUM PHOSPHATES POWDER PACK 280-160-250 MG 2 PACKET: 280-160-250 PACK at 18:54

## 2022-09-24 RX ADMIN — POTASSIUM & SODIUM PHOSPHATES POWDER PACK 280-160-250 MG 2 PACKET: 280-160-250 PACK at 13:42

## 2022-09-24 RX ADMIN — CLOPIDOGREL BISULFATE 75 MG: 75 TABLET ORAL at 20:20

## 2022-09-24 RX ADMIN — SPIRONOLACTONE 25 MG: 25 TABLET ORAL at 09:09

## 2022-09-24 RX ADMIN — OXYBUTYNIN CHLORIDE 2.5 MG: 5 TABLET ORAL at 20:20

## 2022-09-24 RX ADMIN — Medication 10 ML: at 20:21

## 2022-09-24 RX ADMIN — DOCUSATE SODIUM 100 MG: 50 LIQUID ORAL at 09:09

## 2022-09-24 RX ADMIN — Medication 10 ML: at 09:09

## 2022-09-24 RX ADMIN — ATORVASTATIN CALCIUM 80 MG: 40 TABLET, FILM COATED ORAL at 20:20

## 2022-09-25 LAB
GLUCOSE BLDC GLUCOMTR-MCNC: 229 MG/DL (ref 70–130)
GLUCOSE BLDC GLUCOMTR-MCNC: 253 MG/DL (ref 70–130)
GLUCOSE BLDC GLUCOMTR-MCNC: 262 MG/DL (ref 70–130)
GLUCOSE BLDC GLUCOMTR-MCNC: 292 MG/DL (ref 70–130)
QT INTERVAL: 440 MS
QTC INTERVAL: 529 MS

## 2022-09-25 PROCEDURE — 99232 SBSQ HOSP IP/OBS MODERATE 35: CPT | Performed by: NURSE PRACTITIONER

## 2022-09-25 PROCEDURE — 63710000001 INSULIN REGULAR HUMAN PER 5 UNITS: Performed by: INTERNAL MEDICINE

## 2022-09-25 PROCEDURE — 63710000001 INSULIN DETEMIR PER 5 UNITS: Performed by: FAMILY MEDICINE

## 2022-09-25 PROCEDURE — 82962 GLUCOSE BLOOD TEST: CPT

## 2022-09-25 PROCEDURE — 99233 SBSQ HOSP IP/OBS HIGH 50: CPT | Performed by: FAMILY MEDICINE

## 2022-09-25 PROCEDURE — 63710000001 INSULIN REGULAR HUMAN PER 5 UNITS: Performed by: FAMILY MEDICINE

## 2022-09-25 RX ORDER — TRAMADOL HYDROCHLORIDE 50 MG/1
50 TABLET ORAL EVERY 8 HOURS PRN
Status: DISCONTINUED | OUTPATIENT
Start: 2022-09-25 | End: 2022-09-26

## 2022-09-25 RX ADMIN — TRAMADOL HYDROCHLORIDE 50 MG: 50 TABLET ORAL at 08:22

## 2022-09-25 RX ADMIN — INSULIN HUMAN 3 UNITS: 100 INJECTION, SOLUTION PARENTERAL at 07:34

## 2022-09-25 RX ADMIN — INSULIN HUMAN 4 UNITS: 100 INJECTION, SOLUTION PARENTERAL at 01:20

## 2022-09-25 RX ADMIN — INSULIN HUMAN 8 UNITS: 100 INJECTION, SOLUTION PARENTERAL at 12:15

## 2022-09-25 RX ADMIN — FAMOTIDINE 20 MG: 10 INJECTION, SOLUTION INTRAVENOUS at 22:35

## 2022-09-25 RX ADMIN — OXYBUTYNIN CHLORIDE 2.5 MG: 5 TABLET ORAL at 20:46

## 2022-09-25 RX ADMIN — OXYBUTYNIN CHLORIDE 2.5 MG: 5 TABLET ORAL at 08:23

## 2022-09-25 RX ADMIN — ASPIRIN 325 MG ORAL TABLET 325 MG: 325 PILL ORAL at 08:23

## 2022-09-25 RX ADMIN — ATORVASTATIN CALCIUM 80 MG: 40 TABLET, FILM COATED ORAL at 20:52

## 2022-09-25 RX ADMIN — TRAMADOL HYDROCHLORIDE 50 MG: 50 TABLET ORAL at 17:12

## 2022-09-25 RX ADMIN — Medication 10 ML: at 08:23

## 2022-09-25 RX ADMIN — BUPROPION HYDROCHLORIDE 150 MG: 75 TABLET, FILM COATED ORAL at 08:23

## 2022-09-25 RX ADMIN — DOCUSATE SODIUM 100 MG: 50 LIQUID ORAL at 20:46

## 2022-09-25 RX ADMIN — CARVEDILOL 6.25 MG: 6.25 TABLET, FILM COATED ORAL at 17:12

## 2022-09-25 RX ADMIN — INSULIN DETEMIR 5 UNITS: 100 INJECTION, SOLUTION SUBCUTANEOUS at 20:52

## 2022-09-25 RX ADMIN — CARVEDILOL 6.25 MG: 6.25 TABLET, FILM COATED ORAL at 08:22

## 2022-09-25 RX ADMIN — CLOPIDOGREL BISULFATE 75 MG: 75 TABLET ORAL at 20:47

## 2022-09-25 RX ADMIN — DOCUSATE SODIUM 100 MG: 50 LIQUID ORAL at 08:22

## 2022-09-25 RX ADMIN — BUPROPION HYDROCHLORIDE 150 MG: 75 TABLET, FILM COATED ORAL at 20:47

## 2022-09-25 RX ADMIN — INSULIN HUMAN 8 UNITS: 100 INJECTION, SOLUTION PARENTERAL at 18:17

## 2022-09-25 RX ADMIN — SPIRONOLACTONE 25 MG: 25 TABLET ORAL at 08:22

## 2022-09-25 RX ADMIN — Medication 10 ML: at 20:52

## 2022-09-26 LAB
GLUCOSE BLDC GLUCOMTR-MCNC: 187 MG/DL (ref 70–130)
GLUCOSE BLDC GLUCOMTR-MCNC: 201 MG/DL (ref 70–130)
GLUCOSE BLDC GLUCOMTR-MCNC: 208 MG/DL (ref 70–130)
GLUCOSE BLDC GLUCOMTR-MCNC: 234 MG/DL (ref 70–130)
GLUCOSE BLDC GLUCOMTR-MCNC: 252 MG/DL (ref 70–130)

## 2022-09-26 PROCEDURE — 99232 SBSQ HOSP IP/OBS MODERATE 35: CPT | Performed by: FAMILY MEDICINE

## 2022-09-26 PROCEDURE — 63710000001 INSULIN REGULAR HUMAN PER 5 UNITS: Performed by: FAMILY MEDICINE

## 2022-09-26 PROCEDURE — 63710000001 INSULIN DETEMIR PER 5 UNITS: Performed by: FAMILY MEDICINE

## 2022-09-26 PROCEDURE — 97110 THERAPEUTIC EXERCISES: CPT

## 2022-09-26 PROCEDURE — 97535 SELF CARE MNGMENT TRAINING: CPT

## 2022-09-26 PROCEDURE — 92526 ORAL FUNCTION THERAPY: CPT

## 2022-09-26 PROCEDURE — 92507 TX SP LANG VOICE COMM INDIV: CPT

## 2022-09-26 PROCEDURE — 82962 GLUCOSE BLOOD TEST: CPT

## 2022-09-26 PROCEDURE — 99231 SBSQ HOSP IP/OBS SF/LOW 25: CPT | Performed by: INTERNAL MEDICINE

## 2022-09-26 RX ORDER — FAMOTIDINE 20 MG/1
20 TABLET, FILM COATED ORAL NIGHTLY
Status: DISCONTINUED | OUTPATIENT
Start: 2022-09-26 | End: 2022-10-01

## 2022-09-26 RX ORDER — SPIRONOLACTONE 25 MG/1
25 TABLET ORAL DAILY
Status: DISCONTINUED | OUTPATIENT
Start: 2022-09-27 | End: 2022-10-01

## 2022-09-26 RX ORDER — TRAMADOL HYDROCHLORIDE 50 MG/1
50 TABLET ORAL EVERY 8 HOURS PRN
Status: DISCONTINUED | OUTPATIENT
Start: 2022-09-26 | End: 2022-10-01

## 2022-09-26 RX ORDER — CARVEDILOL 3.12 MG/1
3.12 TABLET ORAL 2 TIMES DAILY WITH MEALS
Status: DISCONTINUED | OUTPATIENT
Start: 2022-09-26 | End: 2022-10-01

## 2022-09-26 RX ADMIN — FAMOTIDINE 20 MG: 20 TABLET ORAL at 21:33

## 2022-09-26 RX ADMIN — ASPIRIN 325 MG ORAL TABLET 325 MG: 325 PILL ORAL at 08:52

## 2022-09-26 RX ADMIN — ATORVASTATIN CALCIUM 80 MG: 40 TABLET, FILM COATED ORAL at 21:32

## 2022-09-26 RX ADMIN — SPIRONOLACTONE 25 MG: 25 TABLET ORAL at 08:52

## 2022-09-26 RX ADMIN — INSULIN HUMAN 5 UNITS: 100 INJECTION, SOLUTION PARENTERAL at 06:53

## 2022-09-26 RX ADMIN — INSULIN HUMAN 5 UNITS: 100 INJECTION, SOLUTION PARENTERAL at 00:44

## 2022-09-26 RX ADMIN — BUPROPION HYDROCHLORIDE 150 MG: 75 TABLET, FILM COATED ORAL at 21:33

## 2022-09-26 RX ADMIN — INSULIN HUMAN 8 UNITS: 100 INJECTION, SOLUTION PARENTERAL at 12:16

## 2022-09-26 RX ADMIN — OXYBUTYNIN CHLORIDE 2.5 MG: 5 TABLET ORAL at 21:32

## 2022-09-26 RX ADMIN — CARVEDILOL 6.25 MG: 6.25 TABLET, FILM COATED ORAL at 08:52

## 2022-09-26 RX ADMIN — INSULIN DETEMIR 10 UNITS: 100 INJECTION, SOLUTION SUBCUTANEOUS at 21:35

## 2022-09-26 RX ADMIN — CARVEDILOL 3.12 MG: 3.12 TABLET, FILM COATED ORAL at 18:00

## 2022-09-26 RX ADMIN — DOCUSATE SODIUM 100 MG: 50 LIQUID ORAL at 08:51

## 2022-09-26 RX ADMIN — INSULIN HUMAN 3 UNITS: 100 INJECTION, SOLUTION PARENTERAL at 18:00

## 2022-09-26 RX ADMIN — TRAMADOL HYDROCHLORIDE 50 MG: 50 TABLET ORAL at 06:18

## 2022-09-26 RX ADMIN — TRAMADOL HYDROCHLORIDE 50 MG: 50 TABLET ORAL at 14:10

## 2022-09-26 RX ADMIN — BUPROPION HYDROCHLORIDE 150 MG: 75 TABLET, FILM COATED ORAL at 08:52

## 2022-09-26 RX ADMIN — DOCUSATE SODIUM 100 MG: 50 LIQUID ORAL at 21:35

## 2022-09-26 RX ADMIN — OXYBUTYNIN CHLORIDE 2.5 MG: 5 TABLET ORAL at 08:52

## 2022-09-26 RX ADMIN — CLOPIDOGREL BISULFATE 75 MG: 75 TABLET ORAL at 21:33

## 2022-09-27 ENCOUNTER — APPOINTMENT (OUTPATIENT)
Dept: GENERAL RADIOLOGY | Facility: HOSPITAL | Age: 72
End: 2022-09-27

## 2022-09-27 LAB
ANION GAP SERPL CALCULATED.3IONS-SCNC: 9 MMOL/L (ref 5–15)
BUN SERPL-MCNC: 34 MG/DL (ref 8–23)
BUN/CREAT SERPL: 25.2 (ref 7–25)
CALCIUM SPEC-SCNC: 8.5 MG/DL (ref 8.6–10.5)
CHLORIDE SERPL-SCNC: 104 MMOL/L (ref 98–107)
CO2 SERPL-SCNC: 26 MMOL/L (ref 22–29)
CREAT SERPL-MCNC: 1.35 MG/DL (ref 0.57–1)
EGFRCR SERPLBLD CKD-EPI 2021: 41.8 ML/MIN/1.73
GLUCOSE BLDC GLUCOMTR-MCNC: 102 MG/DL (ref 70–130)
GLUCOSE BLDC GLUCOMTR-MCNC: 220 MG/DL (ref 70–130)
GLUCOSE BLDC GLUCOMTR-MCNC: 81 MG/DL (ref 70–130)
GLUCOSE BLDC GLUCOMTR-MCNC: 91 MG/DL (ref 70–130)
GLUCOSE BLDC GLUCOMTR-MCNC: 91 MG/DL (ref 70–130)
GLUCOSE BLDC GLUCOMTR-MCNC: 92 MG/DL (ref 70–130)
GLUCOSE BLDC GLUCOMTR-MCNC: 97 MG/DL (ref 70–130)
GLUCOSE SERPL-MCNC: 115 MG/DL (ref 65–99)
POTASSIUM SERPL-SCNC: 4.7 MMOL/L (ref 3.5–5.2)
SODIUM SERPL-SCNC: 139 MMOL/L (ref 136–145)

## 2022-09-27 PROCEDURE — 97535 SELF CARE MNGMENT TRAINING: CPT

## 2022-09-27 PROCEDURE — 74018 RADEX ABDOMEN 1 VIEW: CPT

## 2022-09-27 PROCEDURE — 97530 THERAPEUTIC ACTIVITIES: CPT

## 2022-09-27 PROCEDURE — 97110 THERAPEUTIC EXERCISES: CPT

## 2022-09-27 PROCEDURE — 93010 ELECTROCARDIOGRAM REPORT: CPT | Performed by: INTERNAL MEDICINE

## 2022-09-27 PROCEDURE — 93005 ELECTROCARDIOGRAM TRACING: CPT | Performed by: INTERNAL MEDICINE

## 2022-09-27 PROCEDURE — C1769 GUIDE WIRE: HCPCS | Performed by: INTERNAL MEDICINE

## 2022-09-27 PROCEDURE — 82962 GLUCOSE BLOOD TEST: CPT

## 2022-09-27 PROCEDURE — 4A023N7 MEASUREMENT OF CARDIAC SAMPLING AND PRESSURE, LEFT HEART, PERCUTANEOUS APPROACH: ICD-10-PCS | Performed by: INTERNAL MEDICINE

## 2022-09-27 PROCEDURE — B2111ZZ FLUOROSCOPY OF MULTIPLE CORONARY ARTERIES USING LOW OSMOLAR CONTRAST: ICD-10-PCS | Performed by: INTERNAL MEDICINE

## 2022-09-27 PROCEDURE — C1894 INTRO/SHEATH, NON-LASER: HCPCS | Performed by: INTERNAL MEDICINE

## 2022-09-27 PROCEDURE — 0 IOPAMIDOL PER 1 ML: Performed by: INTERNAL MEDICINE

## 2022-09-27 PROCEDURE — 63710000001 INSULIN REGULAR HUMAN PER 5 UNITS: Performed by: FAMILY MEDICINE

## 2022-09-27 PROCEDURE — 25010000002 FENTANYL CITRATE (PF) 50 MCG/ML SOLUTION: Performed by: INTERNAL MEDICINE

## 2022-09-27 PROCEDURE — 93458 L HRT ARTERY/VENTRICLE ANGIO: CPT | Performed by: INTERNAL MEDICINE

## 2022-09-27 PROCEDURE — 99232 SBSQ HOSP IP/OBS MODERATE 35: CPT | Performed by: FAMILY MEDICINE

## 2022-09-27 PROCEDURE — 25010000002 MIDAZOLAM PER 1 MG: Performed by: INTERNAL MEDICINE

## 2022-09-27 PROCEDURE — 80048 BASIC METABOLIC PNL TOTAL CA: CPT | Performed by: INTERNAL MEDICINE

## 2022-09-27 PROCEDURE — C1760 CLOSURE DEV, VASC: HCPCS | Performed by: INTERNAL MEDICINE

## 2022-09-27 PROCEDURE — B2151ZZ FLUOROSCOPY OF LEFT HEART USING LOW OSMOLAR CONTRAST: ICD-10-PCS | Performed by: INTERNAL MEDICINE

## 2022-09-27 RX ORDER — FENTANYL CITRATE 50 UG/ML
INJECTION, SOLUTION INTRAMUSCULAR; INTRAVENOUS AS NEEDED
Status: DISCONTINUED | OUTPATIENT
Start: 2022-09-27 | End: 2022-09-27 | Stop reason: HOSPADM

## 2022-09-27 RX ORDER — SODIUM CHLORIDE 9 MG/ML
75 INJECTION, SOLUTION INTRAVENOUS CONTINUOUS
Status: ACTIVE | OUTPATIENT
Start: 2022-09-27 | End: 2022-09-27

## 2022-09-27 RX ORDER — MIDAZOLAM HYDROCHLORIDE 1 MG/ML
INJECTION INTRAMUSCULAR; INTRAVENOUS AS NEEDED
Status: DISCONTINUED | OUTPATIENT
Start: 2022-09-27 | End: 2022-09-27 | Stop reason: HOSPADM

## 2022-09-27 RX ORDER — LIDOCAINE HYDROCHLORIDE 10 MG/ML
INJECTION, SOLUTION EPIDURAL; INFILTRATION; INTRACAUDAL; PERINEURAL AS NEEDED
Status: DISCONTINUED | OUTPATIENT
Start: 2022-09-27 | End: 2022-09-27 | Stop reason: HOSPADM

## 2022-09-27 RX ADMIN — SODIUM CHLORIDE 75 ML/HR: 9 INJECTION, SOLUTION INTRAVENOUS at 09:01

## 2022-09-27 RX ADMIN — SPIRONOLACTONE 25 MG: 25 TABLET ORAL at 08:39

## 2022-09-27 RX ADMIN — INSULIN HUMAN 5 UNITS: 100 INJECTION, SOLUTION PARENTERAL at 00:13

## 2022-09-27 RX ADMIN — DOCUSATE SODIUM 100 MG: 50 LIQUID ORAL at 08:36

## 2022-09-27 RX ADMIN — BUPROPION HYDROCHLORIDE 150 MG: 75 TABLET, FILM COATED ORAL at 08:40

## 2022-09-27 RX ADMIN — Medication 10 ML: at 00:14

## 2022-09-27 RX ADMIN — OXYBUTYNIN CHLORIDE 2.5 MG: 5 TABLET ORAL at 08:37

## 2022-09-27 RX ADMIN — ASPIRIN 325 MG ORAL TABLET 325 MG: 325 PILL ORAL at 08:36

## 2022-09-27 RX ADMIN — Medication 10 ML: at 08:36

## 2022-09-27 RX ADMIN — CARVEDILOL 3.12 MG: 3.12 TABLET, FILM COATED ORAL at 08:39

## 2022-09-28 LAB
ANION GAP SERPL CALCULATED.3IONS-SCNC: 12 MMOL/L (ref 5–15)
BUN SERPL-MCNC: 29 MG/DL (ref 8–23)
BUN/CREAT SERPL: 21.3 (ref 7–25)
CALCIUM SPEC-SCNC: 8.4 MG/DL (ref 8.6–10.5)
CHLORIDE SERPL-SCNC: 103 MMOL/L (ref 98–107)
CO2 SERPL-SCNC: 26 MMOL/L (ref 22–29)
CREAT SERPL-MCNC: 1.36 MG/DL (ref 0.57–1)
DEPRECATED RDW RBC AUTO: 42.5 FL (ref 37–54)
EGFRCR SERPLBLD CKD-EPI 2021: 41.5 ML/MIN/1.73
ERYTHROCYTE [DISTWIDTH] IN BLOOD BY AUTOMATED COUNT: 13.1 % (ref 12.3–15.4)
GLUCOSE BLDC GLUCOMTR-MCNC: 114 MG/DL (ref 70–130)
GLUCOSE BLDC GLUCOMTR-MCNC: 187 MG/DL (ref 70–130)
GLUCOSE BLDC GLUCOMTR-MCNC: 249 MG/DL (ref 70–130)
GLUCOSE BLDC GLUCOMTR-MCNC: 83 MG/DL (ref 70–130)
GLUCOSE BLDC GLUCOMTR-MCNC: 95 MG/DL (ref 70–130)
GLUCOSE SERPL-MCNC: 108 MG/DL (ref 65–99)
HCT VFR BLD AUTO: 31.7 % (ref 34–46.6)
HGB BLD-MCNC: 10.6 G/DL (ref 12–15.9)
MCH RBC QN AUTO: 29.9 PG (ref 26.6–33)
MCHC RBC AUTO-ENTMCNC: 33.4 G/DL (ref 31.5–35.7)
MCV RBC AUTO: 89.3 FL (ref 79–97)
PLATELET # BLD AUTO: 211 10*3/MM3 (ref 140–450)
PMV BLD AUTO: 10.1 FL (ref 6–12)
POTASSIUM SERPL-SCNC: 5.2 MMOL/L (ref 3.5–5.2)
RBC # BLD AUTO: 3.55 10*6/MM3 (ref 3.77–5.28)
SODIUM SERPL-SCNC: 141 MMOL/L (ref 136–145)
WBC NRBC COR # BLD: 6.76 10*3/MM3 (ref 3.4–10.8)

## 2022-09-28 PROCEDURE — 63710000001 INSULIN REGULAR HUMAN PER 5 UNITS: Performed by: INTERNAL MEDICINE

## 2022-09-28 PROCEDURE — 99232 SBSQ HOSP IP/OBS MODERATE 35: CPT | Performed by: INTERNAL MEDICINE

## 2022-09-28 PROCEDURE — 82962 GLUCOSE BLOOD TEST: CPT

## 2022-09-28 PROCEDURE — 85027 COMPLETE CBC AUTOMATED: CPT | Performed by: INTERNAL MEDICINE

## 2022-09-28 PROCEDURE — 92610 EVALUATE SWALLOWING FUNCTION: CPT

## 2022-09-28 PROCEDURE — 80048 BASIC METABOLIC PNL TOTAL CA: CPT | Performed by: INTERNAL MEDICINE

## 2022-09-28 PROCEDURE — 63710000001 INSULIN DETEMIR PER 5 UNITS: Performed by: INTERNAL MEDICINE

## 2022-09-28 RX ADMIN — ATORVASTATIN CALCIUM 80 MG: 40 TABLET, FILM COATED ORAL at 00:44

## 2022-09-28 RX ADMIN — FAMOTIDINE 20 MG: 20 TABLET ORAL at 20:24

## 2022-09-28 RX ADMIN — DOCUSATE SODIUM 100 MG: 50 LIQUID ORAL at 00:44

## 2022-09-28 RX ADMIN — DOCUSATE SODIUM 100 MG: 50 LIQUID ORAL at 20:35

## 2022-09-28 RX ADMIN — TRAMADOL HYDROCHLORIDE 50 MG: 50 TABLET ORAL at 20:24

## 2022-09-28 RX ADMIN — OXYBUTYNIN CHLORIDE 2.5 MG: 5 TABLET ORAL at 00:44

## 2022-09-28 RX ADMIN — INSULIN DETEMIR 8 UNITS: 100 INJECTION, SOLUTION SUBCUTANEOUS at 09:02

## 2022-09-28 RX ADMIN — BUPROPION HYDROCHLORIDE 150 MG: 75 TABLET, FILM COATED ORAL at 08:56

## 2022-09-28 RX ADMIN — INSULIN HUMAN 3 UNITS: 100 INJECTION, SOLUTION PARENTERAL at 17:22

## 2022-09-28 RX ADMIN — Medication 10 ML: at 20:24

## 2022-09-28 RX ADMIN — OXYBUTYNIN CHLORIDE 2.5 MG: 5 TABLET ORAL at 08:56

## 2022-09-28 RX ADMIN — Medication 10 ML: at 08:57

## 2022-09-28 RX ADMIN — OXYBUTYNIN CHLORIDE 2.5 MG: 5 TABLET ORAL at 20:24

## 2022-09-28 RX ADMIN — BUPROPION HYDROCHLORIDE 150 MG: 75 TABLET, FILM COATED ORAL at 00:47

## 2022-09-28 RX ADMIN — DOCUSATE SODIUM 100 MG: 50 LIQUID ORAL at 08:56

## 2022-09-28 RX ADMIN — TRAMADOL HYDROCHLORIDE 50 MG: 50 TABLET ORAL at 02:30

## 2022-09-28 RX ADMIN — CARVEDILOL 3.12 MG: 3.12 TABLET, FILM COATED ORAL at 08:56

## 2022-09-28 RX ADMIN — CARVEDILOL 3.12 MG: 3.12 TABLET, FILM COATED ORAL at 17:22

## 2022-09-28 RX ADMIN — Medication 10 ML: at 00:44

## 2022-09-28 RX ADMIN — INSULIN DETEMIR 8 UNITS: 100 INJECTION, SOLUTION SUBCUTANEOUS at 20:23

## 2022-09-28 RX ADMIN — ATORVASTATIN CALCIUM 80 MG: 40 TABLET, FILM COATED ORAL at 20:24

## 2022-09-28 RX ADMIN — CLOPIDOGREL BISULFATE 75 MG: 75 TABLET ORAL at 00:44

## 2022-09-28 RX ADMIN — ASPIRIN 325 MG ORAL TABLET 325 MG: 325 PILL ORAL at 08:56

## 2022-09-28 RX ADMIN — FAMOTIDINE 20 MG: 20 TABLET ORAL at 00:47

## 2022-09-28 RX ADMIN — CLOPIDOGREL BISULFATE 75 MG: 75 TABLET ORAL at 20:24

## 2022-09-28 RX ADMIN — SPIRONOLACTONE 25 MG: 25 TABLET ORAL at 08:57

## 2022-09-29 ENCOUNTER — APPOINTMENT (OUTPATIENT)
Dept: GENERAL RADIOLOGY | Facility: HOSPITAL | Age: 72
End: 2022-09-29

## 2022-09-29 LAB
GLUCOSE BLDC GLUCOMTR-MCNC: 159 MG/DL (ref 70–130)
GLUCOSE BLDC GLUCOMTR-MCNC: 180 MG/DL (ref 70–130)
GLUCOSE BLDC GLUCOMTR-MCNC: 200 MG/DL (ref 70–130)
GLUCOSE BLDC GLUCOMTR-MCNC: 283 MG/DL (ref 70–130)
GLUCOSE BLDC GLUCOMTR-MCNC: 293 MG/DL (ref 70–130)
QT INTERVAL: 474 MS
QTC INTERVAL: 533 MS

## 2022-09-29 PROCEDURE — 97530 THERAPEUTIC ACTIVITIES: CPT | Performed by: OCCUPATIONAL THERAPIST

## 2022-09-29 PROCEDURE — 82962 GLUCOSE BLOOD TEST: CPT

## 2022-09-29 PROCEDURE — 74230 X-RAY XM SWLNG FUNCJ C+: CPT

## 2022-09-29 PROCEDURE — 97535 SELF CARE MNGMENT TRAINING: CPT | Performed by: OCCUPATIONAL THERAPIST

## 2022-09-29 PROCEDURE — 99232 SBSQ HOSP IP/OBS MODERATE 35: CPT | Performed by: INTERNAL MEDICINE

## 2022-09-29 PROCEDURE — 97110 THERAPEUTIC EXERCISES: CPT | Performed by: OCCUPATIONAL THERAPIST

## 2022-09-29 PROCEDURE — 63710000001 INSULIN DETEMIR PER 5 UNITS: Performed by: INTERNAL MEDICINE

## 2022-09-29 PROCEDURE — 63710000001 INSULIN REGULAR HUMAN PER 5 UNITS: Performed by: INTERNAL MEDICINE

## 2022-09-29 PROCEDURE — 92611 MOTION FLUOROSCOPY/SWALLOW: CPT

## 2022-09-29 RX ADMIN — CARVEDILOL 3.12 MG: 3.12 TABLET, FILM COATED ORAL at 17:06

## 2022-09-29 RX ADMIN — ATORVASTATIN CALCIUM 80 MG: 40 TABLET, FILM COATED ORAL at 20:33

## 2022-09-29 RX ADMIN — TRAMADOL HYDROCHLORIDE 50 MG: 50 TABLET ORAL at 17:06

## 2022-09-29 RX ADMIN — CARVEDILOL 3.12 MG: 3.12 TABLET, FILM COATED ORAL at 07:49

## 2022-09-29 RX ADMIN — Medication 10 ML: at 20:33

## 2022-09-29 RX ADMIN — ASPIRIN 325 MG ORAL TABLET 325 MG: 325 PILL ORAL at 07:48

## 2022-09-29 RX ADMIN — FAMOTIDINE 20 MG: 20 TABLET ORAL at 20:33

## 2022-09-29 RX ADMIN — BUPROPION HYDROCHLORIDE 150 MG: 75 TABLET, FILM COATED ORAL at 11:36

## 2022-09-29 RX ADMIN — BUPROPION HYDROCHLORIDE 150 MG: 75 TABLET, FILM COATED ORAL at 20:33

## 2022-09-29 RX ADMIN — CARBAMIDE PEROXIDE 6.5% 5 DROP: 6.5 LIQUID AURICULAR (OTIC) at 07:17

## 2022-09-29 RX ADMIN — INSULIN DETEMIR 8 UNITS: 100 INJECTION, SOLUTION SUBCUTANEOUS at 07:47

## 2022-09-29 RX ADMIN — TRAMADOL HYDROCHLORIDE 50 MG: 50 TABLET ORAL at 07:49

## 2022-09-29 RX ADMIN — SPIRONOLACTONE 25 MG: 25 TABLET ORAL at 07:49

## 2022-09-29 RX ADMIN — INSULIN DETEMIR 8 UNITS: 100 INJECTION, SOLUTION SUBCUTANEOUS at 20:32

## 2022-09-29 RX ADMIN — OXYBUTYNIN CHLORIDE 2.5 MG: 5 TABLET ORAL at 07:49

## 2022-09-29 RX ADMIN — CLOPIDOGREL BISULFATE 75 MG: 75 TABLET ORAL at 20:33

## 2022-09-29 RX ADMIN — DOCUSATE SODIUM 100 MG: 50 LIQUID ORAL at 07:48

## 2022-09-29 RX ADMIN — DOCUSATE SODIUM 100 MG: 50 LIQUID ORAL at 20:33

## 2022-09-29 RX ADMIN — SACUBITRIL AND VALSARTAN 1 TABLET: 24; 26 TABLET, FILM COATED ORAL at 20:33

## 2022-09-29 RX ADMIN — INSULIN HUMAN 3 UNITS: 100 INJECTION, SOLUTION PARENTERAL at 18:09

## 2022-09-29 RX ADMIN — OXYBUTYNIN CHLORIDE 2.5 MG: 5 TABLET ORAL at 20:33

## 2022-09-29 RX ADMIN — INSULIN HUMAN 5 UNITS: 100 INJECTION, SOLUTION PARENTERAL at 06:10

## 2022-09-29 RX ADMIN — INSULIN HUMAN 3 UNITS: 100 INJECTION, SOLUTION PARENTERAL at 00:24

## 2022-09-30 LAB
ANION GAP SERPL CALCULATED.3IONS-SCNC: 7 MMOL/L (ref 5–15)
BUN SERPL-MCNC: 29 MG/DL (ref 8–23)
BUN/CREAT SERPL: 21.6 (ref 7–25)
CALCIUM SPEC-SCNC: 8.6 MG/DL (ref 8.6–10.5)
CHLORIDE SERPL-SCNC: 104 MMOL/L (ref 98–107)
CO2 SERPL-SCNC: 29 MMOL/L (ref 22–29)
CREAT SERPL-MCNC: 1.34 MG/DL (ref 0.57–1)
EGFRCR SERPLBLD CKD-EPI 2021: 42.2 ML/MIN/1.73
GLUCOSE BLDC GLUCOMTR-MCNC: 122 MG/DL (ref 70–130)
GLUCOSE BLDC GLUCOMTR-MCNC: 135 MG/DL (ref 70–130)
GLUCOSE BLDC GLUCOMTR-MCNC: 144 MG/DL (ref 70–130)
GLUCOSE BLDC GLUCOMTR-MCNC: 164 MG/DL (ref 70–130)
GLUCOSE BLDC GLUCOMTR-MCNC: 201 MG/DL (ref 70–130)
GLUCOSE BLDC GLUCOMTR-MCNC: 204 MG/DL (ref 70–130)
GLUCOSE BLDC GLUCOMTR-MCNC: 65 MG/DL (ref 70–130)
GLUCOSE BLDC GLUCOMTR-MCNC: 67 MG/DL (ref 70–130)
GLUCOSE BLDC GLUCOMTR-MCNC: 72 MG/DL (ref 70–130)
GLUCOSE BLDC GLUCOMTR-MCNC: 96 MG/DL (ref 70–130)
GLUCOSE SERPL-MCNC: 134 MG/DL (ref 65–99)
POTASSIUM SERPL-SCNC: 4.6 MMOL/L (ref 3.5–5.2)
SODIUM SERPL-SCNC: 140 MMOL/L (ref 136–145)

## 2022-09-30 PROCEDURE — 63710000001 INSULIN REGULAR HUMAN PER 5 UNITS: Performed by: INTERNAL MEDICINE

## 2022-09-30 PROCEDURE — 99232 SBSQ HOSP IP/OBS MODERATE 35: CPT | Performed by: NURSE PRACTITIONER

## 2022-09-30 PROCEDURE — 97110 THERAPEUTIC EXERCISES: CPT

## 2022-09-30 PROCEDURE — 82962 GLUCOSE BLOOD TEST: CPT

## 2022-09-30 PROCEDURE — 97530 THERAPEUTIC ACTIVITIES: CPT

## 2022-09-30 PROCEDURE — 80048 BASIC METABOLIC PNL TOTAL CA: CPT | Performed by: INTERNAL MEDICINE

## 2022-09-30 PROCEDURE — 99232 SBSQ HOSP IP/OBS MODERATE 35: CPT | Performed by: INTERNAL MEDICINE

## 2022-09-30 RX ADMIN — ASPIRIN 325 MG ORAL TABLET 325 MG: 325 PILL ORAL at 09:57

## 2022-09-30 RX ADMIN — DOCUSATE SODIUM 100 MG: 50 LIQUID ORAL at 09:57

## 2022-09-30 RX ADMIN — DOCUSATE SODIUM 100 MG: 50 LIQUID ORAL at 21:59

## 2022-09-30 RX ADMIN — Medication 10 ML: at 21:58

## 2022-09-30 RX ADMIN — INSULIN HUMAN 4 UNITS: 100 INJECTION, SOLUTION PARENTERAL at 22:01

## 2022-09-30 RX ADMIN — DEXTROSE MONOHYDRATE 25 G: 25 INJECTION, SOLUTION INTRAVENOUS at 02:34

## 2022-09-30 RX ADMIN — OXYBUTYNIN CHLORIDE 2.5 MG: 5 TABLET ORAL at 09:57

## 2022-09-30 RX ADMIN — SPIRONOLACTONE 25 MG: 25 TABLET ORAL at 09:57

## 2022-09-30 RX ADMIN — TRAMADOL HYDROCHLORIDE 50 MG: 50 TABLET ORAL at 15:18

## 2022-09-30 RX ADMIN — BUPROPION HYDROCHLORIDE 150 MG: 75 TABLET, FILM COATED ORAL at 09:57

## 2022-09-30 RX ADMIN — CARVEDILOL 3.12 MG: 3.12 TABLET, FILM COATED ORAL at 09:57

## 2022-09-30 RX ADMIN — FAMOTIDINE 20 MG: 20 TABLET ORAL at 21:56

## 2022-09-30 RX ADMIN — SACUBITRIL AND VALSARTAN 1 TABLET: 24; 26 TABLET, FILM COATED ORAL at 09:57

## 2022-09-30 RX ADMIN — CLOPIDOGREL BISULFATE 75 MG: 75 TABLET ORAL at 21:58

## 2022-09-30 RX ADMIN — ATORVASTATIN CALCIUM 80 MG: 40 TABLET, FILM COATED ORAL at 21:57

## 2022-09-30 RX ADMIN — BUPROPION HYDROCHLORIDE 150 MG: 75 TABLET, FILM COATED ORAL at 21:58

## 2022-09-30 RX ADMIN — SACUBITRIL AND VALSARTAN 1 TABLET: 24; 26 TABLET, FILM COATED ORAL at 21:57

## 2022-09-30 RX ADMIN — CARVEDILOL 3.12 MG: 3.12 TABLET, FILM COATED ORAL at 17:20

## 2022-09-30 RX ADMIN — OXYBUTYNIN CHLORIDE 2.5 MG: 5 TABLET ORAL at 21:57

## 2022-09-30 RX ADMIN — Medication 10 ML: at 09:58

## 2022-10-01 LAB
ANION GAP SERPL CALCULATED.3IONS-SCNC: 7 MMOL/L (ref 5–15)
BASOPHILS # BLD AUTO: 0.03 10*3/MM3 (ref 0–0.2)
BASOPHILS NFR BLD AUTO: 0.4 % (ref 0–1.5)
BUN SERPL-MCNC: 27 MG/DL (ref 8–23)
BUN/CREAT SERPL: 15.9 (ref 7–25)
CALCIUM SPEC-SCNC: 8.3 MG/DL (ref 8.6–10.5)
CHLORIDE SERPL-SCNC: 105 MMOL/L (ref 98–107)
CO2 SERPL-SCNC: 27 MMOL/L (ref 22–29)
CREAT SERPL-MCNC: 1.7 MG/DL (ref 0.57–1)
DEPRECATED RDW RBC AUTO: 43.7 FL (ref 37–54)
EGFRCR SERPLBLD CKD-EPI 2021: 31.7 ML/MIN/1.73
EOSINOPHIL # BLD AUTO: 0.09 10*3/MM3 (ref 0–0.4)
EOSINOPHIL NFR BLD AUTO: 1.2 % (ref 0.3–6.2)
ERYTHROCYTE [DISTWIDTH] IN BLOOD BY AUTOMATED COUNT: 12.9 % (ref 12.3–15.4)
GLUCOSE BLDC GLUCOMTR-MCNC: 138 MG/DL (ref 70–130)
GLUCOSE BLDC GLUCOMTR-MCNC: 139 MG/DL (ref 70–130)
GLUCOSE BLDC GLUCOMTR-MCNC: 155 MG/DL (ref 70–130)
GLUCOSE BLDC GLUCOMTR-MCNC: 193 MG/DL (ref 70–130)
GLUCOSE BLDC GLUCOMTR-MCNC: 257 MG/DL (ref 70–130)
GLUCOSE SERPL-MCNC: 133 MG/DL (ref 65–99)
HCT VFR BLD AUTO: 34.5 % (ref 34–46.6)
HGB BLD-MCNC: 10.6 G/DL (ref 12–15.9)
IMM GRANULOCYTES # BLD AUTO: 0.02 10*3/MM3 (ref 0–0.05)
IMM GRANULOCYTES NFR BLD AUTO: 0.3 % (ref 0–0.5)
LYMPHOCYTES # BLD AUTO: 1.94 10*3/MM3 (ref 0.7–3.1)
LYMPHOCYTES NFR BLD AUTO: 26.2 % (ref 19.6–45.3)
MCH RBC QN AUTO: 28.7 PG (ref 26.6–33)
MCHC RBC AUTO-ENTMCNC: 30.7 G/DL (ref 31.5–35.7)
MCV RBC AUTO: 93.5 FL (ref 79–97)
MONOCYTES # BLD AUTO: 1.13 10*3/MM3 (ref 0.1–0.9)
MONOCYTES NFR BLD AUTO: 15.3 % (ref 5–12)
NEUTROPHILS NFR BLD AUTO: 4.19 10*3/MM3 (ref 1.7–7)
NEUTROPHILS NFR BLD AUTO: 56.6 % (ref 42.7–76)
NRBC BLD AUTO-RTO: 0 /100 WBC (ref 0–0.2)
PLATELET # BLD AUTO: 259 10*3/MM3 (ref 140–450)
PMV BLD AUTO: 10.2 FL (ref 6–12)
POTASSIUM SERPL-SCNC: 4.6 MMOL/L (ref 3.5–5.2)
RBC # BLD AUTO: 3.69 10*6/MM3 (ref 3.77–5.28)
SODIUM SERPL-SCNC: 139 MMOL/L (ref 136–145)
WBC NRBC COR # BLD: 7.4 10*3/MM3 (ref 3.4–10.8)

## 2022-10-01 PROCEDURE — 63710000001 INSULIN LISPRO (HUMAN) PER 5 UNITS: Performed by: FAMILY MEDICINE

## 2022-10-01 PROCEDURE — 85025 COMPLETE CBC W/AUTO DIFF WBC: CPT | Performed by: NURSE PRACTITIONER

## 2022-10-01 PROCEDURE — 82962 GLUCOSE BLOOD TEST: CPT

## 2022-10-01 PROCEDURE — 99232 SBSQ HOSP IP/OBS MODERATE 35: CPT | Performed by: NURSE PRACTITIONER

## 2022-10-01 PROCEDURE — 80048 BASIC METABOLIC PNL TOTAL CA: CPT | Performed by: NURSE PRACTITIONER

## 2022-10-01 RX ORDER — INSULIN LISPRO 100 [IU]/ML
0-7 INJECTION, SOLUTION INTRAVENOUS; SUBCUTANEOUS ONCE
Status: COMPLETED | OUTPATIENT
Start: 2022-10-01 | End: 2022-10-01

## 2022-10-01 RX ORDER — NICOTINE POLACRILEX 4 MG
15 LOZENGE BUCCAL
Status: DISCONTINUED | OUTPATIENT
Start: 2022-10-01 | End: 2022-10-05 | Stop reason: HOSPADM

## 2022-10-01 RX ORDER — ASPIRIN 325 MG
325 TABLET ORAL DAILY
Status: DISCONTINUED | OUTPATIENT
Start: 2022-10-02 | End: 2022-10-05 | Stop reason: HOSPADM

## 2022-10-01 RX ORDER — BISACODYL 5 MG/1
5 TABLET, DELAYED RELEASE ORAL DAILY PRN
Status: DISCONTINUED | OUTPATIENT
Start: 2022-10-01 | End: 2022-10-05 | Stop reason: HOSPADM

## 2022-10-01 RX ORDER — FAMOTIDINE 20 MG/1
20 TABLET, FILM COATED ORAL NIGHTLY
Status: DISCONTINUED | OUTPATIENT
Start: 2022-10-01 | End: 2022-10-05 | Stop reason: HOSPADM

## 2022-10-01 RX ORDER — POLYETHYLENE GLYCOL 3350 17 G/17G
17 POWDER, FOR SOLUTION ORAL DAILY PRN
Status: DISCONTINUED | OUTPATIENT
Start: 2022-10-01 | End: 2022-10-01

## 2022-10-01 RX ORDER — BUPROPION HYDROCHLORIDE 75 MG/1
150 TABLET ORAL EVERY 12 HOURS SCHEDULED
Status: COMPLETED | OUTPATIENT
Start: 2022-10-01 | End: 2022-10-01

## 2022-10-01 RX ORDER — AMOXICILLIN 250 MG
2 CAPSULE ORAL 2 TIMES DAILY
Status: DISCONTINUED | OUTPATIENT
Start: 2022-10-01 | End: 2022-10-05 | Stop reason: HOSPADM

## 2022-10-01 RX ORDER — POLYETHYLENE GLYCOL 3350 17 G/17G
17 POWDER, FOR SOLUTION ORAL DAILY PRN
Status: DISCONTINUED | OUTPATIENT
Start: 2022-10-01 | End: 2022-10-05 | Stop reason: HOSPADM

## 2022-10-01 RX ORDER — DOCUSATE SODIUM 100 MG/1
100 CAPSULE, LIQUID FILLED ORAL 2 TIMES DAILY
Status: DISCONTINUED | OUTPATIENT
Start: 2022-10-01 | End: 2022-10-05 | Stop reason: HOSPADM

## 2022-10-01 RX ORDER — CLOPIDOGREL BISULFATE 75 MG/1
75 TABLET ORAL EVERY 24 HOURS
Status: DISCONTINUED | OUTPATIENT
Start: 2022-10-01 | End: 2022-10-05 | Stop reason: HOSPADM

## 2022-10-01 RX ORDER — ASPIRIN 300 MG/1
300 SUPPOSITORY RECTAL DAILY
Status: DISCONTINUED | OUTPATIENT
Start: 2022-10-02 | End: 2022-10-05 | Stop reason: HOSPADM

## 2022-10-01 RX ORDER — OXYBUTYNIN CHLORIDE 5 MG/1
5 TABLET, EXTENDED RELEASE ORAL DAILY
Status: DISCONTINUED | OUTPATIENT
Start: 2022-10-02 | End: 2022-10-05 | Stop reason: HOSPADM

## 2022-10-01 RX ORDER — ATORVASTATIN CALCIUM 40 MG/1
80 TABLET, FILM COATED ORAL NIGHTLY
Status: DISCONTINUED | OUTPATIENT
Start: 2022-10-01 | End: 2022-10-05 | Stop reason: HOSPADM

## 2022-10-01 RX ORDER — INSULIN LISPRO 100 [IU]/ML
0-7 INJECTION, SOLUTION INTRAVENOUS; SUBCUTANEOUS
Status: DISCONTINUED | OUTPATIENT
Start: 2022-10-01 | End: 2022-10-02

## 2022-10-01 RX ORDER — BUPROPION HYDROCHLORIDE 150 MG/1
300 TABLET ORAL DAILY
Status: DISCONTINUED | OUTPATIENT
Start: 2022-10-02 | End: 2022-10-05 | Stop reason: HOSPADM

## 2022-10-01 RX ORDER — TRAMADOL HYDROCHLORIDE 50 MG/1
50 TABLET ORAL EVERY 8 HOURS PRN
Status: DISCONTINUED | OUTPATIENT
Start: 2022-10-01 | End: 2022-10-05 | Stop reason: HOSPADM

## 2022-10-01 RX ORDER — OXYBUTYNIN CHLORIDE 5 MG/1
2.5 TABLET ORAL 2 TIMES DAILY
Status: COMPLETED | OUTPATIENT
Start: 2022-10-01 | End: 2022-10-01

## 2022-10-01 RX ORDER — ACETAMINOPHEN 325 MG/1
650 TABLET ORAL EVERY 6 HOURS PRN
Status: DISCONTINUED | OUTPATIENT
Start: 2022-10-01 | End: 2022-10-05 | Stop reason: HOSPADM

## 2022-10-01 RX ORDER — BISACODYL 10 MG
10 SUPPOSITORY, RECTAL RECTAL DAILY PRN
Status: DISCONTINUED | OUTPATIENT
Start: 2022-10-01 | End: 2022-10-05 | Stop reason: HOSPADM

## 2022-10-01 RX ORDER — SPIRONOLACTONE 25 MG/1
25 TABLET ORAL DAILY
Status: DISCONTINUED | OUTPATIENT
Start: 2022-10-02 | End: 2022-10-05 | Stop reason: HOSPADM

## 2022-10-01 RX ORDER — CARVEDILOL 3.12 MG/1
3.12 TABLET ORAL 2 TIMES DAILY WITH MEALS
Status: DISCONTINUED | OUTPATIENT
Start: 2022-10-01 | End: 2022-10-05 | Stop reason: HOSPADM

## 2022-10-01 RX ADMIN — CARVEDILOL 3.12 MG: 3.12 TABLET, FILM COATED ORAL at 11:19

## 2022-10-01 RX ADMIN — SENNOSIDES AND DOCUSATE SODIUM 2 TABLET: 50; 8.6 TABLET ORAL at 21:48

## 2022-10-01 RX ADMIN — ATORVASTATIN CALCIUM 80 MG: 40 TABLET, FILM COATED ORAL at 21:47

## 2022-10-01 RX ADMIN — ASPIRIN 325 MG ORAL TABLET 325 MG: 325 PILL ORAL at 11:16

## 2022-10-01 RX ADMIN — POLYETHYLENE GLYCOL 3350 17 G: 17 POWDER, FOR SOLUTION ORAL at 11:12

## 2022-10-01 RX ADMIN — TRAMADOL HYDROCHLORIDE 50 MG: 50 TABLET ORAL at 01:33

## 2022-10-01 RX ADMIN — OXYBUTYNIN CHLORIDE 2.5 MG: 5 TABLET ORAL at 21:47

## 2022-10-01 RX ADMIN — SACUBITRIL AND VALSARTAN 1 TABLET: 24; 26 TABLET, FILM COATED ORAL at 11:19

## 2022-10-01 RX ADMIN — BUPROPION HYDROCHLORIDE 150 MG: 75 TABLET, FILM COATED ORAL at 21:48

## 2022-10-01 RX ADMIN — SACUBITRIL AND VALSARTAN 1 TABLET: 24; 26 TABLET, FILM COATED ORAL at 21:47

## 2022-10-01 RX ADMIN — Medication 10 ML: at 21:48

## 2022-10-01 RX ADMIN — CLOPIDOGREL BISULFATE 75 MG: 75 TABLET ORAL at 21:47

## 2022-10-01 RX ADMIN — OXYBUTYNIN CHLORIDE 2.5 MG: 5 TABLET ORAL at 11:14

## 2022-10-01 RX ADMIN — DOCUSATE SODIUM 100 MG: 100 CAPSULE, LIQUID FILLED ORAL at 21:48

## 2022-10-01 RX ADMIN — CARVEDILOL 3.12 MG: 3.12 TABLET, FILM COATED ORAL at 17:48

## 2022-10-01 RX ADMIN — FAMOTIDINE 20 MG: 20 TABLET ORAL at 21:48

## 2022-10-01 RX ADMIN — TRAMADOL HYDROCHLORIDE 50 MG: 50 TABLET ORAL at 11:15

## 2022-10-01 RX ADMIN — Medication 10 ML: at 11:28

## 2022-10-01 RX ADMIN — BUPROPION HYDROCHLORIDE 150 MG: 75 TABLET, FILM COATED ORAL at 11:20

## 2022-10-01 RX ADMIN — ACETAMINOPHEN 650 MG: 325 TABLET, FILM COATED ORAL at 16:15

## 2022-10-01 RX ADMIN — DOCUSATE SODIUM 100 MG: 50 LIQUID ORAL at 11:20

## 2022-10-01 RX ADMIN — SPIRONOLACTONE 25 MG: 25 TABLET ORAL at 11:20

## 2022-10-01 RX ADMIN — INSULIN LISPRO 2 UNITS: 100 INJECTION, SOLUTION INTRAVENOUS; SUBCUTANEOUS at 13:25

## 2022-10-01 NOTE — PLAN OF CARE
Goal Outcome Evaluation:   Pt rested in bed all day.  Pt refused to sit in chair.  Pt with back pain, especially with sitting up.  Pt turned on side, very fearful of falling out of bed.  Pt given tramadol and then a dose of tylenol, since pt not due for ultram again and had systolic less than 100.  Pt calm most of day but if she is unable to get attention, she throws her tray/lid onto floor to make noise.  Pt sister called asking about dc details.  Pt friends came to visit with suitcase for when she is transferred.  Pt still has not had a bm since 9/24.  She was given miralax.  Tolerated her meds.

## 2022-10-01 NOTE — PROGRESS NOTES
[facility] Medicine Services  PROGRESS NOTE    Patient Name: Catherine Carroll  : 1950  MRN: [id number]    Date of Admission: 2022  Primary Care Physician: Sujit Batista MD    Subjective   Subjective     CC:  Follow up CVA, takotsubo cardiomyopathy    HPI:  Patient seen resting in bed in no apparent distress. No acute events overnight per nursing. She continues to have left sided weakness. She is aware of plan for rehab. No new complaints at this time.     ROS:  Gen- No fevers, chills  CV- No chest pain, palpitations  Resp- No cough, dyspnea  GI- No N/V/D, abd pain    Objective   Objective     Vital Signs:   Temp:  [97 °F (36.1 °C)-98.5 °F (36.9 °C)] 97.3 °F (36.3 °C)  Heart Rate:  [69-97] 72  Resp:  [14-18] 18  BP: ()/(44-70) 117/62  Flow (L/min):  [2] 2     Physical Exam:  Constitutional: No acute distress, awake, alert  HENT: NCAT, mucous membranes moist  Respiratory: Clear to auscultation bilaterally, respiratory effort normal on 2L NC   Cardiovascular: RRR, no murmurs, palpable pedal pulses bilaterally, cap refill brisk   Gastrointestinal: Positive bowel sounds, soft, nontender, nondistended  Musculoskeletal: No BLE edema   Psychiatric: Appropriate affect, cooperative  Neurologic: Oriented x 3, left sided weakness, mild facial droop, speech clear  Skin: warm, dry, no visible rash     Results Reviewed:  LAB RESULTS:      Lab 10/01/22  0644 22  0945   WBC 7.40 6.76   HEMOGLOBIN 10.6* 10.6*   HEMATOCRIT 34.5 31.7*   PLATELETS 259 211   NEUTROS ABS 4.19  --    IMMATURE GRANS (ABS) 0.02  --    LYMPHS ABS 1.94  --    MONOS ABS 1.13*  --    EOS ABS 0.09  --    MCV 93.5 89.3         Lab 10/01/22  0644 22  1054 22  0945 22  0916 224 22  1239   SODIUM 139 140 141 139  --   --    POTASSIUM 4.6 4.6 5.2 4.7  --   --    CHLORIDE 105 104 103 104  --   --    CO2 27.0 29.0 26.0 26.0  --   --    ANION GAP 7.0 7.0 12.0 9.0  --   --    BUN  27* 29* 29* 34*  --   --    CREATININE 1.70* 1.34* 1.36* 1.35*  --   --    EGFR 31.7* 42.2* 41.5* 41.8*  --   --    GLUCOSE 133* 134* 108* 115*  --   --    CALCIUM 8.3* 8.6 8.4* 8.5*  --   --    PHOSPHORUS  --   --   --   --  2.4* 2.0*                         Brief Urine Lab Results     None          Microbiology Results Abnormal     None          FL Video Swallow With Speech Single Contrast    Result Date: 9/30/2022  EXAMINATION: FL VIDEO SWALLOW W SPEECH SINGLE-CONTRAST-  INDICATION: dysphagia; R13.12-Dysphagia, oropharyngeal phase; R47.1-Dysarthria and anarthria; R41.841-Cognitive communication deficit  TECHNIQUE: 1 minute and 12 seconds of fluoroscopic time was used for this exam. 12 associated fluoroscopic loops were saved. The patient was evaluated in the seated lateral position while taking a variety of consistencies of barium by mouth under the direction of speech pathology.  COMPARISON: NONE  FINDINGS: Limited and 12 seconds of fluoroscopy provided for a modified barium swallow. Please see speech therapy report for full details and recommendations.       Impression: Fluoroscopy provided for a modified barium swallow. Please see speech therapy report for full details and recommendations.    This report was finalized on 9/30/2022 9:10 AM by Dr. Caleb Mukherjee MD.        Results for orders placed during the hospital encounter of 09/21/22    Adult Transthoracic Echo Complete W/ Cont if Necessary Per Protocol (With Agitated Saline)    Interpretation Summary  · Estimated left ventricular EF = 30%  · Left ventricular wall thickness is consistent with mild concentric hypertrophy.  · The findings are consistent with stress-induced (Takotsubo) cardiomyopathy.  · The cardiac valves are anatomically and functionally normal.  · Saline test results are negative.      I have reviewed the medications:  Scheduled Meds:aspirin, 325 mg, Nasogastric, Daily   Or  aspirin, 300 mg, Rectal, Daily  atorvastatin, 80 mg, Nasogastric,  Nightly  buPROPion, 150 mg, Nasogastric, Q12H  carvedilol, 3.125 mg, Nasogastric, BID With Meals  clopidogrel, 75 mg, Nasogastric, Q24H  docusate sodium, 100 mg, Nasogastric, BID  famotidine, 20 mg, Nasogastric, Nightly  insulin regular, 0-14 Units, Subcutaneous, 4x Daily AC & at Bedtime  oxybutynin, 2.5 mg, Nasogastric, BID  sacubitril-valsartan, 1 tablet, Nasogastric, Q12H  sodium chloride, 10 mL, Intravenous, Q12H  spironolactone, 25 mg, Nasogastric, Daily      Continuous Infusions:   PRN Meds:.carbamide peroxide  •  dextrose  •  dextrose  •  glucagon (human recombinant)  •  ondansetron  •  polyethylene glycol  •  potassium & sodium phosphates **OR** potassium & sodium phosphates  •  sodium chloride  •  traMADol    Assessment & Plan   Assessment & Plan     Active Hospital Problems    Diagnosis  POA   • **Acute ischemic right MCA stroke (HCC) [I63.511]  Yes   • Dysphagia, unspecified type [R13.10]  Yes   • Acute ischemic stroke (HCC) [I63.9]  Yes   • S/p left hip fracture [Z87.81]  Not Applicable   • DM (diabetes mellitus), type 2 (HCC) [E11.9]  Yes      Resolved Hospital Problems   No resolved problems to display.        Brief Hospital Course to date:  Catherine Carroll is a 72 y.o. female with diabetes, recent left hip fracture, CKD, hypertension, sleep apnea, dyslipidemia, generalized anxiety disorder, depression, anemia, multiple falls, and GERD who presented to [facility] on 9/21/2022 after transfer from [facility] for acute CVA management.  Patient apparently was admitted there on September 19 with left hip/pelvic fracture after sustaining a fall.  Orthopedics evaluated the patient and it was felt to be inoperable fracture.  The following morning, patient was found to have development of acute encephalopathy, facial droop, aphasia and left-sided weakness.  Code stroke was called and patient was evaluated.  Patient was given IV tPA..  Further work-up including CTA showed filling  defect at the distal right M1 branch extending into the M2 branch.  Subacute infarction noted in the right MCA territory.  CT head perfusion showed right MCA territory CVA with associated penumbra.  Case was discussed with interventional neurology and patient was brought in emergently for thrombectomy.  Patient underwent cerebral angiogram where she was found to have abrupt occlusion of the dominant superior division M2 branch of the right MCA consistent with acute thromboembolic event.  Mechanical thrombectomy was done with the return of normal flow to the right cerebral hemisphere.  Post procedure patient was admitted to ICU for closer monitoring. Patient transferred from ICU to telemetry 9/24. CM is following for SNF placement.      This patient's problems and plans were partially entered by my partner and updated as appropriate by me 10/01/22.     Assessment/Plan:     Acute ischemic stroke, right MCA, superior division, likely etiology thromboembolism   -S/P tPA at OSH and mechanical thrombectomy at [facility]   -MRI could not be done due to bladder stimulator in place  -Continue DAPT, statin  -Cardiac monitor at DC-may not be feasible with life vest  -PT/OT/SLP  -Follow-up stroke clinic 1 month      Takotsubo's Cardiomyopathy   Unstable Angina  Abnormal EKG  HLD  -Cardiology following, [facility] showed no significant ischemic heart disease  -Continue lower dose of Coreg, 3.125mg BID   -Continue Aldactone 25 mg daily.  -Started Entresto per cardiology  -Life vest prior to discharge  -Follow up with Cardiology in 6 weeks.      Severe oral, Moderate pharyngeal Dysphagia   -SLP following  -Started on dysphagia diet   --s/p mbs 9/29.  Recs to continue current diet mechanical soft, no mixed consistencies and nectar thickened liquids.     HTN  -Normal BP goals per Neuro      DM2  -A1c 5.7%  -Continue basal/bolus insulin  -- Glucose stable.     S/P L hip Fx  Multiple Falls   -Non-operative management per outside Ortho eval   -WBAT,  PT/OT     CKD  -Creatinine at baseline, ~1.3   -Monitor with initiation of Entresto  -- Creatinine increased to 1.70.  Monitor  --am labs      Anxiety/Depression  -Continue Wellbutrin   -- Appears stable.     GERD  -Continue Pepcid      Expected Discharge Location and Transportation: SNF with transition to LTC    Expected Discharge Date: 10/3    DVT prophylaxis:  Mechanical DVT prophylaxis orders are present.     AM-PAC 6 Clicks Score (PT): 9 (09/30/22 1508)    CODE STATUS:   Code Status and Medical Interventions:   Ordered at: 09/28/22 1928     Code Status (Patient has no pulse and is not breathing):    CPR (Attempt to Resuscitate)     Medical Interventions (Patient has pulse or is breathing):    Full Support     Release to patient:    Routine Release       Rey Pizarro, DAMIEN  10/01/22

## 2022-10-01 NOTE — PLAN OF CARE
Goal Outcome Evaluation:              Outcome Evaluation: Pt's VSS, RA-2L NC, SA-NSR. Complaints of pain addressed with PRN Tramdol. Continue POC.

## 2022-10-02 LAB
ANION GAP SERPL CALCULATED.3IONS-SCNC: 10 MMOL/L (ref 5–15)
BUN SERPL-MCNC: 27 MG/DL (ref 8–23)
BUN/CREAT SERPL: 15.5 (ref 7–25)
CALCIUM SPEC-SCNC: 8.6 MG/DL (ref 8.6–10.5)
CHLORIDE SERPL-SCNC: 104 MMOL/L (ref 98–107)
CO2 SERPL-SCNC: 27 MMOL/L (ref 22–29)
CREAT SERPL-MCNC: 1.74 MG/DL (ref 0.57–1)
DEPRECATED RDW RBC AUTO: 42.7 FL (ref 37–54)
EGFRCR SERPLBLD CKD-EPI 2021: 30.9 ML/MIN/1.73
ERYTHROCYTE [DISTWIDTH] IN BLOOD BY AUTOMATED COUNT: 12.6 % (ref 12.3–15.4)
GLUCOSE BLDC GLUCOMTR-MCNC: 157 MG/DL (ref 70–130)
GLUCOSE BLDC GLUCOMTR-MCNC: 166 MG/DL (ref 70–130)
GLUCOSE BLDC GLUCOMTR-MCNC: 194 MG/DL (ref 70–130)
GLUCOSE BLDC GLUCOMTR-MCNC: 347 MG/DL (ref 70–130)
GLUCOSE SERPL-MCNC: 172 MG/DL (ref 65–99)
HCT VFR BLD AUTO: 32.5 % (ref 34–46.6)
HGB BLD-MCNC: 10.2 G/DL (ref 12–15.9)
MCH RBC QN AUTO: 29.3 PG (ref 26.6–33)
MCHC RBC AUTO-ENTMCNC: 31.4 G/DL (ref 31.5–35.7)
MCV RBC AUTO: 93.4 FL (ref 79–97)
PLATELET # BLD AUTO: 251 10*3/MM3 (ref 140–450)
PMV BLD AUTO: 10.1 FL (ref 6–12)
POTASSIUM SERPL-SCNC: 4.8 MMOL/L (ref 3.5–5.2)
RBC # BLD AUTO: 3.48 10*6/MM3 (ref 3.77–5.28)
SODIUM SERPL-SCNC: 141 MMOL/L (ref 136–145)
WBC NRBC COR # BLD: 7.96 10*3/MM3 (ref 3.4–10.8)

## 2022-10-02 PROCEDURE — 63710000001 INSULIN LISPRO (HUMAN) PER 5 UNITS

## 2022-10-02 PROCEDURE — 99232 SBSQ HOSP IP/OBS MODERATE 35: CPT | Performed by: NURSE PRACTITIONER

## 2022-10-02 PROCEDURE — 82962 GLUCOSE BLOOD TEST: CPT

## 2022-10-02 PROCEDURE — 80048 BASIC METABOLIC PNL TOTAL CA: CPT | Performed by: NURSE PRACTITIONER

## 2022-10-02 PROCEDURE — 85027 COMPLETE CBC AUTOMATED: CPT | Performed by: NURSE PRACTITIONER

## 2022-10-02 RX ORDER — INSULIN LISPRO 100 [IU]/ML
0-7 INJECTION, SOLUTION INTRAVENOUS; SUBCUTANEOUS
Status: DISCONTINUED | OUTPATIENT
Start: 2022-10-02 | End: 2022-10-05 | Stop reason: HOSPADM

## 2022-10-02 RX ADMIN — INSULIN LISPRO 2 UNITS: 100 INJECTION, SOLUTION INTRAVENOUS; SUBCUTANEOUS at 08:46

## 2022-10-02 RX ADMIN — SACUBITRIL AND VALSARTAN 1 TABLET: 24; 26 TABLET, FILM COATED ORAL at 21:09

## 2022-10-02 RX ADMIN — FAMOTIDINE 20 MG: 20 TABLET ORAL at 21:09

## 2022-10-02 RX ADMIN — ACETAMINOPHEN 650 MG: 325 TABLET, FILM COATED ORAL at 05:05

## 2022-10-02 RX ADMIN — CLOPIDOGREL BISULFATE 75 MG: 75 TABLET ORAL at 21:09

## 2022-10-02 RX ADMIN — INSULIN LISPRO 2 UNITS: 100 INJECTION, SOLUTION INTRAVENOUS; SUBCUTANEOUS at 17:05

## 2022-10-02 RX ADMIN — SENNOSIDES AND DOCUSATE SODIUM 2 TABLET: 50; 8.6 TABLET ORAL at 08:46

## 2022-10-02 RX ADMIN — SPIRONOLACTONE 25 MG: 25 TABLET ORAL at 08:46

## 2022-10-02 RX ADMIN — ATORVASTATIN CALCIUM 80 MG: 40 TABLET, FILM COATED ORAL at 21:09

## 2022-10-02 RX ADMIN — ASPIRIN 325 MG ORAL TABLET 325 MG: 325 PILL ORAL at 08:46

## 2022-10-02 RX ADMIN — CARVEDILOL 3.12 MG: 3.12 TABLET, FILM COATED ORAL at 08:46

## 2022-10-02 RX ADMIN — INSULIN LISPRO 5 UNITS: 100 INJECTION, SOLUTION INTRAVENOUS; SUBCUTANEOUS at 21:09

## 2022-10-02 RX ADMIN — SACUBITRIL AND VALSARTAN 1 TABLET: 24; 26 TABLET, FILM COATED ORAL at 08:46

## 2022-10-02 RX ADMIN — Medication 10 ML: at 08:46

## 2022-10-02 RX ADMIN — BUPROPION HYDROCHLORIDE 300 MG: 150 TABLET, FILM COATED, EXTENDED RELEASE ORAL at 08:46

## 2022-10-02 RX ADMIN — TRAMADOL HYDROCHLORIDE 50 MG: 50 TABLET, COATED ORAL at 13:13

## 2022-10-02 RX ADMIN — CARVEDILOL 3.12 MG: 3.12 TABLET, FILM COATED ORAL at 17:05

## 2022-10-02 RX ADMIN — DOCUSATE SODIUM 100 MG: 100 CAPSULE, LIQUID FILLED ORAL at 08:46

## 2022-10-02 RX ADMIN — INSULIN LISPRO 2 UNITS: 100 INJECTION, SOLUTION INTRAVENOUS; SUBCUTANEOUS at 11:55

## 2022-10-02 RX ADMIN — OXYBUTYNIN CHLORIDE 5 MG: 5 TABLET, EXTENDED RELEASE ORAL at 08:46

## 2022-10-02 RX ADMIN — Medication 10 ML: at 22:00

## 2022-10-02 NOTE — PROGRESS NOTES
[facility] Medicine Services  PROGRESS NOTE    Patient Name: Catherine Carroll  : 1950  MRN: [id number]    Date of Admission: 2022  Primary Care Physician: Sujit Batista MD    Subjective   Subjective     CC:  Follow up CVA, takotsubo cardiomyopathy    HPI:  Patient seen resting in bed in no apparent distress. No acute events overnight per nursing. She is feeling about the same today. Continues to have left hip pain. Awaiting rehab placement. No additional concerns at this time.     ROS:  Gen- No fevers, chills  CV- No chest pain, palpitations  Resp- No cough, dyspnea  GI- No N/V/D, abd pain    Objective   Objective     Vital Signs:   Temp:  [97.5 °F (36.4 °C)-98.3 °F (36.8 °C)] 97.9 °F (36.6 °C)  Heart Rate:  [66-81] 72  Resp:  [18] 18  BP: ()/(43-94) 101/58     Physical Exam:  Constitutional: No acute distress, awake, alert  HENT: NCAT, mucous membranes moist  Respiratory: Clear to auscultation bilaterally, respiratory effort normal on RA  Cardiovascular: RRR, no murmurs, cap refill brisk   Gastrointestinal: Positive bowel sounds, soft, nontender, nondistended  Musculoskeletal: No BLE edema   Psychiatric: Appropriate affect, cooperative  Neurologic: Oriented x 3, left sided weakness, mild facial droop, speech clear  Skin: warm, dry, no visible rash     Results Reviewed:  LAB RESULTS:      Lab 10/02/22  0749 10/01/22  0644 22  0945   WBC 7.96 7.40 6.76   HEMOGLOBIN 10.2* 10.6* 10.6*   HEMATOCRIT 32.5* 34.5 31.7*   PLATELETS 251 259 211   NEUTROS ABS  --  4.19  --    IMMATURE GRANS (ABS)  --  0.02  --    LYMPHS ABS  --  1.94  --    MONOS ABS  --  1.13*  --    EOS ABS  --  0.09  --    MCV 93.4 93.5 89.3         Lab 10/01/22  0644 22  1054 22  0945 22  0916   SODIUM 139 140 141 139   POTASSIUM 4.6 4.6 5.2 4.7   CHLORIDE 105 104 103 104   CO2 27.0 29.0 26.0 26.0   ANION GAP 7.0 7.0 12.0 9.0   BUN 27* 29* 29* 34*   CREATININE 1.70* 1.34* 1.36*  1.35*   EGFR 31.7* 42.2* 41.5* 41.8*   GLUCOSE 133* 134* 108* 115*   CALCIUM 8.3* 8.6 8.4* 8.5*                         Brief Urine Lab Results     None          Microbiology Results Abnormal     None          No radiology results from the last 24 hrs    Results for orders placed during the hospital encounter of 09/21/22    Adult Transthoracic Echo Complete W/ Cont if Necessary Per Protocol (With Agitated Saline)    Interpretation Summary  · Estimated left ventricular EF = 30%  · Left ventricular wall thickness is consistent with mild concentric hypertrophy.  · The findings are consistent with stress-induced (Takotsubo) cardiomyopathy.  · The cardiac valves are anatomically and functionally normal.  · Saline test results are negative.      I have reviewed the medications:  Scheduled Meds:aspirin, 325 mg, Oral, Daily   Or  aspirin, 300 mg, Rectal, Daily  atorvastatin, 80 mg, Oral, Nightly  buPROPion XL, 300 mg, Oral, Daily  carvedilol, 3.125 mg, Oral, BID With Meals  clopidogrel, 75 mg, Oral, Q24H  docusate sodium, 100 mg, Oral, BID  famotidine, 20 mg, Oral, Nightly  insulin lispro, 0-7 Units, Subcutaneous, TID AC  oxybutynin XL, 5 mg, Oral, Daily  sacubitril-valsartan, 1 tablet, Oral, Q12H  senna-docusate sodium, 2 tablet, Oral, BID  sodium chloride, 10 mL, Intravenous, Q12H  spironolactone, 25 mg, Oral, Daily      Continuous Infusions:   PRN Meds:.•  acetaminophen  •  senna-docusate sodium **AND** polyethylene glycol **AND** bisacodyl **AND** bisacodyl  •  carbamide peroxide  •  dextrose  •  dextrose  •  glucagon (human recombinant)  •  ondansetron  •  potassium & sodium phosphates **OR** potassium & sodium phosphates  •  sodium chloride  •  traMADol    Assessment & Plan   Assessment & Plan     Active Hospital Problems    Diagnosis  POA   • **Acute ischemic right MCA stroke (HCC) [I63.511]  Yes   • Dysphagia, unspecified type [R13.10]  Yes   • Acute ischemic stroke (HCC) [I63.9]  Yes   • S/p left hip fracture  [Z87.81]  Not Applicable   • DM (diabetes mellitus), type 2 (HCC) [E11.9]  Yes      Resolved Hospital Problems   No resolved problems to display.        Brief Hospital Course to date:  Catherine Carroll is a 72 y.o. female with diabetes, recent left hip fracture, CKD, hypertension, sleep apnea, dyslipidemia, generalized anxiety disorder, depression, anemia, multiple falls, and GERD who presented to [facility] on 9/21/2022 after transfer from [facility] for acute CVA management.  Patient apparently was admitted there on September 19 with left hip/pelvic fracture after sustaining a fall.  Orthopedics evaluated the patient and it was felt to be inoperable fracture.  The following morning, patient was found to have development of acute encephalopathy, facial droop, aphasia and left-sided weakness.  Code stroke was called and patient was evaluated.  Patient was given IV tPA..  Further work-up including CTA showed filling defect at the distal right M1 branch extending into the M2 branch.  Subacute infarction noted in the right MCA territory.  CT head perfusion showed right MCA territory CVA with associated penumbra.  Case was discussed with interventional neurology and patient was brought in emergently for thrombectomy.  Patient underwent cerebral angiogram where she was found to have abrupt occlusion of the dominant superior division M2 branch of the right MCA consistent with acute thromboembolic event.  Mechanical thrombectomy was done with the return of normal flow to the right cerebral hemisphere.  Post procedure patient was admitted to ICU for closer monitoring. Patient transferred from ICU to telemetry 9/24. CM is following for SNF placement.      This patient's problems and plans were partially entered by my partner and updated as appropriate by me 10/02/22.     Assessment/Plan:     Acute ischemic stroke, right MCA, superior division, likely etiology thromboembolism   -S/P tPA at OSH and  mechanical thrombectomy at [facility]   -MRI could not be done due to bladder stimulator in place  -Continue DAPT, statin  -Cardiac monitor at DC-may not be feasible with life vest  -PT/OT/SLP  -Follow-up stroke clinic 1 month      Takotsubo's Cardiomyopathy   Unstable Angina  Abnormal EKG  HLD  -Cardiology following, [facility] showed no significant ischemic heart disease  -Continue lower dose of Coreg, 3.125mg BID   -Continue Aldactone 25 mg daily.  -Started Entresto per cardiology  -Life vest prior to discharge  -Follow up with Cardiology in 6 weeks.      Severe oral, Moderate pharyngeal Dysphagia   -SLP following  -Started on dysphagia diet   --s/p mbs 9/29.  Recs to continue current diet mechanical soft, no mixed consistencies and nectar thickened liquids.     HTN  -Normal BP goals per Neuro   -continue Coreg, Entresto, Aldactone      DM2  -A1c 5.7%  -Continue basal/bolus insulin  -- Glucose stable.     S/P L hip Fx  Multiple Falls   -Non-operative management per outside Ortho eval   -WBAT, PT/OT     CKD  -Creatinine at baseline, ~1.3   -Monitor with initiation of Entresto, Aldactone   -- Creatinine increased to 1.74 this AM   -encouraged oral hydration  --am labs       Anxiety/Depression  -Continue Wellbutrin   -- Appears stable.     GERD  -Continue Pepcid      Expected Discharge Location and Transportation: SNF with transition to LTC    Expected Discharge Date: 10/3    DVT prophylaxis:  Mechanical DVT prophylaxis orders are present.     AM-PAC 6 Clicks Score (PT): 9 (09/30/22 6130)    CODE STATUS:   Code Status and Medical Interventions:   Ordered at: 09/28/22 8278     Code Status (Patient has no pulse and is not breathing):    CPR (Attempt to Resuscitate)     Medical Interventions (Patient has pulse or is breathing):    Full Support     Release to patient:    Routine Release       Rey Pizarro, DAMIEN  10/02/22

## 2022-10-02 NOTE — PLAN OF CARE
Goal Outcome Evaluation:  Plan of Care Reviewed With: patient        Progress: no change  Outcome Evaluation: VSS, on RA throughout night. No complaints of pain. Awaiting placement at SNF. Will continue with current POC

## 2022-10-03 LAB
GLUCOSE BLDC GLUCOMTR-MCNC: 128 MG/DL (ref 70–130)
GLUCOSE BLDC GLUCOMTR-MCNC: 153 MG/DL (ref 70–130)
GLUCOSE BLDC GLUCOMTR-MCNC: 157 MG/DL (ref 70–130)
GLUCOSE BLDC GLUCOMTR-MCNC: 238 MG/DL (ref 70–130)

## 2022-10-03 PROCEDURE — 82962 GLUCOSE BLOOD TEST: CPT

## 2022-10-03 PROCEDURE — 97535 SELF CARE MNGMENT TRAINING: CPT

## 2022-10-03 PROCEDURE — 92507 TX SP LANG VOICE COMM INDIV: CPT

## 2022-10-03 PROCEDURE — 63710000001 INSULIN LISPRO (HUMAN) PER 5 UNITS

## 2022-10-03 PROCEDURE — 99232 SBSQ HOSP IP/OBS MODERATE 35: CPT | Performed by: INTERNAL MEDICINE

## 2022-10-03 PROCEDURE — 97110 THERAPEUTIC EXERCISES: CPT

## 2022-10-03 PROCEDURE — 92526 ORAL FUNCTION THERAPY: CPT

## 2022-10-03 RX ADMIN — TRAMADOL HYDROCHLORIDE 50 MG: 50 TABLET, COATED ORAL at 07:31

## 2022-10-03 RX ADMIN — INSULIN LISPRO 2 UNITS: 100 INJECTION, SOLUTION INTRAVENOUS; SUBCUTANEOUS at 20:37

## 2022-10-03 RX ADMIN — FAMOTIDINE 20 MG: 20 TABLET ORAL at 20:37

## 2022-10-03 RX ADMIN — Medication 10 ML: at 08:19

## 2022-10-03 RX ADMIN — ATORVASTATIN CALCIUM 80 MG: 40 TABLET, FILM COATED ORAL at 20:37

## 2022-10-03 RX ADMIN — CLOPIDOGREL BISULFATE 75 MG: 75 TABLET ORAL at 20:37

## 2022-10-03 RX ADMIN — SACUBITRIL AND VALSARTAN 1 TABLET: 24; 26 TABLET, FILM COATED ORAL at 08:19

## 2022-10-03 RX ADMIN — CARVEDILOL 3.12 MG: 3.12 TABLET, FILM COATED ORAL at 17:01

## 2022-10-03 RX ADMIN — SACUBITRIL AND VALSARTAN 1 TABLET: 24; 26 TABLET, FILM COATED ORAL at 20:37

## 2022-10-03 RX ADMIN — Medication 10 ML: at 20:37

## 2022-10-03 RX ADMIN — INSULIN LISPRO 2 UNITS: 100 INJECTION, SOLUTION INTRAVENOUS; SUBCUTANEOUS at 17:02

## 2022-10-03 RX ADMIN — ASPIRIN 325 MG ORAL TABLET 325 MG: 325 PILL ORAL at 08:19

## 2022-10-03 RX ADMIN — BUPROPION HYDROCHLORIDE 300 MG: 150 TABLET, FILM COATED, EXTENDED RELEASE ORAL at 08:19

## 2022-10-03 RX ADMIN — CARVEDILOL 3.12 MG: 3.12 TABLET, FILM COATED ORAL at 08:19

## 2022-10-03 RX ADMIN — ACETAMINOPHEN 650 MG: 325 TABLET, FILM COATED ORAL at 12:21

## 2022-10-03 RX ADMIN — SPIRONOLACTONE 25 MG: 25 TABLET ORAL at 08:19

## 2022-10-03 RX ADMIN — INSULIN LISPRO 3 UNITS: 100 INJECTION, SOLUTION INTRAVENOUS; SUBCUTANEOUS at 12:21

## 2022-10-03 RX ADMIN — ACETAMINOPHEN 650 MG: 325 TABLET, FILM COATED ORAL at 22:36

## 2022-10-03 RX ADMIN — OXYBUTYNIN CHLORIDE 5 MG: 5 TABLET, EXTENDED RELEASE ORAL at 08:19

## 2022-10-03 NOTE — PLAN OF CARE
Goal Outcome Evaluation:  Plan of Care Reviewed With: patient  Progress: no change   SLP treatment completed. Will continue to address dysphagia & cognitive-communication during treatment. Please see note for further details and recommendations.

## 2022-10-03 NOTE — PROGRESS NOTES
[facility]  PROGRESS NOTE    Patient Name: Catherine Carroll  : 1950  MRN: [id number]    Date of Admission: 2022  Primary Care Physician: Sujit Batista MD    Subjective   Subjective     CC: Follow-up CVA, Takotsubo cardiomyopathy    HPI: No acute events overnight, patient said that she is doing okay, did complain of some right hip discomfort.  Slowly regaining left-sided strength    ROS:  Gen- No fevers, chills  CV- No chest pain, palpitations  Resp- No cough, dyspnea  GI- No N/V/D, abd pain      Objective   Objective     Vital Signs:   Temp:  [96.8 °F (36 °C)-98.8 °F (37.1 °C)] 98.8 °F (37.1 °C)  Heart Rate:  [69-85] 72  Resp:  [16-18] 18  BP: (100-128)/(46-65) 121/50     Physical Exam:  Constitutional: Elderly female, in no acute distress, awake, alert, anxious  HENT: NCAT, mucous membranes moist  Respiratory: Clear to auscultation bilaterally, respiratory effort normal   Cardiovascular: RRR, no murmurs, rubs, or gallops  Gastrointestinal: Positive bowel sounds, soft, nontender, nondistended  Musculoskeletal: No bilateral ankle edema  Psychiatric: Appropriate affect, cooperative  Neurologic: Oriented x 3, left-sided weakness, facial droop  Skin: No rashes      Results Reviewed:  LAB RESULTS:      Lab 10/02/22  0749 10/01/22  0644 22  0945   WBC 7.96 7.40 6.76   HEMOGLOBIN 10.2* 10.6* 10.6*   HEMATOCRIT 32.5* 34.5 31.7*   PLATELETS 251 259 211   NEUTROS ABS  --  4.19  --    IMMATURE GRANS (ABS)  --  0.02  --    LYMPHS ABS  --  1.94  --    MONOS ABS  --  1.13*  --    EOS ABS  --  0.09  --    MCV 93.4 93.5 89.3         Lab 10/02/22  0905 10/01/22  0644 22  1054 22  0945 22  0916   SODIUM 141 139 140 141 139   POTASSIUM 4.8 4.6 4.6 5.2 4.7   CHLORIDE 104 105 104 103 104   CO2 27.0 27.0 29.0 26.0 26.0   ANION GAP 10.0 7.0 7.0 12.0 9.0   BUN 27* 27* 29* 29* 34*   CREATININE 1.74* 1.70* 1.34* 1.36* 1.35*   EGFR 30.9* 31.7* 42.2* 41.5*  41.8*   GLUCOSE 172* 133* 134* 108* 115*   CALCIUM 8.6 8.3* 8.6 8.4* 8.5*                         Brief Urine Lab Results     None          Microbiology Results Abnormal     None          No radiology results from the last 24 hrs    Results for orders placed during the hospital encounter of 09/21/22    Adult Transthoracic Echo Complete W/ Cont if Necessary Per Protocol (With Agitated Saline)    Interpretation Summary  · Estimated left ventricular EF = 30%  · Left ventricular wall thickness is consistent with mild concentric hypertrophy.  · The findings are consistent with stress-induced (Takotsubo) cardiomyopathy.  · The cardiac valves are anatomically and functionally normal.  · Saline test results are negative.      I have reviewed the medications:  Scheduled Meds:aspirin, 325 mg, Oral, Daily   Or  aspirin, 300 mg, Rectal, Daily  atorvastatin, 80 mg, Oral, Nightly  buPROPion XL, 300 mg, Oral, Daily  carvedilol, 3.125 mg, Oral, BID With Meals  clopidogrel, 75 mg, Oral, Q24H  docusate sodium, 100 mg, Oral, BID  famotidine, 20 mg, Oral, Nightly  insulin lispro, 0-7 Units, Subcutaneous, 4x Daily With Meals & Nightly  oxybutynin XL, 5 mg, Oral, Daily  sacubitril-valsartan, 1 tablet, Oral, Q12H  senna-docusate sodium, 2 tablet, Oral, BID  sodium chloride, 10 mL, Intravenous, Q12H  spironolactone, 25 mg, Oral, Daily      Continuous Infusions:   PRN Meds:.•  acetaminophen  •  senna-docusate sodium **AND** polyethylene glycol **AND** bisacodyl **AND** bisacodyl  •  carbamide peroxide  •  dextrose  •  dextrose  •  glucagon (human recombinant)  •  ondansetron  •  potassium & sodium phosphates **OR** potassium & sodium phosphates  •  sodium chloride  •  traMADol    Assessment & Plan   Assessment & Plan     Active Hospital Problems    Diagnosis  POA   • **Acute ischemic right MCA stroke (HCC) [I63.511]  Yes   • Dysphagia, unspecified type [R13.10]  Yes   • Acute ischemic stroke (HCC) [I63.9]  Yes   • S/p left hip fracture  [Z87.81]  Not Applicable   • DM (diabetes mellitus), type 2 (HCC) [E11.9]  Yes      Resolved Hospital Problems   No resolved problems to display.        Brief Hospital Course to date:  Catherine Carroll is a 72 y.o. female with diabetes, recent left hip fracture, CKD, hypertension, sleep apnea, dyslipidemia, generalized anxiety disorder, depression, anemia, multiple falls, and GERD who presented to [facility] on 9/21/2022 after transfer from [facility] for acute CVA management.  Patient apparently was admitted there on September 19 with left hip/pelvic fracture after sustaining a fall.  Orthopedics evaluated the patient and it was felt to be inoperable fracture.  The following morning, patient was found to have development of acute encephalopathy, facial droop, aphasia and left-sided weakness.  Code stroke was called and patient was evaluated.  Patient was given IV tPA..  Further work-up including CTA showed filling defect at the distal right M1 branch extending into the M2 branch.  Subacute infarction noted in the right MCA territory.  CT head perfusion showed right MCA territory CVA with associated penumbra.  Case was discussed with interventional neurology and patient was brought in emergently for thrombectomy.  Patient underwent cerebral angiogram where she was found to have abrupt occlusion of the dominant superior division M2 branch of the right MCA consistent with acute thromboembolic event.  Mechanical thrombectomy was done with the return of normal flow to the right cerebral hemisphere.  Post procedure patient was admitted to ICU for closer monitoring. Patient transferred from ICU to telemetry 9/24. CM is following for SNF placement.      This patient's problems and plans were partially entered by my partner and updated as appropriate by me 10/03/22.      Acute ischemic stroke, right MCA, superior division, likely etiology thromboembolism   -S/P tPA at OSH and mechanical  thrombectomy at [facility]   -MRI could not be done due to bladder stimulator in place  -Continue DAPT, statin  -Cardiac monitor at [facility]-may not be feasible with life vest  -PT/OT/SLP  -Follow-up stroke clinic 1 month      Takotsubo's Cardiomyopathy   Unstable Angina  Abnormal EKG  HLD  -Cardiology following, [facility] showed no significant ischemic heart disease  -Continue lower dose of Coreg, 3.125mg BID   -Continue Aldactone 25 mg daily.  -Started Entresto per cardiology  -Life vest prior to discharge  -Follow up with Cardiology in 6 weeks.      Severe oral, Moderate pharyngeal Dysphagia   -SLP following  -Started on dysphagia diet   --s/p mbs 9/29.  Recs to continue current diet mechanical soft, no mixed consistencies and nectar thickened liquids.     Hypertension  -BP currently controlled  -continue Coreg, Entresto, Aldactone      Well-controlled type 2 diabetes A1c 5.7%   -FSBG's reviewed and appropriate continue basal/bolus and SSI     S/P L hip Fx  Multiple Falls   -Non-operative management per outside Ortho eval   -WBAT, PT/OT     CKD  -Creatinine at baseline, ~1.3, trending up  -Monitor with initiation of Entresto, Aldactone   --encouraged oral hydration     Anxiety/Depression  -Continue Wellbutrin     GERD  -Continue Pepcid     Expected Discharge Location and Transportation: SNF with transition to LTC  Expected Discharge Date: 10/4/2022    DVT prophylaxis:  Mechanical DVT prophylaxis orders are present.     AM-PAC 6 Clicks Score (PT): 9 (10/02/22 2042)    CODE STATUS:   Code Status and Medical Interventions:   Ordered at: 09/28/22 1928     Code Status (Patient has no pulse and is not breathing):    CPR (Attempt to Resuscitate)     Medical Interventions (Patient has pulse or is breathing):    Full Support     Release to patient:    Routine Release       Lisa Villegas MD  10/03/22

## 2022-10-03 NOTE — CASE MANAGEMENT/SOCIAL WORK
Continued Stay Note   [place]     Patient Name: Catherine Carroll  MRN: [id number]  Today's Date: 10/3/2022    Admit Date: 9/21/2022    Plan: Longterm care bed   Discharge Plan     Row Name 10/03/22 1155       Plan    Plan Longterm care bed    Patient/Family in Agreement with Plan yes    Plan Comments [facility] accepted patient for long-term care bed. They are starting insurance approval. Plan for Wednesday. Will schedule ambulance for Wednesday afternoon CM will follow.    Update- Ambulance for 10/5/22 at 1345. I called Cristal Del Angel and updated her. Updated patient.     Final Discharge Disposition Code 04 - intermediate care facility               Discharge Codes    No documentation.               Expected Discharge Date and Time     Expected Discharge Date Expected Discharge Time    Oct 4, 2022             Beth Lombardo RN

## 2022-10-03 NOTE — PLAN OF CARE
Goal Outcome Evaluation:      Patient is on room air. Running 60's on monitor.  Pleasant for me, but threw her phone at one nurse and then again at another.  Educated patient that we are here to help her.  She had a very large pasty bm.

## 2022-10-03 NOTE — THERAPY PROGRESS REPORT/RE-CERT
Patient Name: Catherine Carroll  : 1950    MRN: [id number]                              Today's Date: 10/3/2022       Admit Date: 2022    Visit Dx:     ICD-10-CM ICD-9-CM   1. Oropharyngeal dysphagia  R13.12 787.22   2. Dysarthria  R47.1 784.51   3. Cognitive communication deficit  R41.841 799.52     Patient Active Problem List   Diagnosis   • Chronic kidney disease   • Closed right hip fracture- Comminuted intertrochanteric and supratrochanteric fracture of femur    • Closed fracture of distal end of right radius- comminuted    • Fall down stairs   • DM (diabetes mellitus), type 2 ([facility])   • Depression   • Anemia, blood loss   • Hypokalemia   • Closed left hip fracture ([facility])   • Essential hypertension   • RAVEN (obstructive sleep apnea)   • Hyperlipidemia   • Generalized anxiety disorder   • Fall   • Severe obesity (BMI 35.0-35.9 with comorbidity) ([facility])   • S/p left hip fracture   • Acute ischemic right MCA stroke ([facility])   • Dysphagia, unspecified type   • Acute ischemic stroke ([facility])     Past Medical History:   Diagnosis Date   • Acid reflux    • Alopecia     severe   • Anxiety    • Chronic kidney disease     NKF classification) See under renal insuff - renal MD has limited her to 50 gms prot/day - this will signif affect her ability to be successful with WLS and may incr her risk of leak (gerardo as her prealb already low) - she still adamantly wishes to proceed.  Also has proteinuria   • Depression    • Diabetes mellitus ([facility])    • Dyspepsia    • Dyspnea on exertion    • Fatigue    • Hyperlipidemia    • Hypertension    • Hypoalbuminemia     3.1   • Insomnia    • Joint pain    • Morbid obesity ([facility])     (SUPER)   • Nephrolithiasis    • Obstructive sleep apnea     prior to her orig Band in  she had RAVEN on CPAP (309 lbs).     • Proteinuria    • PTSD (post-traumatic stress disorder)    • Renal insufficiency     related to nephrolithiasis.  GFR 45, (L) 40%, (R) 60%,  follows w/ Dr. Bunch   • Type 2 diabetes  mellitus ([facility])     dx 1997, on insulin 10+ yrs, A1c 9.5 5/14/15.     • Urolithiasis      Past Surgical History:   Procedure Laterality Date   • BREAST LUMPECTOMY Right 2010    benign cyst   • CARDIAC CATHETERIZATION N/A 9/27/2022    Procedure: LEFT HEART CATH;  Surgeon: Mitch Underwood MD;  Location:  [facility];  Service: Cardiovascular;  Laterality: N/A;   • CHOLECYSTECTOMY     • CYSTOSCOPY     • CYSTOSCOPY W/ LITHOLAPAXY / EHL     • GASTRIC BANDING REMOVAL  11/2010    by [facility] for intolerance    • GASTRIC PORT CHANGE      lapband port reposition and shortening of lapband tubing after a port flip in 11/2007 by [facility]    • GASTRIC SLEEVE LAPAROSCOPIC      LAGB REG/ HHR 2/2007 by [facility]   • HIP TROCHANTERIC NAILING WITH INTRAMEDULLARY HIP SCREW Right 9/28/2016    Procedure: HIP TROCANTERIC NAILING WITH INTRAMEDULLARY HIP SCREW;  Surgeon: Deshawn Solis MD;  Location:  [facility];  Service:    • HIP TROCHANTERIC NAILING WITH INTRAMEDULLARY HIP SCREW Left 1/23/2019    Procedure: HIP TROCANTERIC NAILING LEFT;  Surgeon: Luther Thurston MD;  Location:  [facility];  Service: Orthopedics   • INTERVENTIONAL RADIOLOGY PROCEDURE N/A 9/21/2022    Procedure: IR MECHANICAL THROMBECTOMY - PRIMARY;  Surgeon: Randy Hair MD;  Location:  [facility];  Service: Interventional Radiology;  Laterality: N/A;   • KIDNEY SURGERY Left 2003    kidney surgery for embedded stent removal; multiple kidney stone removals and stent placements     • LAPAROSCOPIC CHOLECYSTECTOMY  2003    for gallstones   • LAPAROSCOPIC GASTRIC BANDING      Adjustable Gastric Band s/p LAGB APL/ HHR 8/2011 by [facility]    • SHOULDER SURGERY Left 12/27/2013   • TONSILLECTOMY  1957   • ULNA/RADIUS CLOSED REDUCTION Right 9/28/2016    Procedure: ULNA/RADIUS CLOSED REDUCTION;  Surgeon: Deshawn Solis MD;  Location:  [facility];  Service:       General Information     Row Name 10/03/22 1404          OT Time and Intention    Document Type  progress note/recertification  -     Mode of Treatment individual therapy;occupational therapy  -     Row Name 10/03/22 1404          General Information    Patient Profile Reviewed yes  -BREANA     Existing Precautions/Restrictions fall  L LE WBAT, L sided weakness from CVA  -BREANA     Barriers to Rehab medically complex;previous functional deficit;cognitive status;visual deficit  -     Row Name 10/03/22 1404          Cognition    Orientation Status (Cognition) oriented to;person;time  pt. knew she was at a hospital, but thought it was [place]  -     Row Name 10/03/22 1404          Safety Issues, Functional Mobility    Safety Issues Affecting Function (Mobility) sequencing abilities  -     Impairments Affecting Function (Mobility) cognition;coordination;motor control;motor planning;strength;range of motion (ROM);postural/trunk control;endurance/activity tolerance;balance  -BREANA     Cognitive Impairments, Mobility Safety/Performance insight into deficits/self-awareness;judgment;problem-solving/reasoning;sequencing abilities  -           User Key  (r) = Recorded By, (t) = Taken By, (c) = Cosigned By    Initials Name Provider Type    Tia Arthur OT Occupational Therapist                 Mobility/ADL's     Row Name 10/03/22 1406          Bed Mobility    Rolling Left [place] (Bed Mobility) moderate assist (50% patient effort);2 person assist;verbal cues;nonverbal cues (demo/gesture)  for bed pan placement  -     Bed Mobility, Safety Issues decreased use of arms for pushing/pulling;decreased use of legs for bridging/pushing;impaired trunk control for bed mobility  -     Assistive Device (Bed Mobility) bed rails;draw sheet  -     Comment, (Bed Mobility) pt. with cues rolled to L for bed pan placement  -     Row Name 10/03/22 1406          Transfers    Comment, (Transfers) deferred due to having to use bed pan, recommend pt. ask nurse to use lift for mvmt to recliner with lift post bed pan use  -BREANA      Row Name 10/03/22 1406          Activities of Daily Living    BADL Assessment/Intervention grooming;upper body dressing  -     Row Name 10/03/22 1406          Grooming Assessment/Training    [place] Level (Grooming) hair care, combing/brushing;set up;oral care regimen;standby assist;verbal cues;nonverbal cues (demo/gesture)  -BREANA     Position (Grooming) sitting up in bed  -BREANA     Comment, (Grooming) pt. needed cues to sequence oral care in using LUE to hold toothpaste to open and close and hold toothbrush to load toothpaste on brush  -     Row Name 10/03/22 1406          Upper Body Dressing Assessment/Training    [place] Level (Upper Body Dressing) doff;don  -BREANA     Position (Upper Body Dressing) sitting up in bed  -BREANA     Comment, (Upper Body Dressing) pt. doffed gown mod A and donned max A, pt. difficulty following cues and pausing actions long enough to sequence task  -           User Key  (r) = Recorded By, (t) = Taken By, (c) = Cosigned By    Initials Name Provider Type    Tia Arthur, OT Occupational Therapist               Obj/Interventions     Row Name 10/03/22 1409          Sensory Interventions    Comment, Sensory Intervention pt. states she cannot feel L side as much as R side, pt. may benefit from sensory stimulation education  -     Row Name 10/03/22 1409          Vision Assessment/Intervention    Vision Assessment Comment pt. with cues would look to left for OT to demonstrate next TE or when cued to watch LUE mvmt.  -     Row Name 10/03/22 1409          Shoulder (Therapeutic Exercise)    Shoulder AROM (Therapeutic Exercise) right;flexion;extension;aBduction;aDduction;horizontal aBduction/aDduction;scapular elevation;external rotation;supine;10 repetitions  -     Shoulder AAROM (Therapeutic Exercise) left;flexion;extension;aBduction;aDduction;external rotation;internal rotation;horizontal aBduction/aDduction;scapular retraction;10 repetitions  periods of AROM  -BREANA     [place]  Name 10/03/22 1409          Elbow/Forearm (Therapeutic Exercise)    Elbow/Forearm (Therapeutic Exercise) AROM (active range of motion)  -     Elbow/Forearm AROM (Therapeutic Exercise) right;flexion;extension;supination;pronation;supine;10 repetitions  -     Elbow/Forearm AAROM (Therapeutic Exercise) left;flexion;extension;supination;pronation;supine;10 repetitions  -     Row Name 10/03/22 1409          Wrist (Therapeutic Exercise)    Wrist (Therapeutic Exercise) AROM (active range of motion)  -     Wrist AROM (Therapeutic Exercise) right;flexion;extension;10 repetitions  -     Wrist AAROM (Therapeutic Exercise) left;flexion;extension;10 repetitions  -     Row Name 10/03/22 1409          Hand (Therapeutic Exercise)    Hand (Therapeutic Exercise) AROM (active range of motion)  -     Hand AROM/AAROM (Therapeutic Exercise) bilateral;AROM (active range of motion);AAROM (active assistive range of motion);finger flexion;finger extension;finger aBduction;finger aDduction;thumb opposition;10 repetitions  abd/add only on left hand, AROM R, AAROM L  -     Row Name 10/03/22 1409          Motor Skills    Motor Skills motor control/coordination interventions  -     Coordination left;upper extremity;bimanual skills;dysdiadochokinesia;moderate impairment;severe impairment  -     Motor Control/Coordination Interventions occupation/activity based treatment;repetitive task training;therapeutic exercise/ROM  BUE coordinated mvmt. together, placing items in and out of bucket LUE, grooming, dressing tasks  -           User Key  (r) = Recorded By, (t) = Taken By, (c) = Cosigned By    Initials Name Provider Type    Tia Arthur, PERICO Occupational Therapist               Goals/Plan     Row Name 10/03/22 1418          Transfer Goal 1 (OT)    Progress/Outcome (Transfer Goal 1, OT) goal ongoing  -     Row Name 10/03/22 1418          Dressing Goal 1 (OT)    Progress/Outcome (Dressing Goal 1, OT) continuing progress  toward goal  -BREANA     Row Name 10/03/22 1418          Grooming Goal 1 (OT)    Progress/Outcome (Grooming Goal 1, OT) goal partially met  -BREANA           User Key  (r) = Recorded By, (t) = Taken By, (c) = Cosigned By    Initials Name Provider Type    Tia Arthur, OT Occupational Therapist               Clinical Impression     Row Name 10/03/22 1413          Pain Assessment    Pretreatment Pain Rating 0/10 - no pain  -BREANA     Posttreatment Pain Rating 0/10 - no pain  -BREANA     Row Name 10/03/22 1413          Plan of Care Review    Plan of Care Reviewed With patient  -BREANA     Progress improving  -BREANA     Outcome Evaluation Pt. with good participation BUE TE AROM, strength and coordination.  Good participation in BUE grooming and dressing task with compensatory training. Pt. with difficulty using BUE's at same time. Pt. continues to report diminished sensation LUE and may benefit from sensory stimulation education next session.  Pt. rolled mod of 2 L for bed pain placement.  Pt. attending to L and turning eyes to L with cueing. Continue OT POC.  -BREANA     Row Name 10/03/22 1413          Therapy Assessment/Plan (OT)    Therapy Frequency (OT) daily  -BREANA     Row Name 10/03/22 1413          Therapy Plan Review/Discharge Plan (OT)    Anticipated Discharge Disposition (OT) inpatient rehabilitation facility  -     Row Name 10/03/22 1413          Vital Signs    Pre Systolic BP Rehab 117  -BREANA     Pre Treatment Diastolic BP 60  -BRENAA     Post Systolic BP Rehab 111  -BREANA     Post Treatment Diastolic BP 80  -BREANA     Pretreatment Heart Rate (beats/min) 77  -BREANA     Posttreatment Heart Rate (beats/min) 81  -BREANA     Pre SpO2 (%) 94  -BREANA     O2 Delivery Pre Treatment room air  -BREANA     Post SpO2 (%) 95  -BREANA     O2 Delivery Post Treatment room air  -BREANA     Pre Patient Position Supine  -BREANA     Intra Patient Position Supine  -BREANA     Post Patient Position Supine  -BREANA     Row Name 10/03/22 1413          Positioning and Restraints    Pre-Treatment  Position in bed  -BREANA     Post Treatment Position bed  -BREANA     In Bed supine;call light within reach;encouraged to call for assist;exit alarm on;side rails up x2  on bed pan  -           User Key  (r) = Recorded By, (t) = Taken By, (c) = Cosigned By    Initials Name Provider Type    Tia Arthur, OT Occupational Therapist               Outcome Measures     Row Name 10/03/22 1419          How much help from another is currently needed...    Putting on and taking off regular lower body clothing? 1  -BREANA     Bathing (including washing, rinsing, and drying) 2  -BREANA     Toileting (which includes using toilet bed pan or urinal) 1  -BREANA     Putting on and taking off regular upper body clothing 2  -BREANA     Taking care of personal grooming (such as brushing teeth) 3  -BREANA     Eating meals 3  -BREANA     AM-PAC 6 Clicks Score (OT) 12  -     Row Name 10/03/22 0815          How much help from another person do you currently need...    Turning from your back to your side while in flat bed without using bedrails? 2  -LC     Moving from lying on back to sitting on the side of a flat bed without bedrails? 2  -LC     Moving to and from a bed to a chair (including a wheelchair)? 1  -LC     Standing up from a chair using your arms (e.g., wheelchair, bedside chair)? 2  -LC     Climbing 3-5 steps with a railing? 1  -LC     To walk in hospital room? 1  -LC     AM-PAC 6 Clicks Score (PT) 9  -LC     Highest level of mobility 3 --> Sat at edge of bed  -     Row Name 10/03/22 1419          Modified Otero Scale    Pre-Stroke Modified Haydee Scale 6 - Unable to determine (UTD) from the medical record documentation  -BREANA     Modified Otero Scale 4 - Moderately severe disability.  Unable to walk without assistance, and unable to attend to own bodily needs without assistance.  -     Row Name 10/03/22 1419          Functional Assessment    Outcome Measure Options AM-PAC 6 Clicks Daily Activity (OT)  -           User Key  (r) = Recorded By,  (t) = Taken By, (c) = Cosigned By    Initials Name Provider Type    Tia Arthur OT Occupational Therapist    Rae Rojas, RN Registered Nurse                Occupational Therapy Education                 Title: PT OT SLP Therapies (In Progress)     Topic: Occupational Therapy (In Progress)     Point: ADL training (In Progress)     Description:   Instruct learner(s) on proper safety adaptation and remediation techniques during self care or transfers.   Instruct in proper use of assistive devices.              Learning Progress Summary           Patient Acceptance, E,D, NR by BREANA at 10/3/2022 1419    Comment: bed mobility, grooming and dressing coordination and sequencing, UE TE coordination, ROM and strengthening, re-orientation to place, benefit of repetitive use LUE    Acceptance, E,TB, NR,NL by  at 9/27/2022 1029    Acceptance, E,TB,D, NR by  at 9/26/2022 1525    Acceptance, E, NR by  at 9/24/2022 0834    Acceptance, E, NR by  at 9/22/2022 1505                   Point: Home exercise program (In Progress)     Description:   Instruct learner(s) on appropriate technique for monitoring, assisting and/or progressing therapeutic exercises/activities.              Learning Progress Summary           Patient Acceptance, E,D, NR by BREANA at 10/3/2022 1419    Comment: bed mobility, grooming and dressing coordination and sequencing, UE TE coordination, ROM and strengthening, re-orientation to place, benefit of repetitive use LUE    Acceptance, E,TB, NR,NL by  at 9/27/2022 1029    Acceptance, E,TB,D, NR by  at 9/26/2022 1525    Acceptance, E, NR by  at 9/24/2022 0834                   Point: Precautions (In Progress)     Description:   Instruct learner(s) on prescribed precautions during self-care and functional transfers.              Learning Progress Summary           Patient Acceptance, E,TB, NR,NL by  at 9/27/2022 1029    Acceptance, E,TB,D, NR by  at 9/26/2022 1525    Acceptance, E, NR by CS at  9/22/2022 1505                   Point: Body mechanics (In Progress)     Description:   Instruct learner(s) on proper positioning and spine alignment during self-care, functional mobility activities and/or exercises.              Learning Progress Summary           Patient Acceptance, E,TB, NR,NL by  at 9/27/2022 1029    Acceptance, E,TB,D, NR by  at 9/26/2022 1525    Acceptance, E, NR by  at 9/22/2022 1505                               User Key     Initials Effective Dates Name Provider Type Discipline     06/16/21 -  Tia Johnson, OT Occupational Therapist OT     06/16/21 -  Kimberly Bonner, OT Occupational Therapist OT     09/02/21 -  Denisse Mcduffie, OT Occupational Therapist OT     06/16/21 -  Thao Anderson, OT Occupational Therapist OT              OT Recommendation and Plan  Therapy Frequency (OT): daily  Plan of Care Review  Plan of Care Reviewed With: patient  Progress: improving  Outcome Evaluation: Pt. with good participation BUE TE AROM, strength and coordination.  Good participation in BUE grooming and dressing task with compensatory training. Pt. with difficulty using BUE's at same time. Pt. continues to report diminished sensation LUE and may benefit from sensory stimulation education next session.  Pt. rolled mod of 2 L for bed pain placement.  Pt. attending to L and turning eyes to L with cueing. Continue OT POC.     Time Calculation:    Time Calculation- OT     Row Name 10/03/22 1421             Time Calculation- OT    OT Start Time 1314  -BREANA      OT Received On 10/03/22  -BREANA      OT Goal Re-Cert Due Date 10/13/22  -BREANA              Timed Charges    31001 - OT Therapeutic Exercise Minutes 24  -BREANA      75961 - OT Therapeutic Activity Minutes 2  -BREANA      57138 - OT Self Care/Mgmt Minutes 23  -BREANA              Total Minutes    Timed Charges Total Minutes 49  -BREANA       Total Minutes 49  -BREANA            User Key  (r) = Recorded By, (t) = Taken By, (c) = Cosigned By    Initials Name  Provider Type    BREANA Tia Johnson, OT Occupational Therapist              Therapy Charges for Today     Code Description Service Date Service Provider Modifiers Qty    02655041463 HC OT THER PROC EA 15 MIN 10/3/2022 Tia Johnson, OT GO 2    [id number] HC OT SELF CARE/MGMT/TRAIN EA 15 MIN 10/3/2022 Tia Johnson, OT GO 1               Tia Johnson OT  10/3/2022

## 2022-10-03 NOTE — DISCHARGE PLACEMENT REQUEST
"Duke Bernal (72 y.o. Female)     CM, Beth Karo, [phone]    Referral for SNF to LTC            Date of Birth   1950    Social Security Number       Address   [address] [facility] [address]    Home Phone   [phone]    MRN   [id number]       Sabianist   Religion    Marital Status   Single                            Admission Date   9/21/22    Admission Type   Urgent    Admitting Provider   Lisa Villegas MD    Attending Provider   Lisa Villegas MD    Department, Room/Bed   [facility] 6B, N639/1       Discharge Date       Discharge Disposition       Discharge Destination                               Attending Provider: Lisa Villegas MD    Allergies: Abilify [Aripiprazole], Aminoglycosides, Nsaids    Isolation: None   Infection: None   Code Status: CPR   Advance Care Planning Activity    Ht: 165 cm (64.96\")   Wt: 79.4 kg (175 lb)    Admission Cmt: None   Principal Problem: Acute ischemic right MCA stroke (HCC) [I63.511]                 Active Insurance as of 9/21/2022     Primary Coverage     Payor Plan Insurance Group Employer/Plan Group    HUMANA MEDICARE REPLACEMENT HUMANA MEDICARE REPLACEMENT [id number]     Payor Plan Address Payor Plan Phone Number Payor Plan Fax Number Effective Dates    [address] [address] [phone]  2/1/2021 - None Entered    [facility] [id number]       Subscriber Name Subscriber Birth Date Member ID       DUKE BERNAL 1950 [id number]                 Emergency Contacts      (Rel.) Home Phone Work Phone Mobile Phone    DESHAWN RAMIREZ (Sister) [phone] -- --    Babs Howlel (Ok to give info to per Sister) (Friend) -- -- [phone]               History & Physical      Lul Keane MD at 09/21/22 1137          Intensive Care Admission Note     Acute ischemic right MCA stroke (HCC)    History of Present Illness     Ms. Bernal is a 72-year-old female with past medical history of diabetes, left hip fracture, " CKD, hypertension, RAVEN, hyperlipidemia, generalized anxiety disorder, closed right hip fracture, depression, anemia, hypokalemia, multiple falls, GERD, hypoalbuminemia, obesity who presents to the ICU status post thrombectomy with Dr. Hair for right MCA stroke.      According to documentation patient initially presented to [facility] on 9/19/2022 after suffering a fall.  According to reports she was lifting up in her recliner to get something underneath it when she fell backwards and hit her left hip.  After the fall she had excruciating left hip pain with no longer able to get up off the floor or stand.  In the ED lab work was unremarkable and left hip x-ray showed irregular left superior pubic ramus and acetabulum.  This was ultimately determined to be nonoperable.  She was admitted to the hospital for evaluation and management.    On the morning of September 21 patient had acute onset left-sided paralysis and neglect.  According to reports she had been evaluated around 3 AM and had symmetric smile and was moving both arms with fluent speech.  Around 6 AM nursing staff went to check on her and the patient was found with a left facial droop, difficulty speaking, and inability to move her left arm.  A code stroke was called and neurology arrived at the bedside for evaluation and management.  NIH 25.  Given the NIH with acute onset differential included high possibility of acute ischemic stroke, migraine, cerebral mass lesion not seen on the previous CT head.  Or seizure with Chad's paralysis.  With the acuteness and presentation, acute ischemic stroke was felt to be the highest possibility.  Risks and benefits of IV tPA were presented to the patient but she was unable to stay awake long enough to make the decision.  At that time emergency contacts were unsuccessfully contacted.  Therefore neurologist felt it was important to go ahead and administer tPA.      Follow-up CTA showed filling deficit at the  distal right M1 branch extending into the M2 branch.  Subacute infarction in the right MCA territory.    CT head perfusion showed right MCA territory CVA with associated penumbra.    Given the results of her imaging, as well as acute onset and NIH, it was felt the patient could benefit from possible thrombectomy.  She was transferred to [facility] under the care of Dr. Hair.  She emergently went to the Cath Lab where a thrombectomy was performed.    Post procedure she presents to the ICU for management.    Problem List, Surgical History, Family, Social History, and ROS     Patient Active Problem List    Diagnosis    • *Acute ischemic right MCA stroke (HCC) [I63.511]    • S/p left hip fracture [Z87.81]    • Closed left hip fracture ([facility]) [S72.002A]    • Essential hypertension [I10]    • RAVEN (obstructive sleep apnea) [G47.33]    • Hyperlipidemia [E78.5]    • Generalized anxiety disorder [F41.1]    • Fall [W19.XXXA]    • Severe obesity (BMI 35.0-35.9 with comorbidity) (Formerly [facility]) [E66.01, Z68.35]    • Anemia, blood loss [D50.0]    • Hypokalemia [E87.6]    • Chronic kidney disease [N18.9]    • Closed right hip fracture- Comminuted intertrochanteric and supratrochanteric fracture of femur  [S72.001A]    • Closed fracture of distal end of right radius- comminuted  [S52.501A]    • Fall down stairs [W10.8XXA]    • DM (diabetes mellitus), type 2 ([facility]) [E11.9]    • Depression [F32.A]      Past Surgical History:   Procedure Laterality Date   • BREAST LUMPECTOMY Right 2010    benign cyst   • CHOLECYSTECTOMY     • CYSTOSCOPY     • CYSTOSCOPY W/ LITHOLAPAXY / EHL     • GASTRIC BANDING REMOVAL  11/2010    by MIYA for intolerance    • GASTRIC PORT CHANGE      lapband port reposition and shortening of lapband tubing after a port flip in 11/2007 by MIYA    • GASTRIC SLEEVE LAPAROSCOPIC      LAGB REG/ HHR 2/2007 by MIYA   • HIP TROCHANTERIC NAILING WITH INTRAMEDULLARY HIP SCREW Right 9/28/2016    Procedure: HIP TROCANTERIC  NAILING WITH INTRAMEDULLARY HIP SCREW;  Surgeon: Deshawn Solis MD;  Location:  [facility];  Service:    • HIP TROCHANTERIC NAILING WITH INTRAMEDULLARY HIP SCREW Left 1/23/2019    Procedure: HIP TROCANTERIC NAILING LEFT;  Surgeon: Luther Thurston MD;  Location:  [facility];  Service: Orthopedics   • KIDNEY SURGERY Left 2003    kidney surgery for embedded stent removal; multiple kidney stone removals and stent placements     • LAPAROSCOPIC CHOLECYSTECTOMY  2003    for gallstones   • LAPAROSCOPIC GASTRIC BANDING      Adjustable Gastric Band s/p LAGB APL/ HHR 8/2011 by JSO    • SHOULDER SURGERY Left 12/27/2013   • TONSILLECTOMY  1957   • ULNA/RADIUS CLOSED REDUCTION Right 9/28/2016    Procedure: ULNA/RADIUS CLOSED REDUCTION;  Surgeon: Deshawn Solis MD;  Location:  [facility];  Service:        Allergies   Allergen Reactions   • Abilify [Aripiprazole] Other (See Comments)     Doesn't remember reaction - kidney doctor stopped medication   • Aminoglycosides    • Nsaids Other (See Comments)     Can't take due to kidney function     No current facility-administered medications on file prior to encounter.     Current Outpatient Medications on File Prior to Encounter   Medication Sig   • acetaminophen (TYLENOL) 325 MG tablet Take 2 tablets by mouth Every 4 (Four) Hours As Needed for Mild Pain .   • atorvastatin (LIPITOR) 40 MG tablet Take 40 mg by mouth daily.   • buPROPion XL (WELLBUTRIN XL) 150 MG 24 hr tablet Take 450 mg by mouth Every Morning. Patient takes 3 150mg tablets in the morning   • cholecalciferol (VITAMIN D3) 1000 units tablet Take 1,000 Units by mouth Daily.   • docusate sodium 100 MG capsule Take 100 mg by mouth 2 (Two) Times a Day.   • enoxaparin (LOVENOX) 40 MG/0.4ML solution syringe Inject 0.4 mL under the skin into the appropriate area as directed Daily.   • FLUoxetine (PROzac) 20 MG capsule Take 40 mg by mouth Every Morning.   • HYDROcodone-acetaminophen (NORCO) 7.5-325 MG per tablet Take 1 tablet by  "mouth Every 6 (Six) Hours As Needed for Moderate Pain .   • hydrOXYzine (VISTARIL) 25 MG capsule Take 100 mg by mouth Every Night. Patient takes 4 25mg tablets at bedtime    • insulin glargine (LANTUS) 100 UNIT/ML injection Inject 7 Units under the skin into the appropriate area as directed Every Night.   • lisinopril (PRINIVIL,ZESTRIL) 5 MG tablet Take 1 tablet by mouth Daily.   • Multiple Vitamins-Minerals (CENTRUM SILVER PO) Take 1 tablet by mouth Daily.   • polyethylene glycol (MIRALAX) pack packet Take 17 g by mouth Daily.   • traZODone (DESYREL) 150 MG tablet Take 300 mg by mouth Every Night.     MEDICATION LIST AND ALLERGIES REVIEWED.    No family history on file.  Social History     Tobacco Use   • Smoking status: Never Smoker   • Smokeless tobacco: Never Used   Substance Use Topics   • Alcohol use: No   • Drug use: No     Social History     Social History Narrative    Currently resides in [place], [place]  Lives alone. Ambulates with cane at baseline.      FAMILY AND SOCIAL HISTORY REVIEWED.    Review of Systems  Unable to obtain review of system    Physical Exam and Clinical Information   BP 91/53 (BP Location: Left arm, Patient Position: Lying)   Pulse 101   Temp 97.2 °F (36.2 °C) (Axillary)   Resp 16   Ht 165 cm (64.96\")   Wt 80 kg (176 lb 5.9 oz)   SpO2 90%   BMI 29.38 kg/m²   Physical Exam  General Appearance: Sleepy, lethargic, in no acute distress  Head:    Atraumatic, Normocephalic, without obvious abnormality, Pupils reactive & symmetrical B/L.  Neck:   Supple   Lungs:   B/L Breath sounds present with decreased breath sounds on bases, no wheezing heard, no crackles.   Heart: S1 and S2 present, no murmur, regular rhythm on tele  Abdomen: Soft, nontender, no guarding or rigidity, bowel sounds positive.  Extremities:  no cyanosis or clubbing,  no edema, warm to touch.  Neurologic: Left hemiplegia  Interval:  (arrival to ICU)  1a. Level of Consciousness: 0-->Alert, keenly responsive  1b. LOC " Questions: 2-->Answers neither question correctly  1c. LOC Commands: 1-->Performs one task correctly  2. Best Gaze: 1-->Partial gaze palsy, gaze is abnormal in one or both eyes, but forced deviation or total gaze paresis is not present  3. Visual: 2-->Complete hemianopia  4. Facial Palsy: 2-->Partial paralysis (total or near-total paralysis of lower face)  5a. Motor Arm, Left: 4-->No movement  5b. Motor Arm, Right: 0-->No drift, limb holds 90 (or 45) degrees for full 10 secs  6a. Motor Leg, Left: 3-->No effort against gravity, leg falls to bed immediately  6b. Motor Leg, Right: 2-->Some effort against gravity, leg falls to bed by 5 secs, but has some effort against gravity  7. Limb Ataxia: 0-->Absent  8. Sensory: 2-->Severe to total sensory loss, patient is not aware of being touched in the face, arm, and leg  9. Best Language: 1-->Mild-to-moderate aphasia, some obvious loss of fluency or facility of comprehension, without significant limitation on ideas expressed or form of expression. Reduction of speech and/or comprehension, however, makes conversation. . . (see row details)  10. Dysarthria: 1-->Mild-to-moderate dysarthria, patient slurs at least some words and, at worst, can be understood with some difficulty  11. Extinction and Inattention (formerly Neglect): 2-->Profound lorraine-inattention/extinction more than 1 modality    Total (NIH Stroke Scale): 23                Invalid input(s): CHLORIDE  CrCl cannot be calculated (Patient's most recent lab result is older than the maximum 30 days allowed.).          Lab Results   Component Value Date    LACTATE 1.3 02/09/2015        Images:None currently    I reviewed the patient's results and images.     Impression     Acute ischemic right MCA stroke (HCC)    DM (diabetes mellitus), type 2 (HCC)    S/p left hip fracture    Plan/Recommendations     72-year-old female with past medical history of diabetes, left hip fracture, CKD, hypertension, sleep apnea, dyslipidemia,  generalized anxiety disorder, depression, anemia, multiple falls, GERD presented to [facility] after transfer from [facility] for acute CVA management.  Patient apparently was admitted there on September 19 with left hip/pelvic fracture after sustaining a fall.  Orthopedics evaluated the patient and it was felt to be inoperable fracture.  Today morning patient was found to have development of acute encephalopathy, facial droop, aphasia and left-sided weakness.  Code stroke was called and patient was evaluated.  Patient was given IV tPA and at possibility of stroke.  Further work-up including CTA showed filling defect at the distal right M1 branch extending into the M2 branch.  Subacute infarction noted in the right MCA territory.  CT head perfusion showed right MCA territory CVA with associated penumbra.  Case was discussed with Dr. Marte interventional neurology and patient was brought in emergently for thrombectomy.  Patient underwent cerebral angiogram where she was found to have abrupt occlusion of the dominant superior division M2 branch of the right MCA consistent with acute thromboembolic event.  Mechanical thrombectomy was done with the return of normal flow to the right cerebral hemisphere.  Post procedure patient is admitted to ICU for closer monitoring.  Patient apparently had elevated troponin earlier today on the morning labs.  EKG reviewed and showed normal sinus rhythm with diffuse ST depressions across multiple leads.  Troponin is mildly elevated.  Patient on arrival to ICU was on max dose of nicardipine.  Blood pressure systolic was in the 80s.  We stopped Cardene drip.  We will give 250 mL bolus of saline and continue with saline infusion for now.    1.  Admit to intensive care unit with acute CVA status post tPA and neuro intervention.  2.  Neurology and neurosurgery team following course closely.  Further management of acute CVA per neurology team.  3.  Blood  pressure is low post procedure.  Stop nicardipine drip.  Will use nicardipine as needed to keep systolic blood pressure less than 140.  Will give fluid bolus now as mean arterial pressures as 60s.  Try to maintain systolic blood pressure 100-140 for now.  4.  Patient and chronic kidney disease and recent contrast.  Will give normal saline infusion and monitor urine output and electrolytes closely.  5.  Echocardiogram with a bubble study.  Mild troponin elevation and diffuse ST depressions noted.  Will await echocardiogram to see if there are any wall motion abnormalities and then will get cardiology input as well.  6.  Get outside records to evaluate orthopedic evaluation and if patient can be weightbearing.  7.  GI prophylaxis added.  CDs for DVT prophylaxis.  We will start pharmacologic prophylaxis once outside of tPA window.  8.  Aspirin 24-hour post tPA, high-dose statin.  9.  Sliding scale insulin coverage for hyperglycemia management.  10.  PT/OT/speech eval.    Continue close monitoring in ICU.       High level of risk due to:  illness with threat to life or bodily function.    Time spent 40 min (exclusive of procedure time)  including high complexity decision making to assess, manipulate, and support vital organ system failure in this individual who has impairment of one or more vital organ systems such that there is a high probability of imminent or life threatening deterioration in the patient’s condition.      Lul Keane MD, [facility]  Pulmonary and Critical Care Medicine  09/21/22 11:38 EDT     CC: Sujit Batista MD      Electronically signed by Lul Keane MD at 09/21/22 1300         Current Facility-Administered Medications   Medication Dose Route Frequency Provider Last Rate Last Admin   • acetaminophen (TYLENOL) tablet 650 mg  650 mg Oral Q6H PRN Rey Pizarro APRN   650 mg at 10/02/22 0505   • aspirin tablet 325 mg  325 mg Oral Daily Pasquale Horton, PharmD   325 mg at 10/03/22 0834     Or   • aspirin suppository 300 mg  300 mg Rectal Daily Pasquale Horton, PharmD       • atorvastatin (LIPITOR) tablet 80 mg  80 mg Oral Nightly Pasquale Horton PharmD   80 mg at 10/02/22 2109   • sennosides-docusate (PERICOLACE) 8.6-50 MG per tablet 2 tablet  2 tablet Oral BID MoiraRey leone A, APRN   2 tablet at 10/02/22 0846    And   • polyethylene glycol (MIRALAX) packet 17 g  17 g Oral Daily PRN Rm Pizarroua A, APRN        And   • bisacodyl (DULCOLAX) EC tablet 5 mg  5 mg Oral Daily PRN Rey Pizarro, APRN        And   • bisacodyl (DULCOLAX) suppository 10 mg  10 mg Rectal Daily PRN Rey Pizarro A, APRN       • buPROPion XL (WELLBUTRIN XL) 24 hr tablet 300 mg  300 mg Oral Daily Pasquale Horton, PharmD   300 mg at 10/03/22 0819   • carbamide peroxide (DEBROX) 6.5 % otic solution 5 drop  5 drop Right Ear BID PRN Valentin Dey PA-C   5 drop at 09/29/22 0717   • carvedilol (COREG) tablet 3.125 mg  3.125 mg Oral BID With Meals Pasquale Horton, PharmD   3.125 mg at 10/03/22 0819   • clopidogrel (PLAVIX) tablet 75 mg  75 mg Oral Q24H Pasquale Horton, PharmD   75 mg at 10/02/22 2109   • dextrose (D50W) (25 g/50 mL) IV injection 25 g  25 g Intravenous Q15 Min PRN Mitch Underwood MD   25 g at 09/30/22 0234   • dextrose (GLUTOSE) oral gel 15 g  15 g Oral Q15 Min PRN Pasquale Horton, PharmD       • docusate sodium (COLACE) capsule 100 mg  100 mg Oral BID Pasquale Horton, PharmD   100 mg at 10/02/22 0846   • famotidine (PEPCID) tablet 20 mg  20 mg Oral Nightly Pasquale Horton, PharmD   20 mg at 10/02/22 2109   • glucagon (human recombinant) (GLUCAGEN DIAGNOSTIC) injection 1 mg  1 mg Intramuscular Q15 Min PRN Mitch Underwood MD       • Insulin Lispro (humaLOG) injection 0-7 Units  0-7 Units Subcutaneous 4x Daily With Meals & Nightly Eli Craig APRN   5 Units at 10/02/22 2109   • ondansetron (ZOFRAN) injection 4 mg  4 mg Intravenous Q6H PRN Mitch Underwood MD       • oxybutynin XL (DITROPAN-XL) 24 hr tablet  5 mg  5 mg Oral Daily Pasquale Horton PharmD   5 mg at 10/03/22 0819   • potassium & sodium phosphates (PHOS-NAK) 280-160-250 MG packet - for Phosphorus less than 1.25 mg/dL  2 packet Oral Q6H PRN Pasquale Horton PharmD        Or   • potassium & sodium phosphates (PHOS-NAK) 280-160-250 MG packet - for Phosphorus 1.25 - 2.5 mg/dL  2 packet Oral Q6H PRN Pasquale Horton PharmD       • sacubitril-valsartan (ENTRESTO) 24-26 MG tablet 1 tablet  1 tablet Oral Q12H Pasquale Horton PharmD   1 tablet at 10/03/22 0819   • sodium chloride 0.9 % flush 10 mL  10 mL Intravenous Q12H Mitch Underwood MD   10 mL at 10/03/22 0819   • sodium chloride 0.9 % flush 10 mL  10 mL Intravenous PRN Mitch Underwood MD       • spironolactone (ALDACTONE) tablet 25 mg  25 mg Oral Daily Pasquale Horton PharmD   25 mg at 10/03/22 0819   • traMADol (ULTRAM) tablet 50 mg  50 mg Oral Q8H PRN Pasquale Horton PharmD   50 mg at 10/03/22 0731        Physician Progress Notes (most recent note)      Rey Pizarro APRN at 10/02/22 0836              [facility] Medicine Services  PROGRESS NOTE    Patient Name: Catherine Carroll  : 1950  MRN: [id number]    Date of Admission: 2022  Primary Care Physician: Sujit Batista MD    Subjective   Subjective     CC:  Follow up CVA, takotsubo cardiomyopathy    HPI:  Patient seen resting in bed in no apparent distress. No acute events overnight per nursing. She is feeling about the same today. Continues to have left hip pain. Awaiting rehab placement. No additional concerns at this time.     ROS:  Gen- No fevers, chills  CV- No chest pain, palpitations  Resp- No cough, dyspnea  GI- No N/V/D, abd pain    Objective   Objective     Vital Signs:   Temp:  [97.5 °F (36.4 °C)-98.3 °F (36.8 °C)] 97.9 °F (36.6 °C)  Heart Rate:  [66-81] 72  Resp:  [18] 18  BP: ()/(43-94) 101/58     Physical Exam:  Constitutional: No acute distress, awake, alert  HENT: NCAT, mucous membranes  moist  Respiratory: Clear to auscultation bilaterally, respiratory effort normal on RA  Cardiovascular: RRR, no murmurs, cap refill brisk   Gastrointestinal: Positive bowel sounds, soft, nontender, nondistended  Musculoskeletal: No BLE edema   Psychiatric: Appropriate affect, cooperative  Neurologic: Oriented x 3, left sided weakness, mild facial droop, speech clear  Skin: warm, dry, no visible rash     Results Reviewed:  LAB RESULTS:      Lab 10/02/22  0749 10/01/22  0644 09/28/22  0945   WBC 7.96 7.40 6.76   HEMOGLOBIN 10.2* 10.6* 10.6*   HEMATOCRIT 32.5* 34.5 31.7*   PLATELETS 251 259 211   NEUTROS ABS  --  4.19  --    IMMATURE GRANS (ABS)  --  0.02  --    LYMPHS ABS  --  1.94  --    MONOS ABS  --  1.13*  --    EOS ABS  --  0.09  --    MCV 93.4 93.5 89.3         Lab 10/01/22  0644 09/30/22  1054 09/28/22  0945 09/27/22  0916   SODIUM 139 140 141 139   POTASSIUM 4.6 4.6 5.2 4.7   CHLORIDE 105 104 103 104   CO2 27.0 29.0 26.0 26.0   ANION GAP 7.0 7.0 12.0 9.0   BUN 27* 29* 29* 34*   CREATININE 1.70* 1.34* 1.36* 1.35*   EGFR 31.7* 42.2* 41.5* 41.8*   GLUCOSE 133* 134* 108* 115*   CALCIUM 8.3* 8.6 8.4* 8.5*                         Brief Urine Lab Results     None          Microbiology Results Abnormal     None          No radiology results from the last 24 hrs    Results for orders placed during the hospital encounter of 09/21/22    Adult Transthoracic Echo Complete W/ Cont if Necessary Per Protocol (With Agitated Saline)    Interpretation Summary  · Estimated left ventricular EF = 30%  · Left ventricular wall thickness is consistent with mild concentric hypertrophy.  · The findings are consistent with stress-induced (Takotsubo) cardiomyopathy.  · The cardiac valves are anatomically and functionally normal.  · Saline test results are negative.      I have reviewed the medications:  Scheduled Meds:aspirin, 325 mg, Oral, Daily   Or  aspirin, 300 mg, Rectal, Daily  atorvastatin, 80 mg, Oral, Nightly  buPROPion XL, 300  mg, Oral, Daily  carvedilol, 3.125 mg, Oral, BID With Meals  clopidogrel, 75 mg, Oral, Q24H  docusate sodium, 100 mg, Oral, BID  famotidine, 20 mg, Oral, Nightly  insulin lispro, 0-7 Units, Subcutaneous, TID AC  oxybutynin XL, 5 mg, Oral, Daily  sacubitril-valsartan, 1 tablet, Oral, Q12H  senna-docusate sodium, 2 tablet, Oral, BID  sodium chloride, 10 mL, Intravenous, Q12H  spironolactone, 25 mg, Oral, Daily      Continuous Infusions:   PRN Meds:.•  acetaminophen  •  senna-docusate sodium **AND** polyethylene glycol **AND** bisacodyl **AND** bisacodyl  •  carbamide peroxide  •  dextrose  •  dextrose  •  glucagon (human recombinant)  •  ondansetron  •  potassium & sodium phosphates **OR** potassium & sodium phosphates  •  sodium chloride  •  traMADol    Assessment & Plan   Assessment & Plan     Active Hospital Problems    Diagnosis  POA   • **Acute ischemic right MCA stroke (HCC) [I63.511]  Yes   • Dysphagia, unspecified type [R13.10]  Yes   • Acute ischemic stroke (HCC) [I63.9]  Yes   • S/p left hip fracture [Z87.81]  Not Applicable   • DM (diabetes mellitus), type 2 (HCC) [E11.9]  Yes      Resolved Hospital Problems   No resolved problems to display.        Brief Hospital Course to date:  Catherine Carroll is a 72 y.o. female with diabetes, recent left hip fracture, CKD, hypertension, sleep apnea, dyslipidemia, generalized anxiety disorder, depression, anemia, multiple falls, and GERD who presented to [facility] on 9/21/2022 after transfer from [facility] for acute CVA management.  Patient apparently was admitted there on September 19 with left hip/pelvic fracture after sustaining a fall.  Orthopedics evaluated the patient and it was felt to be inoperable fracture.  The following morning, patient was found to have development of acute encephalopathy, facial droop, aphasia and left-sided weakness.  Code stroke was called and patient was evaluated.  Patient was given IV tPA..  Further  work-up including CTA showed filling defect at the distal right M1 branch extending into the M2 branch.  Subacute infarction noted in the right MCA territory.  CT head perfusion showed right MCA territory CVA with associated penumbra.  Case was discussed with interventional neurology and patient was brought in emergently for thrombectomy.  Patient underwent cerebral angiogram where she was found to have abrupt occlusion of the dominant superior division M2 branch of the right MCA consistent with acute thromboembolic event.  Mechanical thrombectomy was done with the return of normal flow to the right cerebral hemisphere.  Post procedure patient was admitted to ICU for closer monitoring. Patient transferred from ICU to telemetry 9/24. CM is following for SNF placement.      This patient's problems and plans were partially entered by my partner and updated as appropriate by me 10/02/22.     Assessment/Plan:     Acute ischemic stroke, right MCA, superior division, likely etiology thromboembolism   -S/P tPA at OSH and mechanical thrombectomy at [facility]   -MRI could not be done due to bladder stimulator in place  -Continue DAPT, statin  -Cardiac monitor at DC-may not be feasible with life vest  -PT/OT/SLP  -Follow-up stroke clinic 1 month      Takotsubo's Cardiomyopathy   Unstable Angina  Abnormal EKG  HLD  -Cardiology following, [facility] showed no significant ischemic heart disease  -Continue lower dose of Coreg, 3.125mg BID   -Continue Aldactone 25 mg daily.  -Started Entresto per cardiology  -Life vest prior to discharge  -Follow up with Cardiology in 6 weeks.      Severe oral, Moderate pharyngeal Dysphagia   -SLP following  -Started on dysphagia diet   --s/p mbs 9/29.  Recs to continue current diet mechanical soft, no mixed consistencies and nectar thickened liquids.     HTN  -Normal BP goals per Neuro   -continue Coreg, Entresto, Aldactone      DM2  -A1c 5.7%  -Continue basal/bolus insulin  -- Glucose stable.     S/P L hip  Fx  Multiple Falls   -Non-operative management per outside Ortho eval   -WBAT, PT/OT     CKD  -Creatinine at baseline, ~1.3   -Monitor with initiation of Entresto, Aldactone   -- Creatinine increased to 1.74 this AM   -encouraged oral hydration  --am labs       Anxiety/Depression  -Continue Wellbutrin   -- Appears stable.     GERD  -Continue Pepcid      Expected Discharge Location and Transportation: SNF with transition to LTC    Expected Discharge Date: 10/3    DVT prophylaxis:  Mechanical DVT prophylaxis orders are present.     AM-PAC 6 Clicks Score (PT): 9 (22 1508)    CODE STATUS:   Code Status and Medical Interventions:   Ordered at: 22 1928     Code Status (Patient has no pulse and is not breathing):    CPR (Attempt to Resuscitate)     Medical Interventions (Patient has pulse or is breathing):    Full Support     Release to patient:    Routine Release       DAMEIN Cronin  10/02/22              Electronically signed by Rey Pizarro APRN at 10/02/22 1148          Physical Therapy Notes (most recent note)      Babs Hopkins, PT at 22 1613  Version 1 of 1         Patient Name: Catherine Carroll  : 1950    MRN: [id number]                              Today's Date: 2022       Admit Date: 2022    Visit Dx:     ICD-10-CM ICD-9-CM   1. Oropharyngeal dysphagia  R13.12 787.22   2. Dysarthria  R47.1 784.51   3. Cognitive communication deficit  R41.841 799.52     Patient Active Problem List   Diagnosis   • Chronic kidney disease   • Closed right hip fracture- Comminuted intertrochanteric and supratrochanteric fracture of femur    • Closed fracture of distal end of right radius- comminuted    • Fall down stairs   • DM (diabetes mellitus), type 2 (HCC)   • Depression   • Anemia, blood loss   • Hypokalemia   • Closed left hip fracture (HCC)   • Essential hypertension   • RAVEN (obstructive sleep apnea)   • Hyperlipidemia   • Generalized anxiety disorder   • Fall   • Severe obesity  (BMI 35.0-35.9 with comorbidity) ([facility])   • S/p left hip fracture   • Acute ischemic right MCA stroke (HCC)   • Dysphagia, unspecified type   • Acute ischemic stroke (HCC)     Past Medical History:   Diagnosis Date   • Acid reflux    • Alopecia     severe   • Anxiety    • Chronic kidney disease     NKF classification) See under renal insuff - renal MD has limited her to 50 gms prot/day - this will signif affect her ability to be successful with WLS and may incr her risk of leak (gerardo as her prealb already low) - she still adamantly wishes to proceed.  Also has proteinuria   • Depression    • Diabetes mellitus (HCC)    • Dyspepsia    • Dyspnea on exertion    • Fatigue    • Hyperlipidemia    • Hypertension    • Hypoalbuminemia     3.1   • Insomnia    • Joint pain    • Morbid obesity (HCC)     (SUPER)   • Nephrolithiasis    • Obstructive sleep apnea     prior to her orig Band in 2007 she had RAVEN on CPAP (309 lbs).     • Proteinuria    • PTSD (post-traumatic stress disorder)    • Renal insufficiency     related to nephrolithiasis.  GFR 45, (L) 40%, (R) 60%,  follows w/ Dr. Bunch   • Type 2 diabetes mellitus ([facility])     dx 1997, on insulin 10+ yrs, A1c 9.5 5/14/15.     • Urolithiasis      Past Surgical History:   Procedure Laterality Date   • BREAST LUMPECTOMY Right 2010    benign cyst   • CARDIAC CATHETERIZATION N/A 9/27/2022    Procedure: LEFT HEART CATH;  Surgeon: Mitch Underwood MD;  Location: [facility] INVASIVE LOCATION;  Service: Cardiovascular;  Laterality: N/A;   • CHOLECYSTECTOMY     • CYSTOSCOPY     • CYSTOSCOPY W/ LITHOLAPAXY / EHL     • GASTRIC BANDING REMOVAL  11/2010    by MIYA for intolerance    • GASTRIC PORT CHANGE      lapband port reposition and shortening of lapband tubing after a port flip in 11/2007 by MIYA    • GASTRIC SLEEVE LAPAROSCOPIC      LAGB REG/ HHR 2/2007 by MIYA   • HIP TROCHANTERIC NAILING WITH INTRAMEDULLARY HIP SCREW Right 9/28/2016    Procedure: HIP TROCANTERIC NAILING WITH  INTRAMEDULLARY HIP SCREW;  Surgeon: Deshawn Solis MD;  Location:  [facility];  Service:    • HIP TROCHANTERIC NAILING WITH INTRAMEDULLARY HIP SCREW Left 1/23/2019    Procedure: HIP TROCANTERIC NAILING LEFT;  Surgeon: Luther Thurston MD;  Location:  [facility];  Service: Orthopedics   • INTERVENTIONAL RADIOLOGY PROCEDURE N/A 9/21/2022    Procedure: IR MECHANICAL THROMBECTOMY - PRIMARY;  Surgeon: Randy Hair MD;  Location:  [facility] CATH INVASIVE LOCATION;  Service: Interventional Radiology;  Laterality: N/A;   • KIDNEY SURGERY Left 2003    kidney surgery for embedded stent removal; multiple kidney stone removals and stent placements     • LAPAROSCOPIC CHOLECYSTECTOMY  2003    for gallstones   • LAPAROSCOPIC GASTRIC BANDING      Adjustable Gastric Band s/p LAGB APL/ HHR 8/2011 by JSO    • SHOULDER SURGERY Left 12/27/2013   • TONSILLECTOMY  1957   • ULNA/RADIUS CLOSED REDUCTION Right 9/28/2016    Procedure: ULNA/RADIUS CLOSED REDUCTION;  Surgeon: Deshawn Solis MD;  Location:  [facility];  Service:       General Information     Row Name 09/30/22 1508          Physical Therapy Time and Intention    Document Type therapy note (daily note)  -DM     Mode of Treatment physical therapy  -DM     Row Name 09/30/22 1508          General Information    Existing Precautions/Restrictions fall;other (see comments)  NG d/c'd;pure wick,IV;R MCA CVA/L HP (L Neglect); mech.thrombect; dysphag; decr.cognit;Anx.(fears being left alone,rolling/motion, & quick mvmts; insists staff move very slowly);prev fall w/ L HIP fx/ORIF  -DM           User Key  (r) = Recorded By, (t) = Taken By, (c) = Cosigned By    Initials Name Provider Type    DM Babs Hopkins, PT Physical Therapist               Mobility     Row Name 09/30/22 1508          Bed Mobility    Bed Mobility rolling left;rolling right;scooting/bridging  -DM     Rolling Left [place] (Bed Mobility) verbal cues;maximum assist (25% patient effort);1 person to manage equipment   -DM     Rolling Right [place] (Bed Mobility) verbal cues;maximum assist (25% patient effort);2 person assist  unable to reach Hemipl. LUE to R rail to assist  -DM     Scooting/Bridging [place] (Bed Mobility) verbal cues;dependent (less than 25% patient effort);2 person assist  Scooted to HOB on sling, using draw sheet  -DM     Assistive Device (Bed Mobility) bed rails;draw sheet;head of bed elevated  -DM     Comment, (Bed Mobility) friend visiting;pt w/L neglect(continual cueing to focus on L Extrem for rolling/bed mobil);req pain med for LBP 8/10;Nsg brought pill & applesauce;pt impulsively grabbed spoon & insisted on self-admin( ataxic mvmts);Rolled to place sling;pt highly anx.& grabbing at PT & nsg's hands to calm herself(fears rolling/mvmt & needs continual reassurance);As nsg leaving,pt calling for her to return ASAP (fears being left alone;friend went to public BR & never returned);lift sling placed  -DM     Row Name 09/30/22 1508          Transfers    Comment, (Transfers) Transf to chair w/ sling;s/p ther exer,pt c/o fatigue & asked PT to stop,to allow her to nap;instantly fell asleep  -DM     Row Name 09/30/22 1508          Bed-Chair Transfer    Bed-Chair [place] (Transfers) dependent (less than 25% patient effort);2 person assist  -DM     Assistive Device (Bed-Chair Transfers) lift device  -DM     Comment, (Bed-Chair Transfer) once UIC, pt req. reclined back s/p exer & decl. STS trial (LBP still 8/10)  -DM     Row Name 09/30/22 1508          Sit-Stand Transfer    Sit-Stand [place] (Transfers) unable to assess  -DM     Row Name 09/30/22 1508          Gait/Stairs (Locomotion)    [place] Level (Gait) unable to assess  -DM           User Key  (r) = Recorded By, (t) = Taken By, (c) = Cosigned By    Initials Name Provider Type    DM Babs Hopkins, PT Physical Therapist               Obj/Interventions     Row Name 09/30/22 1507          Motor Skills    Therapeutic Exercise  hip;knee;ankle;shoulder  Counted reps aloud to focus on task,but starts over freq & does 12 reps but only counts 10  -DM     Row Name 09/30/22 1508          Shoulder (Therapeutic Exercise)    Shoulder AROM (Therapeutic Exercise) right;flexion;extension;aBduction;aDduction;horizontal aBduction/aDduction;sitting;10 repetitions;other (see comments)  R Biceps curls; decl. self ROM of LUE w/ RUE/hands clasped  -DM     Row Name 09/30/22 1508          Hip (Therapeutic Exercise)    Hip (Therapeutic Exercise) AROM (active range of motion);AAROM (active assistive range of motion);PROM (passive range of motion);isometric exercises  -DM     Hip AROM (Therapeutic Exercise) right;flexion;extension;aBduction;aDduction;external rotation;internal rotation;sitting;10 repetitions;supine  also BKFO, Bridging x 2  -DM     Hip AAROM (Therapeutic Exercise) left;flexion;extension;aBduction;aDduction;external rotation;internal rotation;sitting;10 repetitions;5 repetitions  -DM     Hip PROM (Therapeutic Exercise) left;flexion;extension;sitting;5 repetitions  unable to assist w/ last 5 reps hip F/E d/t fatigue  -DM     Hip Isometrics (Therapeutic Exercise) bilateral;gluteal sets;sitting;10 repetitions  -DM     Row Name 09/30/22 1508          Knee (Therapeutic Exercise)    Knee (Therapeutic Exercise) AROM (active range of motion);AAROM (active assistive range of motion);isometric exercises  -DM     Knee AROM (Therapeutic Exercise) right;flexion;extension;SAQ (short arc quad);LAQ (long arc quad);heel slides;sitting;supine;10 repetitions;other (see comments)  2 reps R SLR  -DM     Knee AAROM (Therapeutic Exercise) left;flexion;extension;sitting;10 repetitions;supine  10 Reps A/A h.slides, LAQ w/ min A, & mod A w/ SAQ  -DM     Knee Isometrics (Therapeutic Exercise) bilateral;quad sets;sitting;10 repetitions  -DM     Row Name 09/30/22 1508          Ankle (Therapeutic Exercise)    Ankle (Therapeutic Exercise) AROM (active range of motion);AAROM  (active assistive range of motion)  -DM     Ankle AROM (Therapeutic Exercise) right;dorsiflexion;plantarflexion;sitting;10 repetitions;other (see comments)  & AC  -DM     Ankle AAROM (Therapeutic Exercise) left;dorsiflexion;plantarflexion;sitting;10 repetitions;other (see comments)  AC  -DM     Row Name 09/30/22 1508          Balance    Balance Assessment sitting static balance;sitting dynamic balance  -DM     Static Sitting Balance standby assist;verbal cues  held trunk forw x 2  -DM     Dynamic Sitting Balance verbal cues;minimal assist  Recip scooting in chair  -DM     Position, Sitting Balance supported;sitting in chair  pillow under LUE  -DM     Balance Interventions sitting;static;dynamic;weight shifting activity  -DM           User Key  (r) = Recorded By, (t) = Taken By, (c) = Cosigned By    Initials Name Provider Type    Babs De, PT Physical Therapist               Goals/Plan    No documentation.                Clinical Impression     Row Name 09/30/22 1508          Pain    Pretreatment Pain Rating 8/10  -DM     Posttreatment Pain Rating 8/10  -DM     Pain Location - Side/Orientation Bilateral  -DM     Pain Location lower  -DM     Pain Location - back  -DM     Pain Intervention(s) Medication (See MAR);Repositioned;Elevated;Rest  -DM     Additional Documentation Pain Scale: Numbers Pre/Post-Treatment (Group)  -DM     Row Name 09/30/22 1506          Plan of Care Review    Plan of Care Reviewed With patient;friend  -DM     Progress improving  -DM     Outcome Evaluation Rolled R W/ Max 2A & L w/ max 1A to place lift sling (scooted to HOB w/ draw sheet & Dep.2A), transf to chair w/ mech lift, sit bal. activ UIC, & ther exer sup & sit,but decl. STS trials d/t LBP 8/10 (pain pill given) & signif fatigue (req. nap & fell asleep immed.). Noted HR 77, o2 sat 92%, & low BP init (102/54).  -DM     Row Name 09/30/22 1508          Vital Signs    Pre Systolic BP Rehab 102  -DM     Pre Treatment Diastolic BP 52   -DM     Intra Systolic BP Rehab 102  -DM     Intra Treatment Diastolic BP 54  -DM     Post Systolic BP Rehab 121  -DM     Post Treatment Diastolic BP 70  -DM     Pretreatment Heart Rate (beats/min) 66  -DM     Intratreatment Heart Rate (beats/min) 77  -DM     Posttreatment Heart Rate (beats/min) 70  -DM     Pre SpO2 (%) 94  -DM     O2 Delivery Pre Treatment room air  -DM     Intra SpO2 (%) 92  -DM     O2 Delivery Intra Treatment room air  -DM     Post SpO2 (%) 96  -DM     O2 Delivery Post Treatment room air  -DM     Pre Patient Position Supine  -DM     Intra Patient Position Side Lying  -DM     Post Patient Position Sitting  -DM     Row Name 09/30/22 1508          Positioning and Restraints    Pre-Treatment Position in bed  -DM     Post Treatment Position chair  -DM     In Chair notified nsg;reclined;call light within reach;encouraged to call for assist;exit alarm on;waffle cushion;on mechanical lift sling;legs elevated;LUE elevated  -DM           User Key  (r) = Recorded By, (t) = Taken By, (c) = Cosigned By    Initials Name Provider Type    DM Basb Hopkins, PT Physical Therapist               Outcome Measures     Row Name 09/30/22 1508 09/30/22 0800       How much help from another person do you currently need...    Turning from your back to your side while in flat bed without using bedrails? 2  -DM 2  -LT    Moving from lying on back to sitting on the side of a flat bed without bedrails? 2  -DM 2  -LT    Moving to and from a bed to a chair (including a wheelchair)? 1  -DM 2  -LT    Standing up from a chair using your arms (e.g., wheelchair, bedside chair)? 2  -DM 2  -LT    Climbing 3-5 steps with a railing? 1  -DM 1  -LT    To walk in hospital room? 1  -DM 1  -LT    AM-PAC 6 Clicks Score (PT) 9  -DM 10  -LT    Highest level of mobility 3 --> Sat at edge of bed  -DM 4 --> Transferred to chair/commode  -LT    Row Name 09/30/22 1508          Modified Bureau Scale    Modified Bureau Scale 4 - Moderately severe  disability.  Unable to walk without assistance, and unable to attend to own bodily needs without assistance.  -DM     Row Name 09/30/22 1508          Functional Assessment    Outcome Measure Options AM-PAC 6 Clicks Basic Mobility (PT)  -DM           User Key  (r) = Recorded By, (t) = Taken By, (c) = Cosigned By    Initials Name Provider Type    DM Basb Hopkins, PT Physical Therapist    Adrianna Rivera, RN Registered Nurse                             Physical Therapy Education                 Title: PT OT SLP Therapies (In Progress)     Topic: Physical Therapy (In Progress)     Point: Mobility training (In Progress)     Learning Progress Summary           Patient Eager, E,D, NR by DM at 9/30/2022 1612    Acceptance, E, NR by AS at 9/27/2022 1038    Acceptance, E, NR by KG at 9/24/2022 1009    Acceptance, E,TB, VU,NR by AY at 9/22/2022 1501   Other Eager, E,D, NR by DM at 9/30/2022 1612                   Point: Home exercise program (In Progress)     Learning Progress Summary           Patient Eager, E,D, NR by DM at 9/30/2022 1612    Acceptance, E, NR by AS at 9/27/2022 1038    Acceptance, E, NR by KG at 9/24/2022 1009   Other Eager, E,D, NR by DM at 9/30/2022 1612                   Point: Body mechanics (In Progress)     Learning Progress Summary           Patient Eager, E,D, NR by DM at 9/30/2022 1612    Acceptance, E, NR by AS at 9/27/2022 1038    Acceptance, E, NR by KG at 9/24/2022 1009    Acceptance, E,TB, VU,NR by AY at 9/22/2022 1501   Other Eager, E,D, NR by DM at 9/30/2022 1612                   Point: Precautions (In Progress)     Learning Progress Summary           Patient Eager, E,D, NR by DM at 9/30/2022 1612    Acceptance, E, NR by AS at 9/27/2022 1038    Acceptance, E, NR by KG at 9/24/2022 1009    Acceptance, E,TB, VU,NR by AY at 9/22/2022 1501   Other Eager, E,D, NR by DM at 9/30/2022 1612                               User Key     Initials Effective Dates Name Provider Type Discipline    DM  06/16/21 -  Babs Hopkins, PT Physical Therapist PT    AS 06/16/21 -  Andreia Farris, PTA Physical Therapist Assistant PT    KG 05/22/20 -  Alison Bellamy, PT Physical Therapist PT    AY 11/10/20 -  Andreia Ballesteros, TOREY Physical Therapist PT              PT Recommendation and Plan     Plan of Care Reviewed With: patient, friend  Progress: improving  Outcome Evaluation: Rolled R W/ Max 2A & L w/ max 1A to place lift sling (scooted to HOB w/ draw sheet & Dep.2A), transf to chair w/ mech lift, sit bal. activ UIC, & ther exer sup & sit,but decl. STS trials d/t LBP 8/10 (pain pill given) & signif fatigue (req. nap & fell asleep immed.). Noted HR 77, o2 sat 92%, & low BP init (102/54).     Time Calculation:    PT Charges     Row Name 09/30/22 1612             Time Calculation    Start Time 1508  -DM      PT Received On 09/30/22  -DM      PT Goal Re-Cert Due Date 10/02/22  -DM              Time Calculation- PT    Total Timed Code Minutes- PT 38 minute(s)  -DM              Timed Charges    03769 - PT Therapeutic Exercise Minutes 24  -DM      62388 - PT Therapeutic Activity Minutes 14  -DM              Total Minutes    Timed Charges Total Minutes 38  -DM       Total Minutes 38  -DM            User Key  (r) = Recorded By, (t) = Taken By, (c) = Cosigned By    Initials Name Provider Type    DM Babs Hopkins, PT Physical Therapist              Therapy Charges for Today     Code Description Service Date Service Provider Modifiers Qty    94495681789 HC PT THER PROC EA 15 MIN 9/30/2022 Babs Hopkins, PT GP 2    73586946838 HC PT THERAPEUTIC ACT EA 15 MIN 9/30/2022 Babs Hopkins, PT GP 1          PT G-Codes  Outcome Measure Options: AM-PAC 6 Clicks Basic Mobility (PT)  AM-PAC 6 Clicks Score (PT): 9  AM-PAC 6 Clicks Score (OT): 10  Modified Haydee Scale: 4 - Moderately severe disability.  Unable to walk without assistance, and unable to attend to own bodily needs without assistance.    Babs Hopkins,  PT  2022      Electronically signed by Babs Hopkins, PT at 22 1613          Occupational Therapy Notes (most recent note)      Linda Dietrich, OT at 22 1031          Patient Name: Catherine Carroll  : 1950    MRN: [id number]                              Today's Date: 2022       Admit Date: 2022    Visit Dx:     ICD-10-CM ICD-9-CM   1. Oropharyngeal dysphagia  R13.12 787.22   2. Dysarthria  R47.1 784.51   3. Cognitive communication deficit  R41.841 799.52     Patient Active Problem List   Diagnosis   • Chronic kidney disease   • Closed right hip fracture- Comminuted intertrochanteric and supratrochanteric fracture of femur    • Closed fracture of distal end of right radius- comminuted    • Fall down stairs   • DM (diabetes mellitus), type 2 ([facility])   • Depression   • Anemia, blood loss   • Hypokalemia   • Closed left hip fracture ([facility])   • Essential hypertension   • RAVEN (obstructive sleep apnea)   • Hyperlipidemia   • Generalized anxiety disorder   • Fall   • Severe obesity (BMI 35.0-35.9 with comorbidity) ([facility])   • S/p left hip fracture   • Acute ischemic right MCA stroke ([facility])   • Dysphagia, unspecified type   • Acute ischemic stroke ([facility])     Past Medical History:   Diagnosis Date   • Acid reflux    • Alopecia     severe   • Anxiety    • Chronic kidney disease     NKF classification) See under renal insuff - renal MD has limited her to 50 gms prot/day - this will signif affect her ability to be successful with WLS and may incr her risk of leak (gerardo as her prealb already low) - she still adamantly wishes to proceed.  Also has proteinuria   • Depression    • Diabetes mellitus (HCC)    • Dyspepsia    • Dyspnea on exertion    • Fatigue    • Hyperlipidemia    • Hypertension    • Hypoalbuminemia     3.1   • Insomnia    • Joint pain    • Morbid obesity ([facility])     (SUPER)   • Nephrolithiasis    • Obstructive sleep apnea     prior to her orig Band in  she had RAVEN on CPAP (309  lbs).     • Proteinuria    • PTSD (post-traumatic stress disorder)    • Renal insufficiency     related to nephrolithiasis.  GFR 45, (L) 40%, (R) 60%,  follows w/ Dr. Bunch   • Type 2 diabetes mellitus (HCC)     dx 1997, on insulin 10+ yrs, A1c 9.5 5/14/15.     • Urolithiasis      Past Surgical History:   Procedure Laterality Date   • BREAST LUMPECTOMY Right 2010    benign cyst   • CARDIAC CATHETERIZATION N/A 9/27/2022    Procedure: LEFT HEART CATH;  Surgeon: Mitch Underwood MD;  Location:  [facility];  Service: Cardiovascular;  Laterality: N/A;   • CHOLECYSTECTOMY     • CYSTOSCOPY     • CYSTOSCOPY W/ LITHOLAPAXY / EHL     • GASTRIC BANDING REMOVAL  11/2010    by JSO for intolerance    • GASTRIC PORT CHANGE      lapband port reposition and shortening of lapband tubing after a port flip in 11/2007 by JSO    • GASTRIC SLEEVE LAPAROSCOPIC      LAGB REG/ HHR 2/2007 by JSO   • HIP TROCHANTERIC NAILING WITH INTRAMEDULLARY HIP SCREW Right 9/28/2016    Procedure: HIP TROCANTERIC NAILING WITH INTRAMEDULLARY HIP SCREW;  Surgeon: Deshawn Solis MD;  Location:  [facility];  Service:    • HIP TROCHANTERIC NAILING WITH INTRAMEDULLARY HIP SCREW Left 1/23/2019    Procedure: HIP TROCANTERIC NAILING LEFT;  Surgeon: Luther Thurston MD;  Location:  [facility];  Service: Orthopedics   • INTERVENTIONAL RADIOLOGY PROCEDURE N/A 9/21/2022    Procedure: IR MECHANICAL THROMBECTOMY - PRIMARY;  Surgeon: Randy Hair MD;  Location:  [facility];  Service: Interventional Radiology;  Laterality: N/A;   • KIDNEY SURGERY Left 2003    kidney surgery for embedded stent removal; multiple kidney stone removals and stent placements     • LAPAROSCOPIC CHOLECYSTECTOMY  2003    for gallstones   • LAPAROSCOPIC GASTRIC BANDING      Adjustable Gastric Band s/p LAGB APL/ HHR 8/2011 by JSO    • SHOULDER SURGERY Left 12/27/2013   • TONSILLECTOMY  1957   • ULNA/RADIUS CLOSED REDUCTION Right 9/28/2016    Procedure:  ULNA/RADIUS CLOSED REDUCTION;  Surgeon: Deshawn Solis MD;  Location: [facility];  Service:       General Information     Row Name 09/29/22 0937          OT Time and Intention    Document Type therapy note (daily note)  -SD     Mode of Treatment occupational therapy  -SD     Row Name 09/29/22 0937          General Information    Patient Profile Reviewed yes  -SD     Existing Precautions/Restrictions fall;other (see comments)  NG tube, L sided weakness  -SD     Barriers to Rehab medically complex;previous functional deficit;cognitive status;visual deficit  -SD     Row Name 09/29/22 0937          Cognition    Orientation Status (Cognition) oriented to;person;verbal cues/prompts needed for orientation;place;situation;time  -SD     Row Name 09/29/22 0937          Safety Issues, Functional Mobility    Safety Issues Affecting Function (Mobility) awareness of need for assistance;insight into deficits/self-awareness;safety precaution awareness;safety precautions follow-through/compliance;sequencing abilities;judgment;problem-solving  -SD     Impairments Affecting Function (Mobility) balance;cognition;coordination;endurance/activity tolerance;motor control;motor planning;postural/trunk control;range of motion (ROM);sensation/sensory awareness;grasp;strength;visual/perceptual  -SD     Cognitive Impairments, Mobility Safety/Performance awareness, need for assistance;insight into deficits/self-awareness;judgment;problem-solving/reasoning;safety precaution awareness;safety precaution follow-through;sequencing abilities  -SD           User Key  (r) = Recorded By, (t) = Taken By, (c) = Cosigned By    Initials Name Provider Type    SD Linda Dietrich OT Occupational Therapist                 Mobility/ADL's     Row Name 09/29/22 1020          Bed Mobility    Bed Mobility rolling left;rolling right  -SD     Rolling Left [place] (Bed Mobility) maximum assist (25% patient effort);verbal cues;1 person assist  -SD     Rolling  Right [place] (Bed Mobility) maximum assist (25% patient effort);2 person assist;verbal cues  -SD     Row Name 09/29/22 1020          Transfers    Transfers bed-chair transfer  -SD     Bed-Chair [place] (Transfers) dependent (less than 25% patient effort);verbal cues;2 person assist  -SD     Assistive Device (Bed-Chair Transfers) lift device  -SD     Row Name 09/29/22 1020          Activities of Daily Living    BADL Assessment/Intervention grooming  -SD     Row Name 09/29/22 1020          Grooming Assessment/Training    [place] Level (Grooming) hair care, combing/brushing;wash face, hands;oral care regimen;set up;verbal cues;other (see comments)  pt. given cues to use L hand as well, able to grasp comb, but required support to reach head  -SD     Position (Grooming) supported sitting  -SD           User Key  (r) = Recorded By, (t) = Taken By, (c) = Cosigned By    Initials Name Provider Type    Linda Mcfarland OT Occupational Therapist               Obj/Interventions     Row Name 09/29/22 1023          Vision Assessment/Intervention    Vision Assessment Comment pt. tracking to L of midline without cues to locate item or therapist on L  -SD     Row Name 09/29/22 1023          Shoulder (Therapeutic Exercise)    Shoulder AAROM (Therapeutic Exercise) left;table slides, flexion;table slides, aBduction;sitting;10 repetitions  -SD     [facility] Name 09/29/22 1023          Elbow/Forearm (Therapeutic Exercise)    Elbow/Forearm AAROM (Therapeutic Exercise) left;flexion;extension;sitting;10 repetitions  -SD     Row Name 09/29/22 1023          Hand (Therapeutic Exercise)    Hand AROM/AAROM (Therapeutic Exercise) left;finger flexion;finger extension;5 repetitions  -SD     [facility] Name 09/29/22 1023          Balance    Static Sitting Balance supervision;verbal cues  -SD     Dynamic Sitting Balance contact guard;verbal cues  -SD     Position, Sitting Balance unsupported;sitting in chair  -SD     Balance  Interventions sitting;core stability exercise;dynamic reaching;occupation based/functional task;trunk training exercise;combined head and eye movements  -SD           User Key  (r) = Recorded By, (t) = Taken By, (c) = Cosigned By    Initials Name Provider Type    Linda Mcfarland, OT Occupational Therapist               Goals/Plan    No documentation.                Clinical Impression     Row Name 09/29/22 1026          Pain Assessment    Pretreatment Pain Rating 0/10 - no pain  -SD     Posttreatment Pain Rating 0/10 - no pain  -SD     Row Name 09/29/22 1026          Plan of Care Review    Plan of Care Reviewed With patient  -SD     Progress improving  -SD     Outcome Evaluation Pt. with improved cognition, improved sitting balance, and improved L side awareness this AM. Pt. initiating movement with L UE/LE from sitting position in chair. Pt. less anxious with consistent verbal reassurance & cueing. Will cont to progress pt as able per POC. Recommend IPRF upon d/c.  -SD     Row Name 09/29/22 1026          Therapy Assessment/Plan (OT)    Rehab Potential (OT) good, to achieve stated therapy goals  -SD     Criteria for Skilled Therapeutic Interventions Met (OT) yes;skilled treatment is necessary  -SD     Therapy Frequency (OT) daily  -SD     Row Name 09/29/22 1026          Therapy Plan Review/Discharge Plan (OT)    Anticipated Discharge Disposition (OT) inpatient rehabilitation facility  -SD     Row Name 09/29/22 1026          Vital Signs    Pre Systolic BP Rehab 126  -SD     Pre Treatment Diastolic BP 71  -SD     Post Systolic BP Rehab 128  -SD     Post Treatment Diastolic BP 66  -SD     Posttreatment Heart Rate (beats/min) 77  -SD     O2 Delivery Pre Treatment room air  -SD     O2 Delivery Intra Treatment room air  -SD     Post SpO2 (%) 97  -SD     O2 Delivery Post Treatment room air  -SD     Pre Patient Position Supine  -SD     Intra Patient Position Sitting  -SD     Post Patient Position Sitting  -SD      Row Name 09/29/22 1026          Positioning and Restraints    Pre-Treatment Position in bed  -SD     Post Treatment Position chair  -SD     In Chair notified nsg;reclined;call light within reach;encouraged to call for assist;exit alarm on;legs elevated;waffle cushion;on mechanical lift sling  -SD           User Key  (r) = Recorded By, (t) = Taken By, (c) = Cosigned By    Initials Name Provider Type    Linda Mcfarland OT Occupational Therapist               Outcome Measures     Row Name 09/29/22 0937          How much help from another is currently needed...    Putting on and taking off regular lower body clothing? 1  -SD     Bathing (including washing, rinsing, and drying) 2  -SD     Toileting (which includes using toilet bed pan or urinal) 1  -SD     Putting on and taking off regular upper body clothing 2  -SD     Taking care of personal grooming (such as brushing teeth) 3  -SD     Eating meals 1  -SD     AM-PAC 6 Clicks Score (OT) 10  -SD     Row Name 09/29/22 0937          Functional Assessment    Outcome Measure Options AM-PAC 6 Clicks Daily Activity (OT)  -SD           User Key  (r) = Recorded By, (t) = Taken By, (c) = Cosigned By    Initials Name Provider Type    Linda Mcfarland OT Occupational Therapist                Occupational Therapy Education                 Title: PT OT SLP Therapies (In Progress)     Topic: Occupational Therapy (In Progress)     Point: ADL training (In Progress)     Description:   Instruct learner(s) on proper safety adaptation and remediation techniques during self care or transfers.   Instruct in proper use of assistive devices.              Learning Progress Summary           Patient Acceptance, E,TB, NR,NL by  at 9/27/2022 1029    Acceptance, E,TB,D, NR by HK at 9/26/2022 1525    Acceptance, E, NR by JR at 9/24/2022 0834    Acceptance, E, NR by CS at 9/22/2022 1505                   Point: Home exercise program (In Progress)     Description:   Instruct  learner(s) on appropriate technique for monitoring, assisting and/or progressing therapeutic exercises/activities.              Learning Progress Summary           Patient Acceptance, E,TB, NR,NL by  at 9/27/2022 1029    Acceptance, E,TB,D, NR by  at 9/26/2022 1525    Acceptance, E, NR by  at 9/24/2022 0834                   Point: Precautions (In Progress)     Description:   Instruct learner(s) on prescribed precautions during self-care and functional transfers.              Learning Progress Summary           Patient Acceptance, E,TB, NR,NL by  at 9/27/2022 1029    Acceptance, E,TB,D, NR by  at 9/26/2022 1525    Acceptance, E, NR by  at 9/22/2022 1505                   Point: Body mechanics (In Progress)     Description:   Instruct learner(s) on proper positioning and spine alignment during self-care, functional mobility activities and/or exercises.              Learning Progress Summary           Patient Acceptance, E,TB, NR,NL by  at 9/27/2022 1029    Acceptance, E,TB,D, NR by  at 9/26/2022 1525    Acceptance, E, NR by  at 9/22/2022 1505                               User Key     Initials Effective Dates Name Provider Type Discipline     06/16/21 -  Kimberly Bonner, OT Occupational Therapist OT     09/02/21 -  Denisse Mcduffie, OT Occupational Therapist OT     06/16/21 -  Thao Anderson, OT Occupational Therapist OT              OT Recommendation and Plan  Therapy Frequency (OT): daily  Plan of Care Review  Plan of Care Reviewed With: patient  Progress: improving  Outcome Evaluation: Pt. with improved cognition, improved sitting balance, and improved L side awareness this AM. Pt. initiating movement with L UE/LE from sitting position in chair. Pt. less anxious with consistent verbal reassurance & cueing. Will cont to progress pt as able per POC. Recommend IPRF upon d/c.     Time Calculation:    Time Calculation- OT     Row Name 09/29/22 1030             Time Calculation- OT    OT Received  On 09/29/22  -SD      OT Goal Re-Cert Due Date 10/02/22  -SD              Timed Charges    77051 - OT Therapeutic Exercise Minutes 12  -SD      01471 - OT Therapeutic Activity Minutes 12  -SD      60534 - OT Self Care/Mgmt Minutes 15  -SD              Total Minutes    Timed Charges Total Minutes 39  -SD       Total Minutes 39  -SD            User Key  (r) = Recorded By, (t) = Taken By, (c) = Cosigned By    Initials Name Provider Type    Linda Mcfarland OT Occupational Therapist              Therapy Charges for Today     Code Description Service Date Service Provider Modifiers Qty    81849500293 HC OT THER PROC EA 15 MIN 9/29/2022 Linda Dietrich, OT GO 1    57224289201 HC OT THERAPEUTIC ACT EA 15 MIN 9/29/2022 Linda Dietrich OT GO 1    56715346693 HC OT SELF CARE/MGMT/TRAIN EA 15 MIN 9/29/2022 Linda Dietrich OT GO 1               Linda Dietrich OT  9/29/2022    Electronically signed by Linda Dietrich OT at 09/29/22 1031

## 2022-10-03 NOTE — PLAN OF CARE
Goal Outcome Evaluation:  Plan of Care Reviewed With: patient        Progress: improving  Outcome Evaluation: Pt. with good participation BUE TE AROM, strength and coordination.  Good participation in BUE grooming and dressing task with compensatory training. Pt. with difficulty using BUE's at same time. Pt. continues to report diminished sensation LUE and may benefit from sensory stimulation education next session.  Pt. rolled mod of 2 L for bed pain placement.  Pt. attending to L and turning eyes to L with cueing. Continue OT POC.

## 2022-10-03 NOTE — THERAPY TREATMENT NOTE
Acute Care - Speech Language Pathology   Swallow Treatment Note  Yuriy     Patient Name: Catherine Carroll  : 1950  MRN: [id number]  Today's Date: 10/3/2022               Admit Date: 2022    Visit Dx:     ICD-10-CM ICD-9-CM   1. Oropharyngeal dysphagia  R13.12 787.22   2. Dysarthria  R47.1 784.51   3. Cognitive communication deficit  R41.841 799.52     Patient Active Problem List   Diagnosis   • Chronic kidney disease   • Closed right hip fracture- Comminuted intertrochanteric and supratrochanteric fracture of femur    • Closed fracture of distal end of right radius- comminuted    • Fall down stairs   • DM (diabetes mellitus), type 2 (formerly [facility])   • Depression   • Anemia, blood loss   • Hypokalemia   • Closed left hip fracture (formerly [facility])   • Essential hypertension   • RAVEN (obstructive sleep apnea)   • Hyperlipidemia   • Generalized anxiety disorder   • Fall   • Severe obesity (BMI 35.0-35.9 with comorbidity) (formerly [facility])   • S/p left hip fracture   • Acute ischemic right MCA stroke (formerly [facility])   • Dysphagia, unspecified type   • Acute ischemic stroke (formerly [facility])     Past Medical History:   Diagnosis Date   • Acid reflux    • Alopecia     severe   • Anxiety    • Chronic kidney disease     NKF classification) See under renal insuff - renal MD has limited her to 50 gms prot/day - this will signif affect her ability to be successful with WLS and may incr her risk of leak (gerardo as her prealb already low) - she still adamantly wishes to proceed.  Also has proteinuria   • Depression    • Diabetes mellitus (formerly [facility])    • Dyspepsia    • Dyspnea on exertion    • Fatigue    • Hyperlipidemia    • Hypertension    • Hypoalbuminemia     3.1   • Insomnia    • Joint pain    • Morbid obesity (formerly [facility])     (SUPER)   • Nephrolithiasis    • Obstructive sleep apnea     prior to her orig Band in  she had RAVEN on CPAP (309 lbs).     • Proteinuria    • PTSD (post-traumatic stress disorder)    • Renal insufficiency     related to nephrolithiasis.  GFR 45, (L)  40%, (R) 60%,  follows w/ Dr. Bunch   • Type 2 diabetes mellitus (HCC)     dx 1997, on insulin 10+ yrs, A1c 9.5 5/14/15.     • Urolithiasis      Past Surgical History:   Procedure Laterality Date   • BREAST LUMPECTOMY Right 2010    benign cyst   • CARDIAC CATHETERIZATION N/A 9/27/2022    Procedure: LEFT HEART CATH;  Surgeon: Mitch Underwood MD;  Location:  [facility];  Service: Cardiovascular;  Laterality: N/A;   • CHOLECYSTECTOMY     • CYSTOSCOPY     • CYSTOSCOPY W/ LITHOLAPAXY / EHL     • GASTRIC BANDING REMOVAL  11/2010    by [facility] for intolerance    • GASTRIC PORT CHANGE      lapband port reposition and shortening of lapband tubing after a port flip in 11/2007 by [facility]    • GASTRIC SLEEVE LAPAROSCOPIC      LAGB REG/ HHR 2/2007 by [facility]   • HIP TROCHANTERIC NAILING WITH INTRAMEDULLARY HIP SCREW Right 9/28/2016    Procedure: HIP TROCANTERIC NAILING WITH INTRAMEDULLARY HIP SCREW;  Surgeon: Deshawn Solis MD;  Location:  [facility];  Service:    • HIP TROCHANTERIC NAILING WITH INTRAMEDULLARY HIP SCREW Left 1/23/2019    Procedure: HIP TROCANTERIC NAILING LEFT;  Surgeon: Luther Thurston MD;  Location:  [facility];  Service: Orthopedics   • INTERVENTIONAL RADIOLOGY PROCEDURE N/A 9/21/2022    Procedure: IR MECHANICAL THROMBECTOMY - PRIMARY;  Surgeon: Randy Hair MD;  Location:  [facility];  Service: Interventional Radiology;  Laterality: N/A;   • KIDNEY SURGERY Left 2003    kidney surgery for embedded stent removal; multiple kidney stone removals and stent placements     • LAPAROSCOPIC CHOLECYSTECTOMY  2003    for gallstones   • LAPAROSCOPIC GASTRIC BANDING      Adjustable Gastric Band s/p LAGB APL/ HHR 8/2011 by [facility]    • SHOULDER SURGERY Left 12/27/2013   • TONSILLECTOMY  1957   • ULNA/RADIUS CLOSED REDUCTION Right 9/28/2016    Procedure: ULNA/RADIUS CLOSED REDUCTION;  Surgeon: Deshawn Solis MD;  Location:  [facility];  Service:        SLP Recommendation and Plan  SLP  Swallowing Diagnosis: oral dysphagia, pharyngeal dysphagia (10/03/22 1100)  SLP Diet Recommendation: mechanical soft with no mixed consistencies, nectar thick liquids (10/03/22 1100)  Recommended Precautions and Strategies: general aspiration precautions, small bites of food and sips of liquid, alternate between small bites of food and sips of liquid, assist with feeding (10/03/22 1100)  SLP Rec. for Method of Medication Administration: meds whole, meds crushed, with pudding or applesauce, as tolerated (10/03/22 1100)     Monitor for Signs of Aspiration: yes, notify SLP if any concerns (10/03/22 1100)     Swallow Criteria for Skilled Therapeutic Interventions Met: demonstrates skilled criteria (10/03/22 1100)  Anticipated Discharge Disposition (SLP): unknown, anticipate therapy at next level of care (10/03/22 1100)  Rehab Potential/Prognosis, Swallowing: good, to achieve stated therapy goals (10/03/22 1100)  Therapy Frequency (Swallow): 5 days per week (10/03/22 1100)  Predicted Duration Therapy Intervention (Days): until discharge (10/03/22 1100)        Daily Summary of Progress (SLP): progress toward functional goals as expected (10/03/22 1100)               Treatment Assessment (SLP): Saw for dysphagia treatment. Throat clear x1 w/ nectar-thick liquids via multiple large drinks, but no other s/s of aspiration observed. Pt impulsive at times. Educated on general aspiration precautions & taking smaller drinks. Pt able to demonstrate. Reviewed & completed dysphagia exercises. Cog/comm treatment completed. Pt participated well. Using paper to keep track of people's names & identifying info to assist w/ memory. Continue to address. (10/03/22 1100)  Plan for Continued Treatment (SLP): continue treatment per plan of care (10/03/22 1100)         Plan of Care Reviewed With: patient  Progress: no change      SWALLOW EVALUATION (last 72 hours)     SLP Adult Swallow Evaluation     Row Name 10/03/22 1100                    Rehab Evaluation    Document Type therapy note (daily note)  -RD        Subjective Information no complaints  -RD        Patient Observations alert;cooperative;agree to therapy  -RD        Patient/Family/Caregiver Comments/Observations none  -RD        Patient Effort good  -RD                  Pain    Additional Documentation Pain Scale: FACES Pre/Post-Treatment (Group)  -RD                  Pain Scale: FACES Pre/Post-Treatment    Pain: FACES Scale, Pretreatment 0-->no hurt  -RD        Posttreatment Pain Rating 0-->no hurt  -RD                  SLP Evaluation Clinical Impression    SLP Swallowing Diagnosis oral dysphagia;pharyngeal dysphagia  -RD        Functional Impact risk of aspiration/pneumonia  -RD        Rehab Potential/Prognosis, Swallowing good, to achieve stated therapy goals  -RD        Swallow Criteria for Skilled Therapeutic Interventions Met demonstrates skilled criteria  -RD                  SLP Treatment Clinical Impressions    Treatment Assessment (SLP) Saw for dysphagia treatment. Throat clear x1 w/ nectar-thick liquids via multiple large drinks, but no other s/s of aspiration observed. Pt impulsive at times. Educated on general aspiration precautions & taking smaller drinks. Pt able to demonstrate. Reviewed & completed dysphagia exercises. Cog/comm treatment completed. Pt participated well. Using paper to keep track of people's names & identifying info to assist w/ memory. Continue to address.  -RD        Daily Summary of Progress (SLP) progress toward functional goals as expected  -RD        Barriers to Overall Progress (SLP) Cognitive status  -RD        Plan for Continued Treatment (SLP) continue treatment per plan of care  -RD        Care Plan Review evaluation/treatment results reviewed;care plan/treatment goals reviewed;risks/benefits reviewed;current/potential barriers reviewed;patient/other agree to care plan  -RD                  Recommendations    Therapy Frequency (Swallow) 5 days per  week  -RD        Predicted Duration Therapy Intervention (Days) until discharge  -RD        SLP Diet Recommendation mechanical soft with no mixed consistencies;nectar thick liquids  -RD        Recommended Precautions and Strategies general aspiration precautions;small bites of food and sips of liquid;alternate between small bites of food and sips of liquid;assist with feeding  -RD        Oral Care Recommendations Oral Care BID/PRN  -RD        SLP Rec. for Method of Medication Administration meds whole;meds crushed;with pudding or applesauce;as tolerated  -RD        Monitor for Signs of Aspiration yes;notify SLP if any concerns  -RD        Anticipated Discharge Disposition (SLP) unknown;anticipate therapy at next level of care  -RD              User Key  (r) = Recorded By, (t) = Taken By, (c) = Cosigned By    Initials Name Effective Dates    RD Diane Flynn MS CCC-SLP 06/16/21 -                 EDUCATION  The patient has been educated in the following areas:   Dysphagia (Swallowing Impairment) Oral Care/Hydration Modified Diet Instruction.        SLP GOALS     Row Name 10/03/22 1100             (LTG) Patient will demonstrate functional swallow for    Diet Texture (Demonstrate functional swallow) regular textures  -RD      Liquid viscosity (Demonstrate functional swallow) thin liquids  -RD      Assumption (Demonstrate functional swallow) independently (over 90% accuracy)  -RD      Time Frame (Demonstrate functional swallow) by discharge  -RD      Progress/Outcomes (Demonstrate functional swallow) continuing progress toward goal  -RD              (STG) Patient will tolerate trials of    Consistencies Trialed (Tolerate trials) soft whole textures;nectar/ mildly thick liquids  -RD      Desired Outcome (Tolerate trials) without signs/symptoms of aspiration;with adequate oral prep/transit/clearance  -RD      Assumption (Tolerate trials) independently (over 90% accuracy)  -RD      Time Frame (Tolerate trials) by  discharge  -RD      Progress/Outcomes (Tolerate trials) continuing progress toward goal  -RD      Comment (Tolerate trials) throat clear x1 w/ larger consec drinks when drinking impulsively. No s/s when edu for smaller drinks & slower pace  -RD              (STG) Patient will tolerate therapeutic trials of    Consistencies Trialed (Tolerate therapeutic trials) regular textures;thin liquids  -RD      Desired Outcome (Tolerate therapeutic trials) without signs/symptoms of aspiration;with adequate oral prep/transit/clearance  -RD      [place] (Tolerate therapeutic trials) independently (over 90% accuracy)  -RD      Time Frame (Tolerate therapeutic trials) by discharge  -RD      Progress/Outcomes (Tolerate therapeutic trials) continuing progress toward goal  -RD      Comment (Tolerate therapeutic trials) s/s w/ thins  -RD              (STG) Lingual Strengthening Goal 1 (SLP)    Activity (Lingual Strengthening Goal 1, SLP) increase lingual tone/sensation/control/coordination/movement  -RD      Increase Lingual Tone/Sensation/Control/Coordination/Movement swallow trials;lingual resistance exercises  -RD      [place]/Accuracy (Lingual Strengthening Goal 1, SLP) independently (over 90% accuracy)  -RD      Time Frame (Lingual Strengthening Goal 1, SLP) short term goal (STG)  -RD      Progress/Outcomes (Lingual Strengthening Goal 1, SLP) continuing progress toward goal  -RD              (STG) Pharyngeal Strengthening Exercise Goal 1 (SLP)    Activity (Pharyngeal Strengthening Goal 1, SLP) increase timing;increase superior movement of the hyolaryngeal complex;increase anterior movement of the hyolaryngeal complex;increase closure at entrance to airway/closure of airway at glottis  -RD      Increase Timing prepping - 3 second prep or suck swallow or 3-step swallow  -RD      Increase Superior Movement of the Hyolaryngeal Complex effortful pitch glide (falsetto + pharyngeal squeeze)  -RD      Increase Anterior Movement  of the Hyolaryngeal Complex chin tuck against resistance (CTAR)  -RD      Increase Closure at Entrance to Airway/Closure of Airway at Glottis supraglottic swallow  -RD      [place]/Accuracy (Pharyngeal Strengthening Goal 1, SLP) with minimal cues (75-90% accuracy)  -RD      Time Frame (Pharyngeal Strengthening Goal 1, SLP) short term goal (STG)  -RD      Progress/Outcomes (Pharyngeal Strengthening Goal 1, SLP) continuing progress toward goal  -RD      Comment (Pharyngeal Strengthening Goal 1, SLP) reviewed & completed  -RD              Word Retrieval Skills Goal 1 (SLP)    Improve Word Retrieval Skills By Goal 1 (SLP) answer WH question with one word;supplying an antonym;supplying a synonym;80%;with minimal cues (75-90%)  -RD      Time Frame (Word Retrieval Goal 1, SLP) short term goal (STG)  -RD      Progress/Outcomes (Word Retrieval Goal 1, SLP) goal no longer appropriate  -RD      Comment (Word Retrieval Goal 1, SLP) R MCA CVA, cog not language  -RD              Awareness of Basic Personal Information Goal 1 (SLP)    Improve Awareness of Basic Personal Information Goal 1 (SLP) answering yes/no questions regarding personal/biographical information;answering open-ended questions regarding personal/biographical information;80%;with minimal cues (75-90%)  -RD      Time Frame (Awareness of Basic Personal Information Goal 1, SLP) short term goal (STG)  -RD      Progress (Awareness of Basic Personal Information Goal 1, SLP) 60%;with minimal cues (75-90%)  -RD      Progress/Outcomes (Awareness of Basic Personal Information Goal 1, SLP) continuing progress toward goal  -RD              Attention Goal 1 (SLP)    Improve Attention by Goal 1 (SLP) looking at speaker;attending to task;80%;with minimal cues (75-90%)  -RD      Time Frame (Attention Goal 1, SLP) short term goal (STG)  -RD      Progress (Attention Goal 1, SLP) 60%;with minimal cues (75-90%)  -RD      Progress/Outcomes (Attention Goal 1, SLP) continuing  progress toward goal  -RD              Orientation Goal 1 (SLP)    Improve Orientation Through Goal 1 (SLP) demonstrating orientation to day;demonstrating orientation to month;demonstrating orientation to year;demonstrating orientation to place;demonstrating orientation to disease/impairment;80%;with minimal cues (75-90%)  -RD      Time Frame (Orientation Goal 1, SLP) short term goal (STG)  -RD      Progress (Orientation Goal 1, SLP) 80%;with minimal cues (75-90%)  -RD      Progress/Outcomes (Orientation Goal 1, SLP) continuing progress toward goal  -RD      Comment (Orientation Goal 1, SLP) used whiteboard for date independently. needed cues for reason at hospital  -RD              Memory Skills Goal 1 (SLP)    Improve Memory Skills Through Goal 1 (SLP) recalling related word lists immediately;recalling unrelated word lists immediately;80%;with minimal cues (75-90%)  -RD      Time Frame (Memory Skills Goal 1, SLP) short term goal (STG)  -RD      Progress (Memory Skills Goal 1, SLP) 40%;with minimal cues (75-90%)  -RD      Progress/Outcomes (Memory Skills Goal 1, SLP) good progress toward goal;continuing progress toward goal  -RD              Organizational Skills Goal 1 (SLP)    Improve Thought Organization Through Goal 1 (SLP) completing a divergent naming task;completing a convergent naming task;80%;with minimal cues (75-90%)  -RD      Time Frame (Thought Organization Skills Goal 1, SLP) short term goal (STG)  -RD      Progress/Outcomes (Thought Organization Skills Goal 1, SLP) goal ongoing  -RD              Organizational Skills Goal 2 (SLP)    Improve Thought Organization Through Goal 2 (SLP) naming similarities and differences;80%;with minimal cues (75-90%)  -RD      Time Frame (Thought Organization Skills Goal 2, SLP) short term goal (STG)  -RD      Progress (Thought Organization Skills Goal 2, SLP) 50%;with minimal cues (75-90%)  -RD      Progress/Outcomes (Thought Organization Skills Goal 2, SLP) continuing  progress toward goal  -RD              SLC STG Goal 1 (SLP)    Additional Goal 1, SLP STG: Pt will participate in higher level language dx tx to determine need for additional intervention  -RD      Time Frame (Additional Goal 1, SLP) short term goal (STG)  -RD      Progress/Outcomes (Additional Goal 1, SLP) goal ongoing  -RD            User Key  (r) = Recorded By, (t) = Taken By, (c) = Cosigned By    Initials Name Provider Type    Diane Mccarthy MS CCC-SLP Speech and Language Pathologist                   Time Calculation:    Time Calculation- SLP     Row Name 10/03/22 1236             Time Calculation- SLP    SLP Start Time 1100  -RD      SLP Received On 10/03/22  -RD              Untimed Charges    [id number] Treatment/ST Modification Prosth Aug Alter  28  -RD      [id number] Treatment Swallow Minutes 30  -RD              Total Minutes    Untimed Charges Total Minutes 58  -RD       Total Minutes 58  -RD            User Key  (r) = Recorded By, (t) = Taken By, (c) = Cosigned By    Initials Name Provider Type    Diane Mccarthy MS CCC-SLP Speech and Language Pathologist                Therapy Charges for Today     Code Description Service Date Service Provider Modifiers Qty    11484404994 HC ST TREATMENT SWALLOW 2 10/3/2022 Diane Flynn MS CCC-SLP GN 1    28900165972 HC ST TREATMENT SPEECH 2 10/3/2022 Diane Flynn MS CCC-SLP GN 1        Patient was not wearing a face mask and did exhibit coughing during this therapy encounter.  Procedure performed was aerosolizing, involved close contact (within 6 feet for at least 15 minutes or longer), and did not involve contact with infectious secretions or specimens.  Therapist used appropriate personal protective equipment including gloves, standard procedure mask and eye protection.  Appropriate PPE was worn during the entire therapy session.  Hand hygiene was completed before and after therapy session.        MS MARIIA Main  10/3/2022

## 2022-10-03 NOTE — PLAN OF CARE
Goal Outcome Evaluation:           Progress: improving   Pt on RA and NSR on tele. She is oriented but forgetful. No acute events this shift. Will cont to monitor.

## 2022-10-04 LAB
ALBUMIN SERPL-MCNC: 2.7 G/DL (ref 3.5–5.2)
ANION GAP SERPL CALCULATED.3IONS-SCNC: 11 MMOL/L (ref 5–15)
BUN SERPL-MCNC: 23 MG/DL (ref 8–23)
BUN/CREAT SERPL: 13 (ref 7–25)
CALCIUM SPEC-SCNC: 8.5 MG/DL (ref 8.6–10.5)
CHLORIDE SERPL-SCNC: 103 MMOL/L (ref 98–107)
CO2 SERPL-SCNC: 22 MMOL/L (ref 22–29)
CREAT SERPL-MCNC: 1.77 MG/DL (ref 0.57–1)
EGFRCR SERPLBLD CKD-EPI 2021: 30.2 ML/MIN/1.73
GLUCOSE BLDC GLUCOMTR-MCNC: 148 MG/DL (ref 70–130)
GLUCOSE BLDC GLUCOMTR-MCNC: 197 MG/DL (ref 70–130)
GLUCOSE BLDC GLUCOMTR-MCNC: 225 MG/DL (ref 70–130)
GLUCOSE BLDC GLUCOMTR-MCNC: 280 MG/DL (ref 70–130)
GLUCOSE SERPL-MCNC: 137 MG/DL (ref 65–99)
PHOSPHATE SERPL-MCNC: 4.2 MG/DL (ref 2.5–4.5)
POTASSIUM SERPL-SCNC: 4.7 MMOL/L (ref 3.5–5.2)
SODIUM SERPL-SCNC: 136 MMOL/L (ref 136–145)

## 2022-10-04 PROCEDURE — 82962 GLUCOSE BLOOD TEST: CPT

## 2022-10-04 PROCEDURE — 63710000001 INSULIN LISPRO (HUMAN) PER 5 UNITS

## 2022-10-04 PROCEDURE — 97110 THERAPEUTIC EXERCISES: CPT

## 2022-10-04 PROCEDURE — 80069 RENAL FUNCTION PANEL: CPT | Performed by: INTERNAL MEDICINE

## 2022-10-04 PROCEDURE — 97530 THERAPEUTIC ACTIVITIES: CPT

## 2022-10-04 PROCEDURE — 99232 SBSQ HOSP IP/OBS MODERATE 35: CPT | Performed by: INTERNAL MEDICINE

## 2022-10-04 PROCEDURE — 92526 ORAL FUNCTION THERAPY: CPT

## 2022-10-04 PROCEDURE — 92507 TX SP LANG VOICE COMM INDIV: CPT

## 2022-10-04 RX ADMIN — CLOPIDOGREL BISULFATE 75 MG: 75 TABLET ORAL at 20:58

## 2022-10-04 RX ADMIN — SACUBITRIL AND VALSARTAN 1 TABLET: 24; 26 TABLET, FILM COATED ORAL at 20:58

## 2022-10-04 RX ADMIN — ASPIRIN 325 MG ORAL TABLET 325 MG: 325 PILL ORAL at 09:01

## 2022-10-04 RX ADMIN — INSULIN LISPRO 4 UNITS: 100 INJECTION, SOLUTION INTRAVENOUS; SUBCUTANEOUS at 11:50

## 2022-10-04 RX ADMIN — CARVEDILOL 3.12 MG: 3.12 TABLET, FILM COATED ORAL at 17:26

## 2022-10-04 RX ADMIN — TRAMADOL HYDROCHLORIDE 50 MG: 50 TABLET, COATED ORAL at 05:31

## 2022-10-04 RX ADMIN — Medication 10 ML: at 20:58

## 2022-10-04 RX ADMIN — BUPROPION HYDROCHLORIDE 300 MG: 150 TABLET, FILM COATED, EXTENDED RELEASE ORAL at 09:01

## 2022-10-04 RX ADMIN — INSULIN LISPRO 2 UNITS: 100 INJECTION, SOLUTION INTRAVENOUS; SUBCUTANEOUS at 20:58

## 2022-10-04 RX ADMIN — CARVEDILOL 3.12 MG: 3.12 TABLET, FILM COATED ORAL at 09:01

## 2022-10-04 RX ADMIN — INSULIN LISPRO 3 UNITS: 100 INJECTION, SOLUTION INTRAVENOUS; SUBCUTANEOUS at 17:26

## 2022-10-04 RX ADMIN — FAMOTIDINE 20 MG: 20 TABLET ORAL at 20:58

## 2022-10-04 RX ADMIN — OXYBUTYNIN CHLORIDE 5 MG: 5 TABLET, EXTENDED RELEASE ORAL at 09:01

## 2022-10-04 RX ADMIN — SACUBITRIL AND VALSARTAN 1 TABLET: 24; 26 TABLET, FILM COATED ORAL at 09:01

## 2022-10-04 RX ADMIN — ATORVASTATIN CALCIUM 80 MG: 40 TABLET, FILM COATED ORAL at 20:58

## 2022-10-04 RX ADMIN — Medication 10 ML: at 09:01

## 2022-10-04 RX ADMIN — SPIRONOLACTONE 25 MG: 25 TABLET ORAL at 09:01

## 2022-10-04 NOTE — THERAPY PROGRESS REPORT/RE-CERT
Patient Name: Catherine Carroll  : 1950    MRN: [id number]                              Today's Date: 10/4/2022       Admit Date: 2022    Visit Dx:     ICD-10-CM ICD-9-CM   1. Oropharyngeal dysphagia  R13.12 787.22   2. Dysarthria  R47.1 784.51   3. Cognitive communication deficit  R41.841 799.52     Patient Active Problem List   Diagnosis   • Chronic kidney disease   • Closed right hip fracture- Comminuted intertrochanteric and supratrochanteric fracture of femur    • Closed fracture of distal end of right radius- comminuted    • Fall down stairs   • DM (diabetes mellitus), type 2 ([facility])   • Depression   • Anemia, blood loss   • Hypokalemia   • Closed left hip fracture ([facility])   • Essential hypertension   • RAVEN (obstructive sleep apnea)   • Hyperlipidemia   • Generalized anxiety disorder   • Fall   • Severe obesity (BMI 35.0-35.9 with comorbidity) ([facility])   • S/p left hip fracture   • Acute ischemic right MCA stroke ([facility])   • Dysphagia, unspecified type   • Acute ischemic stroke ([facility])     Past Medical History:   Diagnosis Date   • Acid reflux    • Alopecia     severe   • Anxiety    • Chronic kidney disease     NKF classification) See under renal insuff - renal MD has limited her to 50 gms prot/day - this will signif affect her ability to be successful with WLS and may incr her risk of leak (gerardo as her prealb already low) - she still adamantly wishes to proceed.  Also has proteinuria   • Depression    • Diabetes mellitus ([facility])    • Dyspepsia    • Dyspnea on exertion    • Fatigue    • Hyperlipidemia    • Hypertension    • Hypoalbuminemia     3.1   • Insomnia    • Joint pain    • Morbid obesity ([facility])     (SUPER)   • Nephrolithiasis    • Obstructive sleep apnea     prior to her orig Band in  she had RAVEN on CPAP (309 lbs).     • Proteinuria    • PTSD (post-traumatic stress disorder)    • Renal insufficiency     related to nephrolithiasis.  GFR 45, (L) 40%, (R) 60%,  follows w/ Dr. Bunch   • Type 2 diabetes  mellitus (HCC)     dx 1997, on insulin 10+ yrs, A1c 9.5 5/14/15.     • Urolithiasis      Past Surgical History:   Procedure Laterality Date   • BREAST LUMPECTOMY Right 2010    benign cyst   • CARDIAC CATHETERIZATION N/A 9/27/2022    Procedure: LEFT HEART CATH;  Surgeon: Mitch Underwood MD;  Location:  [facility];  Service: Cardiovascular;  Laterality: N/A;   • CHOLECYSTECTOMY     • CYSTOSCOPY     • CYSTOSCOPY W/ LITHOLAPAXY / EHL     • GASTRIC BANDING REMOVAL  11/2010    by [facility] for intolerance    • GASTRIC PORT CHANGE      lapband port reposition and shortening of lapband tubing after a port flip in 11/2007 by [facility]    • GASTRIC SLEEVE LAPAROSCOPIC      LAGB REG/ HHR 2/2007 by [facility]   • HIP TROCHANTERIC NAILING WITH INTRAMEDULLARY HIP SCREW Right 9/28/2016    Procedure: HIP TROCANTERIC NAILING WITH INTRAMEDULLARY HIP SCREW;  Surgeon: Deshawn Solis MD;  Location:  [facility];  Service:    • HIP TROCHANTERIC NAILING WITH INTRAMEDULLARY HIP SCREW Left 1/23/2019    Procedure: HIP TROCANTERIC NAILING LEFT;  Surgeon: Luther Thurston MD;  Location:  [facility];  Service: Orthopedics   • INTERVENTIONAL RADIOLOGY PROCEDURE N/A 9/21/2022    Procedure: IR MECHANICAL THROMBECTOMY - PRIMARY;  Surgeon: Randy Hair MD;  Location:  [facility];  Service: Interventional Radiology;  Laterality: N/A;   • KIDNEY SURGERY Left 2003    kidney surgery for embedded stent removal; multiple kidney stone removals and stent placements     • LAPAROSCOPIC CHOLECYSTECTOMY  2003    for gallstones   • LAPAROSCOPIC GASTRIC BANDING      Adjustable Gastric Band s/p LAGB APL/ HHR 8/2011 by [facility]    • SHOULDER SURGERY Left 12/27/2013   • TONSILLECTOMY  1957   • ULNA/RADIUS CLOSED REDUCTION Right 9/28/2016    Procedure: ULNA/RADIUS CLOSED REDUCTION;  Surgeon: Deshawn Solis MD;  Location:  [facility];  Service:       General Information     Row Name 10/04/22 1459          Physical Therapy Time and Intention     Document Type progress note/recertification  -NS     Mode of Treatment individual therapy;physical therapy  -NS     Row Name 10/04/22 1459          General Information    Patient Profile Reviewed yes  -NS     Existing Precautions/Restrictions fall  LLE WBAT; L sided hemiplegia  -NS     Row Name 10/04/22 1459          Cognition    Orientation Status (Cognition) oriented to;person  -NS     Row Name 10/04/22 1459          Safety Issues, Functional Mobility    Safety Issues Affecting Function (Mobility) insight into deficits/self-awareness;problem-solving;safety precaution awareness;safety precautions follow-through/compliance;sequencing abilities  -NS     Impairments Affecting Function (Mobility) balance;cognition;coordination;endurance/activity tolerance;motor planning;strength;visual/perceptual;postural/trunk control;motor control  -NS     Cognitive Impairments, Mobility Safety/Performance problem-solving/reasoning;safety precaution awareness;safety precaution follow-through;sequencing abilities;judgment  -NS           User Key  (r) = Recorded By, (t) = Taken By, (c) = Cosigned By    Initials Name Provider Type    NS Tiffanie Mcduffie, PT Physical Therapist               Mobility     Row Name 10/04/22 1459          Bed Mobility    Bed Mobility rolling left;rolling right  -NS     Rolling Right [place] (Bed Mobility) maximum assist (25% patient effort);2 person assist  -NS     Scooting/Bridging [place] (Bed Mobility) maximum assist (25% patient effort);2 person assist  -NS     Assistive Device (Bed Mobility) bed rails;draw sheet  -NS     Comment, (Bed Mobility) VCs for sequencing.  -NS     Row Name 10/04/22 1459          Transfers    Comment, (Transfers) Lift to chair for safety. Patient agreeable to 2 reps STS with B feet blocked and UE support. Pt demonstrated improvement in initiation of STS, requiring Min A x2 for first stand then Mod A x2 due to posterior and L lateral lean. Pt demonstrated decent balance in  standing but declined standing further due to LBP. Anticipate patient will be appropriate to perform transfer to chair without lift next session.  -NS     Row Name 10/04/22 1459          Bed-Chair Transfer    Bed-Chair [place] (Transfers) dependent (less than 25% patient effort);2 person assist  -NS     Assistive Device (Bed-Chair Transfers) lift device  -NS     [facility] Name 10/04/22 1459          Sit-Stand Transfer    Sit-Stand [place] (Transfers) moderate assist (50% patient effort);2 person assist  -NS     Assistive Device (Sit-Stand Transfers) other (see comments)  BUE support  -NS           User Key  (r) = Recorded By, (t) = Taken By, (c) = Cosigned By    Initials Name Provider Type    Tiffanie Plunkett PT Physical Therapist               Obj/Interventions     [facility] Name 10/04/22 1459          Range of Motion Comprehensive    General Range of Motion bilateral lower extremity ROM WFL  -NS     [facility] Name 10/04/22 1459          Strength Comprehensive (MMT)    General Manual Muscle Testing (MMT) Assessment lower extremity strength deficits identified  -NS     Comment, General Manual Muscle Testing (MMT) Assessment RLE: grossly 4/5, LLE: grossly 3+/5  -[facility] Name 10/04/22 1459          Motor Skills    Therapeutic Exercise hip;knee;ankle  -NS     Row Name 10/04/22 1459          Hip (Therapeutic Exercise)    Hip (Therapeutic Exercise) strengthening exercise  -NS     Hip Strengthening (Therapeutic Exercise) bilateral;internal rotation;external rotation;10 repetitions;heel slides;5 repetitions  -[facility] Name 10/04/22 1459          Knee (Therapeutic Exercise)    Knee (Therapeutic Exercise) strengthening exercise  -NS     Knee AROM (Therapeutic Exercise) bilateral;SLR (straight leg raise);5 repetitions  -[facility] Name 10/04/22 1459          Ankle (Therapeutic Exercise)    Ankle (Therapeutic Exercise) AROM (active range of motion)  -NS     Ankle AROM (Therapeutic Exercise)  bilateral;dorsiflexion;plantarflexion;10 repetitions  -NS     Row Name 10/04/22 1459          Balance    Balance Assessment sitting static balance;sitting dynamic balance;sit to stand dynamic balance  -NS     Static Sitting Balance standby assist  -NS     Dynamic Sitting Balance contact guard  -NS     Position, Sitting Balance unsupported;sitting in chair  -NS     Static Standing Balance moderate assist;2-person assist  -NS     Position/Device Used, Standing Balance supported  -NS     Comment, Balance Pt's standing balance fluctuated between Min A x2 and Mod Ax2 due to L lateral lean.  -NS           User Key  (r) = Recorded By, (t) = Taken By, (c) = Cosigned By    Initials Name Provider Type    Tiffanie Plunkett, PT Physical Therapist               Goals/Plan     Row Name 10/04/22 1459          Bed Mobility Goal 1 (PT)    Activity/Assistive Device (Bed Mobility Goal 1, PT) sit to supine/supine to sit  -NS     [place] Level/Cues Needed (Bed Mobility Goal 1, PT) minimum assist (75% or more patient effort)  -NS     Time Frame (Bed Mobility Goal 1, PT) long term goal (LTG);10 days  -NS     Row Name 10/04/22 1459          Transfer Goal 1 (PT)    Activity/Assistive Device (Transfer Goal 1, PT) sit-to-stand/stand-to-sit;walker, platform;walker, lorraine  least restrictive AD  -NS     [place] Level/Cues Needed (Transfer Goal 1, PT) contact guard required  -NS     Time Frame (Transfer Goal 1, PT) long term goal (LTG);10 days  -NS     Progress/Outcome (Transfer Goal 1, PT) goal revised this date  -NS     Row Name 10/04/22 1459          Gait Training Goal 1 (PT)    Activity/Assistive Device (Gait Training Goal 1, PT) gait (walking locomotion);assistive device use;walker, platform;walker, lorraine  least restrictive AD  -NS     [place] Level (Gait Training Goal 1, PT) moderate assist (50-74% patient effort)  -NS     Distance (Gait Training Goal 1, PT) 20  -NS     Time Frame (Gait Training Goal 1, PT) long term goal  (LTG);10 days  -NS     Progress/Outcome (Gait Training Goal 1, PT) continuing progress toward goal  -NS     Row Name 10/04/22 1459          Therapy Assessment/Plan (PT)    Planned Therapy Interventions (PT) balance training;bed mobility training;gait training;home exercise program;neuromuscular re-education;patient/family education;strengthening;transfer training  -NS           User Key  (r) = Recorded By, (t) = Taken By, (c) = Cosigned By    Initials Name Provider Type    Tiffanie Plunkett, PT Physical Therapist               Clinical Impression     Row Name 10/04/22 1459          Pain    Pain Location - Side/Orientation Bilateral  -NS     Pain Location lower  -NS     Pain Location - back  -NS     Pain Intervention(s) Repositioned;Ambulation/increased activity;Nursing Notified  -NS     Row Name 10/04/22 1459          Pain Scale: FACES Pre/Post-Treatment    Pain: FACES Scale, Pretreatment 2-->hurts little bit  -NS     Posttreatment Pain Rating 2-->hurts little bit  -NS     Row Name 10/04/22 1459          Plan of Care Review    Plan of Care Reviewed With patient  -NS     Progress improving  -NS     Outcome Evaluation Patient is progressing well with PT, continues to be limited by weakness, decreased trunk control, and L sided neglect. She completed STS with BUE support, fluctuating between requiring Mod-Min A x2, demonstrating L lateral and posterior lean. Goals updated to reflect current level of function. Continue per PT POC. Recommend IRF at discharge.  -NS     Row Name 10/04/22 1459          Therapy Assessment/Plan (PT)    Rehab Potential (PT) good, to achieve stated therapy goals  -NS     Criteria for Skilled Interventions Met (PT) yes;meets criteria;skilled treatment is necessary  -NS     Therapy Frequency (PT) daily  -NS     Predicted Duration of Therapy Intervention (PT) 2 weeks  -NS     Row Name 10/04/22 1459          Vital Signs    Pre Systolic BP Rehab 113  -NS     Pre Treatment Diastolic BP 54  -NS      Post Systolic BP Rehab 108  -NS     Post Treatment Diastolic BP 81  -NS     Pretreatment Heart Rate (beats/min) 68  -NS     Posttreatment Heart Rate (beats/min) 67  -NS     Pre SpO2 (%) 96  -NS     O2 Delivery Pre Treatment room air  -NS     Post SpO2 (%) 96  -NS     O2 Delivery Post Treatment room air  -NS     Pre Patient Position Supine  -NS     Intra Patient Position Standing  -NS     Post Patient Position Sitting  -NS     Row Name 10/04/22 1459          Positioning and Restraints    Pre-Treatment Position in bed  -NS     Post Treatment Position chair  -NS     In Chair notified nsg;reclined;call light within reach;encouraged to call for assist;exit alarm on;legs elevated;waffle cushion;on mechanical lift sling;LUE elevated  -NS           User Key  (r) = Recorded By, (t) = Taken By, (c) = Cosigned By    Initials Name Provider Type    Tiffanie Plunkett, PT Physical Therapist               Outcome Measures     Row Name 10/04/22 1459          How much help from another person do you currently need...    Turning from your back to your side while in flat bed without using bedrails? 2  -NS     Moving from lying on back to sitting on the side of a flat bed without bedrails? 2  -NS     Moving to and from a bed to a chair (including a wheelchair)? 2  -NS     Standing up from a chair using your arms (e.g., wheelchair, bedside chair)? 2  -NS     Climbing 3-5 steps with a railing? 1  -NS     To walk in hospital room? 1  -NS     AM-PAC 6 Clicks Score (PT) 10  -NS     Highest level of mobility 4 --> Transferred to chair/commode  -NS     Row Name 10/04/22 1459          Modified [place] Scale    Modified [place] Scale 4 - Moderately severe disability.  Unable to walk without assistance, and unable to attend to own bodily needs without assistance.  -NS     Row Name 10/04/22 1459          Functional Assessment    Outcome Measure Options AM-PAC 6 Clicks Basic Mobility (PT);Modified [place]  -NS           User Key  (r) = Recorded By, (t)  = Taken By, (c) = Cosigned By    Initials Name Provider Type    Tiffanie Plunkett PT Physical Therapist                             Physical Therapy Education                 Title: PT OT SLP Therapies (In Progress)     Topic: Physical Therapy (In Progress)     Point: Mobility training (In Progress)     Learning Progress Summary           Patient Acceptance, E, VU,NR by NS at 10/4/2022 1552    Eager, E,D, NR by DM at 9/30/2022 1612    Acceptance, E, NR by AS at 9/27/2022 1038    Acceptance, E, NR by KG at 9/24/2022 1009    Acceptance, E,TB, VU,NR by AY at 9/22/2022 1501   Other Eager, E,D, NR by DM at 9/30/2022 1612                   Point: Home exercise program (In Progress)     Learning Progress Summary           Patient Acceptance, E, VU,NR by NS at 10/4/2022 1552    Eager, E,D, NR by DM at 9/30/2022 1612    Acceptance, E, NR by AS at 9/27/2022 1038    Acceptance, E, NR by KG at 9/24/2022 1009   Other Eager, E,D, NR by DM at 9/30/2022 1612                   Point: Body mechanics (In Progress)     Learning Progress Summary           Patient Acceptance, E, VU,NR by NS at 10/4/2022 1552    Eager, E,D, NR by DM at 9/30/2022 1612    Acceptance, E, NR by AS at 9/27/2022 1038    Acceptance, E, NR by KG at 9/24/2022 1009    Acceptance, E,TB, VU,NR by AY at 9/22/2022 1501   Other Eager, E,D, NR by DM at 9/30/2022 1612                   Point: Precautions (In Progress)     Learning Progress Summary           Patient Acceptance, E, VU,NR by NS at 10/4/2022 1552    Eager, E,D, NR by DM at 9/30/2022 1612    Acceptance, E, NR by AS at 9/27/2022 1038    Acceptance, E, NR by KG at 9/24/2022 1009    Acceptance, E,TB, VU,NR by AY at 9/22/2022 1501   Other Eager, E,D, NR by DM at 9/30/2022 1612                               User Key     Initials Effective Dates Name Provider Type Discipline    DM 06/16/21 -  Babs Hopkins, PT Physical Therapist PT    AS 06/16/21 -  Andreia Farris PTA Physical Therapist Assistant PT    KG  05/22/20 -  Alison Bellamy PT Physical Therapist PT    NS 06/16/21 -  Tiffanie Mcduffie, PT Physical Therapist PT    AY 11/10/20 -  Andreia Ballesteros PT Physical Therapist PT              PT Recommendation and Plan  Planned Therapy Interventions (PT): balance training, bed mobility training, gait training, home exercise program, neuromuscular re-education, patient/family education, strengthening, transfer training  Plan of Care Reviewed With: patient  Progress: improving  Outcome Evaluation: Patient is progressing well with PT, continues to be limited by weakness, decreased trunk control, and L sided neglect. She completed STS with BUE support, fluctuating between requiring Mod-Min A x2, demonstrating L lateral and posterior lean. Goals updated to reflect current level of function. Continue per PT POC. Recommend IRF at discharge.     Time Calculation:    PT Charges     Row Name 10/04/22 1459             Time Calculation    Start Time 1459  -NS      PT Received On 10/04/22  -NS      PT Goal Re-Cert Due Date 10/14/22  -NS              Timed Charges    81918 - PT Therapeutic Exercise Minutes 10  -NS      94682 - PT Therapeutic Activity Minutes 17  -NS              Total Minutes    Timed Charges Total Minutes 27  -NS       Total Minutes 27  -NS            User Key  (r) = Recorded By, (t) = Taken By, (c) = Cosigned By    Initials Name Provider Type    NS Tiffanie Mcduffie, PT Physical Therapist              Therapy Charges for Today     Code Description Service Date Service Provider Modifiers Qty    74725024289 HC PT THERAPEUTIC ACT EA 15 MIN 10/4/2022 Tiffanie Mcduffie, PT GP 1    52007729858 HC PT THER PROC EA 15 MIN 10/4/2022 Tiffanie Mcduffie, PT GP 1          PT G-Codes  Outcome Measure Options: AM-PAC 6 Clicks Basic Mobility (PT), Modified [place]  AM-PAC 6 Clicks Score (PT): 10  AM-PAC 6 Clicks Score (OT): 12  Modified [place] Scale: 4 - Moderately severe disability.  Unable to walk without assistance, and unable to attend to  own bodily needs without assistance.    Tiffanie Mcduffie, PT  10/4/2022

## 2022-10-04 NOTE — PLAN OF CARE
Goal Outcome Evaluation:  Plan of Care Reviewed With: patient        Progress: improving  Outcome Evaluation: Slept most of the night. No acute distress. Pain controlled with PRN meds SR on tele. Denies CP or SOA. VSS. Will cont with POC

## 2022-10-04 NOTE — THERAPY TREATMENT NOTE
Acute Care - Speech Language Pathology Treatment Note  [facility]     Patient Name: Catherine Carroll  : 1950  MRN: [id number]  Today's Date: 10/4/2022               Admit Date: 2022     Visit Dx:    ICD-10-CM ICD-9-CM   1. Oropharyngeal dysphagia  R13.12 787.22   2. Dysarthria  R47.1 784.51   3. Cognitive communication deficit  R41.841 799.52     Patient Active Problem List   Diagnosis   • Chronic kidney disease   • Closed right hip fracture- Comminuted intertrochanteric and supratrochanteric fracture of femur    • Closed fracture of distal end of right radius- comminuted    • Fall down stairs   • DM (diabetes mellitus), type 2 ([facility])   • Depression   • Anemia, blood loss   • Hypokalemia   • Closed left hip fracture ([facility])   • Essential hypertension   • RAVEN (obstructive sleep apnea)   • Hyperlipidemia   • Generalized anxiety disorder   • Fall   • Severe obesity (BMI 35.0-35.9 with comorbidity) ([facility])   • S/p left hip fracture   • Acute ischemic right MCA stroke ([facility])   • Dysphagia, unspecified type   • Acute ischemic stroke ([facility])     Past Medical History:   Diagnosis Date   • Acid reflux    • Alopecia     severe   • Anxiety    • Chronic kidney disease     NKF classification) See under renal insuff - renal MD has limited her to 50 gms prot/day - this will signif affect her ability to be successful with WLS and may incr her risk of leak (gerardo as her prealb already low) - she still adamantly wishes to proceed.  Also has proteinuria   • Depression    • Diabetes mellitus ([facility])    • Dyspepsia    • Dyspnea on exertion    • Fatigue    • Hyperlipidemia    • Hypertension    • Hypoalbuminemia     3.1   • Insomnia    • Joint pain    • Morbid obesity ([facility])     (SUPER)   • Nephrolithiasis    • Obstructive sleep apnea     prior to her orig Band in  she had RAVEN on CPAP (309 lbs).     • Proteinuria    • PTSD (post-traumatic stress disorder)    • Renal insufficiency     related to nephrolithiasis.  GFR 45, (L) 40%, (R)  60%,  follows w/ Dr. Bunch   • Type 2 diabetes mellitus (HCC)     dx 1997, on insulin 10+ yrs, A1c 9.5 5/14/15.     • Urolithiasis      Past Surgical History:   Procedure Laterality Date   • BREAST LUMPECTOMY Right 2010    benign cyst   • CARDIAC CATHETERIZATION N/A 9/27/2022    Procedure: LEFT HEART CATH;  Surgeon: Mitch Underwood MD;  Location:  [facility];  Service: Cardiovascular;  Laterality: N/A;   • CHOLECYSTECTOMY     • CYSTOSCOPY     • CYSTOSCOPY W/ LITHOLAPAXY / EHL     • GASTRIC BANDING REMOVAL  11/2010    by [facility] for intolerance    • GASTRIC PORT CHANGE      lapband port reposition and shortening of lapband tubing after a port flip in 11/2007 by [facility]    • GASTRIC SLEEVE LAPAROSCOPIC      LAGB REG/ HHR 2/2007 by [facility]   • HIP TROCHANTERIC NAILING WITH INTRAMEDULLARY HIP SCREW Right 9/28/2016    Procedure: HIP TROCANTERIC NAILING WITH INTRAMEDULLARY HIP SCREW;  Surgeon: Deshawn Solis MD;  Location:  [facility];  Service:    • HIP TROCHANTERIC NAILING WITH INTRAMEDULLARY HIP SCREW Left 1/23/2019    Procedure: HIP TROCANTERIC NAILING LEFT;  Surgeon: Luther Thurston MD;  Location:  [facility];  Service: Orthopedics   • INTERVENTIONAL RADIOLOGY PROCEDURE N/A 9/21/2022    Procedure: IR MECHANICAL THROMBECTOMY - PRIMARY;  Surgeon: Randy Hair MD;  Location:  [facility];  Service: Interventional Radiology;  Laterality: N/A;   • KIDNEY SURGERY Left 2003    kidney surgery for embedded stent removal; multiple kidney stone removals and stent placements     • LAPAROSCOPIC CHOLECYSTECTOMY  2003    for gallstones   • LAPAROSCOPIC GASTRIC BANDING      Adjustable Gastric Band s/p LAGB APL/ HHR 8/2011 by [facility]    • SHOULDER SURGERY Left 12/27/2013   • TONSILLECTOMY  1957   • ULNA/RADIUS CLOSED REDUCTION Right 9/28/2016    Procedure: ULNA/RADIUS CLOSED REDUCTION;  Surgeon: Deshawn Solis MD;  Location:  [facility];  Service:        SLP Recommendation and Plan  SLP Diagnosis: Pt  presents w/ at least mild dysarthria and mild to moderate cognitive communication deficts. Difficult to fully assess higher level language 2/2 severity of cog comm deficits. SLP will cont to f/u for tx and language dx tx to determine need for further intervention (10/04/22 0925)        Swallow Criteria for Skilled Therapeutic Interventions Met: demonstrates skilled criteria (10/04/22 0925)  SLC Criteria for Skilled Therapy Interventions Met: yes (10/04/22 0925)  Anticipated Discharge Disposition (SLP): unknown, anticipate therapy at next level of care (10/04/22 0925)     Therapy Frequency (Swallow): 5 days per week (10/04/22 0925)  Predicted Duration Therapy Intervention (Days): until discharge (10/04/22 0925)  Daily Summary of Progress (SLP): progress toward functional goals as expected (10/04/22 0925)        Communication Strategy Suggestions: eliminate distractions when speaking with patient (10/04/22 0925)  Treatment Assessment (SLP): Patient was seen this date for dysphagia tx and cognitive-communication. She comleted exercies with min cues. Cough with thin trial by sm cup sip. No s/s of aspiration with nectar thick liquids and soft solid trials. Continue to follow to address deficts in tx. (10/04/22 0925)  Plan for Continued Treatment (SLP): continue treatment per plan of care (10/04/22 0925)         SLP EVALUATION (last 72 hours)     SLP SLC Evaluation     Row Name 10/04/22 0925                   Communication Assessment/Intervention    Document Type therapy note (daily note)  -CH        Subjective Information no complaints  -CH        Patient Observations alert;cooperative;agree to therapy  -CH        Patient/Family/Caregiver Comments/Observations none present  -CH        Patient Effort good  -CH        Symptoms Noted During/After Treatment none  -CH                  General Information    Patient Profile Reviewed yes  -CH                  Pain    Additional Documentation Pain Scale: FACES Pre/Post-Treatment  (Group)  -                  Pain Scale: FACES Pre/Post-Treatment    Pain: FACES Scale, Pretreatment 0-->no hurt  -        Posttreatment Pain Rating 0-->no hurt  -CH                  SLP Evaluation Clinical Impressions    SLP Diagnosis Pt presents w/ at least mild dysarthria and mild to moderate cognitive communication deficts. Difficult to fully assess higher level language 2/2 severity of cog comm deficits. SLP will cont to f/u for tx and language dx tx to determine need for further intervention  -        Rehab Potential/Prognosis good  -        SLC Criteria for Skilled Therapy Interventions Met yes  -        Functional Impact difficulty in expressing complex messages;difficulty communicating in an emergency  -                  SLP Treatment Clinical Impressions    Treatment Assessment (SLP) Patient was seen this date for dysphagia tx and cognitive-communication. She comleted exercies with min cues. Cough with thin trial by sm cup sip. No s/s of aspiration with nectar thick liquids and soft solid trials. Continue to follow to address deficts in tx.  -        Daily Summary of Progress (SLP) progress toward functional goals as expected  -        Barriers to Overall Progress (SLP) Cognitive status  -        Plan for Continued Treatment (SLP) continue treatment per plan of care  -        Care Plan Review evaluation/treatment results reviewed;care plan/treatment goals reviewed  -                  Recommendations    Therapy Frequency (SLP SLC) 3 days per week  -        Predicted Duration Therapy Intervention (Days) until discharge  -        Anticipated Discharge Disposition (SLP) unknown;anticipate therapy at next level of care  -        Communication Strategy Suggestions eliminate distractions when speaking with patient  -        Further Assessment Needed in the Following Areas verbal expression;graphic expression;higher-level cognitive-linguistic  -              User Key  (r) = Recorded  By, (t) = Taken By, (c) = Cosigned By    Initials Name Effective Dates    CH Samantha Colbert, MS CCC-SLP 06/16/21 -                    EDUCATION  The patient has been educated in the following areas:     Cognitive Impairment Communication Impairment.           SLP GOALS     Row Name 10/04/22 0925 10/03/22 1100          (LTG) Patient will demonstrate functional swallow for    Diet Texture (Demonstrate functional swallow) regular textures  -CH regular textures  -RD     Liquid viscosity (Demonstrate functional swallow) thin liquids  -CH thin liquids  -RD     [place] (Demonstrate functional swallow) independently (over 90% accuracy)  -CH independently (over 90% accuracy)  -RD     Time Frame (Demonstrate functional swallow) by discharge  -CH by discharge  -RD     Progress/Outcomes (Demonstrate functional swallow) continuing progress toward goal  -CH continuing progress toward goal  -RD     Comment (Demonstrate functional swallow) cough w straw sips of thin liquids  - --            (STG) Patient will tolerate trials of    Consistencies Trialed (Tolerate trials) soft whole textures;nectar/ mildly thick liquids  -CH soft whole textures;nectar/ mildly thick liquids  -RD     Desired Outcome (Tolerate trials) without signs/symptoms of aspiration;with adequate oral prep/transit/clearance  -CH without signs/symptoms of aspiration;with adequate oral prep/transit/clearance  -RD     [place] (Tolerate trials) independently (over 90% accuracy)  -CH independently (over 90% accuracy)  -RD     Time Frame (Tolerate trials) by discharge  - by discharge  -RD     Progress/Outcomes (Tolerate trials) continuing progress toward goal  - continuing progress toward goal  -RD     Comment (Tolerate trials) No s/s of aspiration with soft whole or Nectar thick liquids by cup  -CH throat clear x1 w/ larger consec drinks when drinking impulsively. No s/s when edu for smaller drinks & slower pace  -RD            (STG) Patient will  tolerate therapeutic trials of    Consistencies Trialed (Tolerate therapeutic trials) regular textures;thin liquids  -CH regular textures;thin liquids  -RD     Desired Outcome (Tolerate therapeutic trials) without signs/symptoms of aspiration;with adequate oral prep/transit/clearance  -CH without signs/symptoms of aspiration;with adequate oral prep/transit/clearance  -RD     [place] (Tolerate therapeutic trials) independently (over 90% accuracy)  -CH independently (over 90% accuracy)  -RD     Time Frame (Tolerate therapeutic trials) by discharge  -CH by discharge  -RD     Progress/Outcomes (Tolerate therapeutic trials) continuing progress toward goal  -CH continuing progress toward goal  -RD     Comment (Tolerate therapeutic trials) s/s w/ thins  -CH s/s w/ thins  -RD            (STG) Lingual Strengthening Goal 1 (SLP)    Activity (Lingual Strengthening Goal 1, SLP) increase lingual tone/sensation/control/coordination/movement  -CH increase lingual tone/sensation/control/coordination/movement  -RD     Increase Lingual Tone/Sensation/Control/Coordination/Movement swallow trials;lingual resistance exercises  -CH swallow trials;lingual resistance exercises  -RD     [place]/Accuracy (Lingual Strengthening Goal 1, SLP) independently (over 90% accuracy)  -CH independently (over 90% accuracy)  -RD     Time Frame (Lingual Strengthening Goal 1, SLP) short term goal (STG)  -CH short term goal (STG)  -RD     Progress/Outcomes (Lingual Strengthening Goal 1, SLP) continuing progress toward goal  -CH continuing progress toward goal  -RD     Comment (Lingual Strengthening Goal 1, SLP) reviewed and completed  - --            (STG) Pharyngeal Strengthening Exercise Goal 1 (SLP)    Activity (Pharyngeal Strengthening Goal 1, SLP) increase timing;increase superior movement of the hyolaryngeal complex;increase anterior movement of the hyolaryngeal complex;increase closure at entrance to airway/closure of airway at glottis   -CH increase timing;increase superior movement of the hyolaryngeal complex;increase anterior movement of the hyolaryngeal complex;increase closure at entrance to airway/closure of airway at glottis  -RD     Increase Timing prepping - 3 second prep or suck swallow or 3-step swallow  -CH prepping - 3 second prep or suck swallow or 3-step swallow  -RD     Increase Superior Movement of the Hyolaryngeal Complex effortful pitch glide (falsetto + pharyngeal squeeze)  -CH effortful pitch glide (falsetto + pharyngeal squeeze)  -RD     Increase Anterior Movement of the Hyolaryngeal Complex chin tuck against resistance (CTAR)  -CH chin tuck against resistance (CTAR)  -RD     Increase Closure at Entrance to Airway/Closure of Airway at Glottis supraglottic swallow  -CH supraglottic swallow  -RD     [place]/Accuracy (Pharyngeal Strengthening Goal 1, SLP) with minimal cues (75-90% accuracy)  -CH with minimal cues (75-90% accuracy)  -RD     Time Frame (Pharyngeal Strengthening Goal 1, SLP) short term goal (STG)  -CH short term goal (STG)  -RD     Progress/Outcomes (Pharyngeal Strengthening Goal 1, SLP) continuing progress toward goal  -CH continuing progress toward goal  -RD     Comment (Pharyngeal Strengthening Goal 1, SLP) reviewed & completed  -CH reviewed & completed  -RD            Word Retrieval Skills Goal 1 (SLP)    Improve Word Retrieval Skills By Goal 1 (SLP) answer WH question with one word;supplying an antonym;supplying a synonym;80%;with minimal cues (75-90%)  -CH answer WH question with one word;supplying an antonym;supplying a synonym;80%;with minimal cues (75-90%)  -RD     Time Frame (Word Retrieval Goal 1, SLP) short term goal (STG)  -CH short term goal (STG)  -RD     Progress/Outcomes (Word Retrieval Goal 1, SLP) goal no longer appropriate  -CH goal no longer appropriate  -RD     Comment (Word Retrieval Goal 1, SLP) R MCA CVA, cog not language  -CH R MCA CVA, cog not language  -RD            Awareness of  Basic Personal Information Goal 1 (SLP)    Improve Awareness of Basic Personal Information Goal 1 (SLP) answering yes/no questions regarding personal/biographical information;answering open-ended questions regarding personal/biographical information;80%;with minimal cues (75-90%)  -CH answering yes/no questions regarding personal/biographical information;answering open-ended questions regarding personal/biographical information;80%;with minimal cues (75-90%)  -RD     Time Frame (Awareness of Basic Personal Information Goal 1, SLP) short term goal (STG)  -CH short term goal (STG)  -RD     Progress (Awareness of Basic Personal Information Goal 1, SLP) 70%;with minimal cues (75-90%)  -CH 60%;with minimal cues (75-90%)  -RD     Progress/Outcomes (Awareness of Basic Personal Information Goal 1, SLP) continuing progress toward goal  -CH continuing progress toward goal  -RD            Attention Goal 1 (SLP)    Improve Attention by Goal 1 (SLP) looking at speaker;attending to task;80%;with minimal cues (75-90%)  -CH looking at speaker;attending to task;80%;with minimal cues (75-90%)  -RD     Time Frame (Attention Goal 1, SLP) short term goal (STG)  -CH short term goal (STG)  -RD     Progress (Attention Goal 1, SLP) 60%;with minimal cues (75-90%)  -CH 60%;with minimal cues (75-90%)  -RD     Progress/Outcomes (Attention Goal 1, SLP) continuing progress toward goal  -CH continuing progress toward goal  -RD     Comment (Attention Goal 1, SLP) mod cues  - --            Orientation Goal 1 (SLP)    Improve Orientation Through Goal 1 (SLP) demonstrating orientation to day;demonstrating orientation to month;demonstrating orientation to year;demonstrating orientation to place;demonstrating orientation to disease/impairment;80%;with minimal cues (75-90%)  - demonstrating orientation to day;demonstrating orientation to month;demonstrating orientation to year;demonstrating orientation to place;demonstrating orientation to  disease/impairment;80%;with minimal cues (75-90%)  -RD     Time Frame (Orientation Goal 1, SLP) short term goal (STG)  -CH short term goal (STG)  -RD     Progress (Orientation Goal 1, SLP) 80%;with minimal cues (75-90%)  -CH 80%;with minimal cues (75-90%)  -RD     Progress/Outcomes (Orientation Goal 1, SLP) continuing progress toward goal  -CH continuing progress toward goal  -RD     Comment (Orientation Goal 1, SLP) -- used whiteboard for date independently. needed cues for reason at hospital  -RD            Memory Skills Goal 1 (SLP)    Improve Memory Skills Through Goal 1 (SLP) recalling related word lists immediately;recalling unrelated word lists immediately;80%;with minimal cues (75-90%)  -CH recalling related word lists immediately;recalling unrelated word lists immediately;80%;with minimal cues (75-90%)  -RD     Time Frame (Memory Skills Goal 1, SLP) short term goal (STG)  -CH short term goal (STG)  -RD     Progress (Memory Skills Goal 1, SLP) 50%;with minimal cues (75-90%)  -CH 40%;with minimal cues (75-90%)  -RD     Progress/Outcomes (Memory Skills Goal 1, SLP) continuing progress toward goal  -CH good progress toward goal;continuing progress toward goal  -RD     Comment (Memory Skills Goal 1, SLP) 3 word lists  - --            Organizational Skills Goal 1 (SLP)    Improve Thought Organization Through Goal 1 (SLP) completing a divergent naming task;completing a convergent naming task;80%;with minimal cues (75-90%)  -CH completing a divergent naming task;completing a convergent naming task;80%;with minimal cues (75-90%)  -RD     Time Frame (Thought Organization Skills Goal 1, SLP) short term goal (STG)  -CH short term goal (STG)  -RD     Progress (Thought Organization Skills Goal 1, SLP) 50%;with minimal cues (75-90%)  -CH --     Progress/Outcomes (Thought Organization Skills Goal 1, SLP) continuing progress toward goal  -CH goal ongoing  -RD     Comment (Thought Organization Skills Goal 1, SLP) divergent  naming  -CH --            Organizational Skills Goal 2 (SLP)    Improve Thought Organization Through Goal 2 (SLP) naming similarities and differences;80%;with minimal cues (75-90%)  -CH naming similarities and differences;80%;with minimal cues (75-90%)  -RD     Time Frame (Thought Organization Skills Goal 2, SLP) short term goal (STG)  -CH short term goal (STG)  -RD     Progress (Thought Organization Skills Goal 2, SLP) 50%;with minimal cues (75-90%)  -CH 50%;with minimal cues (75-90%)  -RD     Progress/Outcomes (Thought Organization Skills Goal 2, SLP) continuing progress toward goal  -CH continuing progress toward goal  -RD            SLC STG Goal 1 (SLP)    Additional Goal 1, SLP STG: Pt will participate in higher level language dx tx to determine need for additional intervention  -CH STG: Pt will participate in higher level language dx tx to determine need for additional intervention  -RD     Time Frame (Additional Goal 1, SLP) short term goal (STG)  -CH short term goal (STG)  -RD     Progress/Outcomes (Additional Goal 1, SLP) goal ongoing  -CH goal ongoing  -RD           User Key  (r) = Recorded By, (t) = Taken By, (c) = Cosigned By    Initials Name Provider Type    Diane Mccarthy MS CCC-SLP Speech and Language Pathologist    Samantha Aldridge MS CCC-SLP Speech and Language Pathologist                        Time Calculation:      Time Calculation- SLP     Row Name 10/04/22 1312             Time Calculation- SLP    SLP Start Time 0925  -      SLP Received On 10/04/22  -              Untimed Charges    82577-VP Treatment/ST Modification Prosth Aug Alter 25  -      59578-YD Treatment Swallow Minutes 40  -CH              Total Minutes    Untimed Charges Total Minutes 65  -CH       Total Minutes 65  -CH            User Key  (r) = Recorded By, (t) = Taken By, (c) = Cosigned By    Initials Name Provider Type    Samantha Aldridge, MS CCC-SLP Speech and Language Pathologist                Therapy  Charges for Today     Code Description Service Date Service Provider Modifiers Qty    81325303629 HC ST TREATMENT SWALLOW 3 10/4/2022 Samantha Colbert, MS CCC-SLP GN 1    [id number] HC ST TREATMENT SPEECH 2 10/4/2022 Samantha Colbert, MS CCC-SLP GN 1                     Samantha Colbert, MS CCC-SLP  10/4/2022 and Acute Care - Speech Language Pathology   Swallow Treatment Note [facility]     Patient Name: Catherine Carroll  : 1950  MRN: [id number]  Today's Date: 10/4/2022               Admit Date: 2022    Visit Dx:     ICD-10-CM ICD-9-CM   1. Oropharyngeal dysphagia  R13.12 787.22   2. Dysarthria  R47.1 784.51   3. Cognitive communication deficit  R41.841 799.52     Patient Active Problem List   Diagnosis   • Chronic kidney disease   • Closed right hip fracture- Comminuted intertrochanteric and supratrochanteric fracture of femur    • Closed fracture of distal end of right radius- comminuted    • Fall down stairs   • DM (diabetes mellitus), type 2 ([facility])   • Depression   • Anemia, blood loss   • Hypokalemia   • Closed left hip fracture ([facility])   • Essential hypertension   • RAVEN (obstructive sleep apnea)   • Hyperlipidemia   • Generalized anxiety disorder   • Fall   • Severe obesity (BMI 35.0-35.9 with comorbidity) ([facility])   • S/p left hip fracture   • Acute ischemic right MCA stroke ([facility])   • Dysphagia, unspecified type   • Acute ischemic stroke ([facility])     Past Medical History:   Diagnosis Date   • Acid reflux    • Alopecia     severe   • Anxiety    • Chronic kidney disease     NKF classification) See under renal insuff - renal MD has limited her to 50 gms prot/day - this will signif affect her ability to be successful with WLS and may incr her risk of leak (gerardo as her prealb already low) - she still adamantly wishes to proceed.  Also has proteinuria   • Depression    • Diabetes mellitus ([facility])    • Dyspepsia    • Dyspnea on exertion    • Fatigue    • Hyperlipidemia    • Hypertension    • Hypoalbuminemia      3.1   • Insomnia    • Joint pain    • Morbid obesity (HCC)     (SUPER)   • Nephrolithiasis    • Obstructive sleep apnea     prior to her orig Band in 2007 she had RAVEN on CPAP (309 lbs).     • Proteinuria    • PTSD (post-traumatic stress disorder)    • Renal insufficiency     related to nephrolithiasis.  GFR 45, (L) 40%, (R) 60%,  follows w/ Dr. Bunch   • Type 2 diabetes mellitus (HCC)     dx 1997, on insulin 10+ yrs, A1c 9.5 5/14/15.     • Urolithiasis      Past Surgical History:   Procedure Laterality Date   • BREAST LUMPECTOMY Right 2010    benign cyst   • CARDIAC CATHETERIZATION N/A 9/27/2022    Procedure: LEFT HEART CATH;  Surgeon: Mitch Underwood MD;  Location:  [facility];  Service: Cardiovascular;  Laterality: N/A;   • CHOLECYSTECTOMY     • CYSTOSCOPY     • CYSTOSCOPY W/ LITHOLAPAXY / EHL     • GASTRIC BANDING REMOVAL  11/2010    by [facility] for intolerance    • GASTRIC PORT CHANGE      lapband port reposition and shortening of lapband tubing after a port flip in 11/2007 by [facility]    • GASTRIC SLEEVE LAPAROSCOPIC      LAGB REG/ HHR 2/2007 by [facility]   • HIP TROCHANTERIC NAILING WITH INTRAMEDULLARY HIP SCREW Right 9/28/2016    Procedure: HIP TROCANTERIC NAILING WITH INTRAMEDULLARY HIP SCREW;  Surgeon: Deshawn Solis MD;  Location:  [facility];  Service:    • HIP TROCHANTERIC NAILING WITH INTRAMEDULLARY HIP SCREW Left 1/23/2019    Procedure: HIP TROCANTERIC NAILING LEFT;  Surgeon: Luther Thurston MD;  Location:  [facility];  Service: Orthopedics   • INTERVENTIONAL RADIOLOGY PROCEDURE N/A 9/21/2022    Procedure: IR MECHANICAL THROMBECTOMY - PRIMARY;  Surgeon: Randy Hair MD;  Location:  [facility];  Service: Interventional Radiology;  Laterality: N/A;   • KIDNEY SURGERY Left 2003    kidney surgery for embedded stent removal; multiple kidney stone removals and stent placements     • LAPAROSCOPIC CHOLECYSTECTOMY  2003    for gallstones   • LAPAROSCOPIC GASTRIC BANDING       Adjustable Gastric Band s/p LAGB APL/ HHR 8/2011 by [facility]    • SHOULDER SURGERY Left 12/27/2013   • TONSILLECTOMY  1957   • ULNA/RADIUS CLOSED REDUCTION Right 9/28/2016    Procedure: ULNA/RADIUS CLOSED REDUCTION;  Surgeon: Deshawn Solis MD;  Location: [facility] OR;  Service:        SLP Recommendation and Plan  SLP Swallowing Diagnosis: oral dysphagia, pharyngeal dysphagia (10/04/22 0925)  SLP Diet Recommendation: mechanical soft with no mixed consistencies, nectar thick liquids (10/04/22 0925)  Recommended Precautions and Strategies: general aspiration precautions, small bites of food and sips of liquid, alternate between small bites of food and sips of liquid, assist with feeding (10/04/22 0925)  SLP Rec. for Method of Medication Administration: meds whole, meds crushed, with pudding or applesauce, as tolerated (10/04/22 0925)     Monitor for Signs of Aspiration: yes, notify SLP if any concerns (10/04/22 0925)     Swallow Criteria for Skilled Therapeutic Interventions Met: demonstrates skilled criteria (10/04/22 0925)  Anticipated Discharge Disposition (SLP): unknown, anticipate therapy at next level of care (10/04/22 0925)  Rehab Potential/Prognosis, Swallowing: good, to achieve stated therapy goals (10/04/22 0925)  Therapy Frequency (Swallow): 5 days per week (10/04/22 0925)  Predicted Duration Therapy Intervention (Days): until discharge (10/04/22 0925)        Daily Summary of Progress (SLP): progress toward functional goals as expected (10/04/22 0925)            Communication Strategy Suggestions: eliminate distractions when speaking with patient (10/04/22 0925)  Treatment Assessment (SLP): Patient was seen this date for dysphagia tx and cognitive-communication. She comleted exercies with min cues. Cough with thin trial by sm cup sip. No s/s of aspiration with nectar thick liquids and soft solid trials. Continue to follow to address deficts in tx. (10/04/22 0925)  Plan for Continued Treatment (SLP): continue  treatment per plan of care (10/04/22 0925)                SWALLOW EVALUATION (last 72 hours)     SLP Adult Swallow Evaluation     Row Name 10/04/22 0925 10/03/22 1100                Rehab Evaluation    Document Type -- therapy note (daily note)  -RD       Subjective Information -- no complaints  -RD       Patient Observations -- alert;cooperative;agree to therapy  -RD       Patient/Family/Caregiver Comments/Observations -- none  -RD       Patient Effort -- good  -RD                Pain    Additional Documentation -- Pain Scale: FACES Pre/Post-Treatment (Group)  -RD                Pain Scale: FACES Pre/Post-Treatment    Pain: FACES Scale, Pretreatment -- 0-->no hurt  -RD       Posttreatment Pain Rating -- 0-->no hurt  -RD                SLP Evaluation Clinical Impression    SLP Swallowing Diagnosis oral dysphagia;pharyngeal dysphagia  -CH oral dysphagia;pharyngeal dysphagia  -RD       Functional Impact risk of aspiration/pneumonia  -CH risk of aspiration/pneumonia  -RD       Rehab Potential/Prognosis, Swallowing good, to achieve stated therapy goals  -CH good, to achieve stated therapy goals  -RD       Swallow Criteria for Skilled Therapeutic Interventions Met demonstrates skilled criteria  -CH demonstrates skilled criteria  -RD                SLP Treatment Clinical Impressions    Treatment Assessment (SLP) -- Saw for dysphagia treatment. Throat clear x1 w/ nectar-thick liquids via multiple large drinks, but no other s/s of aspiration observed. Pt impulsive at times. Educated on general aspiration precautions & taking smaller drinks. Pt able to demonstrate. Reviewed & completed dysphagia exercises. Cog/comm treatment completed. Pt participated well. Using paper to keep track of people's names & identifying info to assist w/ memory. Continue to address.  -RD       Daily Summary of Progress (SLP) -- progress toward functional goals as expected  -RD       Barriers to Overall Progress (SLP) -- Cognitive status  -RD        Plan for Continued Treatment (SLP) -- continue treatment per plan of care  -RD       Care Plan Review -- evaluation/treatment results reviewed;care plan/treatment goals reviewed;risks/benefits reviewed;current/potential barriers reviewed;patient/other agree to care plan  -RD                Recommendations    Therapy Frequency (Swallow) 5 days per week  - 5 days per week  -RD       Predicted Duration Therapy Intervention (Days) -- until discharge  -RD       SLP Diet Recommendation mechanical soft with no mixed consistencies;nectar thick liquids  -CH mechanical soft with no mixed consistencies;nectar thick liquids  -RD       Recommended Precautions and Strategies general aspiration precautions;small bites of food and sips of liquid;alternate between small bites of food and sips of liquid;assist with feeding  -CH general aspiration precautions;small bites of food and sips of liquid;alternate between small bites of food and sips of liquid;assist with feeding  -RD       Oral Care Recommendations Oral Care BID/PRN  -CH Oral Care BID/PRN  -RD       SLP Rec. for Method of Medication Administration meds whole;meds crushed;with pudding or applesauce;as tolerated  - meds whole;meds crushed;with pudding or applesauce;as tolerated  -RD       Monitor for Signs of Aspiration yes;notify SLP if any concerns  - yes;notify SLP if any concerns  -RD       Anticipated Discharge Disposition (SLP) -- unknown;anticipate therapy at next level of care  -RD             User Key  (r) = Recorded By, (t) = Taken By, (c) = Cosigned By    Initials Name Effective Dates    RD Diane Flynn MS CCC-SLP 06/16/21 -      Samantha Colbert MS CCC-SLP 06/16/21 -                 EDUCATION  The patient has been educated in the following areas:   Home Exercise Program (HEP) Dysphagia (Swallowing Impairment) Oral Care/Hydration Modified Diet Instruction.        SLP GOALS     Row Name 10/04/22 0925 10/03/22 1100          (LTG) Patient will  demonstrate functional swallow for    Diet Texture (Demonstrate functional swallow) regular textures  -CH regular textures  -RD     Liquid viscosity (Demonstrate functional swallow) thin liquids  -CH thin liquids  -RD     [place] (Demonstrate functional swallow) independently (over 90% accuracy)  -CH independently (over 90% accuracy)  -RD     Time Frame (Demonstrate functional swallow) by discharge  -CH by discharge  -RD     Progress/Outcomes (Demonstrate functional swallow) continuing progress toward goal  -CH continuing progress toward goal  -RD     Comment (Demonstrate functional swallow) cough w straw sips of thin liquids  -CH --            (STG) Patient will tolerate trials of    Consistencies Trialed (Tolerate trials) soft whole textures;nectar/ mildly thick liquids  -CH soft whole textures;nectar/ mildly thick liquids  -RD     Desired Outcome (Tolerate trials) without signs/symptoms of aspiration;with adequate oral prep/transit/clearance  -CH without signs/symptoms of aspiration;with adequate oral prep/transit/clearance  -RD     [place] (Tolerate trials) independently (over 90% accuracy)  -CH independently (over 90% accuracy)  -RD     Time Frame (Tolerate trials) by discharge  -CH by discharge  -RD     Progress/Outcomes (Tolerate trials) continuing progress toward goal  -CH continuing progress toward goal  -RD     Comment (Tolerate trials) No s/s of aspiration with soft whole or Nectar thick liquids by cup  -CH throat clear x1 w/ larger consec drinks when drinking impulsively. No s/s when edu for smaller drinks & slower pace  -RD            (STG) Patient will tolerate therapeutic trials of    Consistencies Trialed (Tolerate therapeutic trials) regular textures;thin liquids  -CH regular textures;thin liquids  -RD     Desired Outcome (Tolerate therapeutic trials) without signs/symptoms of aspiration;with adequate oral prep/transit/clearance  -CH without signs/symptoms of aspiration;with adequate oral  prep/transit/clearance  -RD     [place] (Tolerate therapeutic trials) independently (over 90% accuracy)  -CH independently (over 90% accuracy)  -RD     Time Frame (Tolerate therapeutic trials) by discharge  -CH by discharge  -RD     Progress/Outcomes (Tolerate therapeutic trials) continuing progress toward goal  -CH continuing progress toward goal  -RD     Comment (Tolerate therapeutic trials) s/s w/ thins  -CH s/s w/ thins  -RD            (STG) Lingual Strengthening Goal 1 (SLP)    Activity (Lingual Strengthening Goal 1, SLP) increase lingual tone/sensation/control/coordination/movement  -CH increase lingual tone/sensation/control/coordination/movement  -RD     Increase Lingual Tone/Sensation/Control/Coordination/Movement swallow trials;lingual resistance exercises  -CH swallow trials;lingual resistance exercises  -RD     [place]/Accuracy (Lingual Strengthening Goal 1, SLP) independently (over 90% accuracy)  -CH independently (over 90% accuracy)  -RD     Time Frame (Lingual Strengthening Goal 1, SLP) short term goal (STG)  -CH short term goal (STG)  -RD     Progress/Outcomes (Lingual Strengthening Goal 1, SLP) continuing progress toward goal  -CH continuing progress toward goal  -RD     Comment (Lingual Strengthening Goal 1, SLP) reviewed and completed  - --            (STG) Pharyngeal Strengthening Exercise Goal 1 (SLP)    Activity (Pharyngeal Strengthening Goal 1, SLP) increase timing;increase superior movement of the hyolaryngeal complex;increase anterior movement of the hyolaryngeal complex;increase closure at entrance to airway/closure of airway at glottis  -CH increase timing;increase superior movement of the hyolaryngeal complex;increase anterior movement of the hyolaryngeal complex;increase closure at entrance to airway/closure of airway at glottis  -RD     Increase Timing prepping - 3 second prep or suck swallow or 3-step swallow  -CH prepping - 3 second prep or suck swallow or 3-step swallow   -RD     Increase Superior Movement of the Hyolaryngeal Complex effortful pitch glide (falsetto + pharyngeal squeeze)  -CH effortful pitch glide (falsetto + pharyngeal squeeze)  -RD     Increase Anterior Movement of the Hyolaryngeal Complex chin tuck against resistance (CTAR)  -CH chin tuck against resistance (CTAR)  -RD     Increase Closure at Entrance to Airway/Closure of Airway at Glottis supraglottic swallow  -CH supraglottic swallow  -RD     [place]/Accuracy (Pharyngeal Strengthening Goal 1, SLP) with minimal cues (75-90% accuracy)  -CH with minimal cues (75-90% accuracy)  -RD     Time Frame (Pharyngeal Strengthening Goal 1, SLP) short term goal (STG)  -CH short term goal (STG)  -RD     Progress/Outcomes (Pharyngeal Strengthening Goal 1, SLP) continuing progress toward goal  -CH continuing progress toward goal  -RD     Comment (Pharyngeal Strengthening Goal 1, SLP) reviewed & completed  -CH reviewed & completed  -RD            Word Retrieval Skills Goal 1 (SLP)    Improve Word Retrieval Skills By Goal 1 (SLP) answer WH question with one word;supplying an antonym;supplying a synonym;80%;with minimal cues (75-90%)  -CH answer WH question with one word;supplying an antonym;supplying a synonym;80%;with minimal cues (75-90%)  -RD     Time Frame (Word Retrieval Goal 1, SLP) short term goal (STG)  -CH short term goal (STG)  -RD     Progress/Outcomes (Word Retrieval Goal 1, SLP) goal no longer appropriate  -CH goal no longer appropriate  -RD     Comment (Word Retrieval Goal 1, SLP) R MCA CVA, cog not language  -CH R MCA CVA, cog not language  -RD            Awareness of Basic Personal Information Goal 1 (SLP)    Improve Awareness of Basic Personal Information Goal 1 (SLP) answering yes/no questions regarding personal/biographical information;answering open-ended questions regarding personal/biographical information;80%;with minimal cues (75-90%)  - answering yes/no questions regarding personal/biographical  information;answering open-ended questions regarding personal/biographical information;80%;with minimal cues (75-90%)  -RD     Time Frame (Awareness of Basic Personal Information Goal 1, SLP) short term goal (STG)  -CH short term goal (STG)  -RD     Progress (Awareness of Basic Personal Information Goal 1, SLP) 70%;with minimal cues (75-90%)  -CH 60%;with minimal cues (75-90%)  -RD     Progress/Outcomes (Awareness of Basic Personal Information Goal 1, SLP) continuing progress toward goal  -CH continuing progress toward goal  -RD            Attention Goal 1 (SLP)    Improve Attention by Goal 1 (SLP) looking at speaker;attending to task;80%;with minimal cues (75-90%)  -CH looking at speaker;attending to task;80%;with minimal cues (75-90%)  -RD     Time Frame (Attention Goal 1, SLP) short term goal (STG)  -CH short term goal (STG)  -RD     Progress (Attention Goal 1, SLP) 60%;with minimal cues (75-90%)  -CH 60%;with minimal cues (75-90%)  -RD     Progress/Outcomes (Attention Goal 1, SLP) continuing progress toward goal  -CH continuing progress toward goal  -RD     Comment (Attention Goal 1, SLP) mod cues  -CH --            Orientation Goal 1 (SLP)    Improve Orientation Through Goal 1 (SLP) demonstrating orientation to day;demonstrating orientation to month;demonstrating orientation to year;demonstrating orientation to place;demonstrating orientation to disease/impairment;80%;with minimal cues (75-90%)  - demonstrating orientation to day;demonstrating orientation to month;demonstrating orientation to year;demonstrating orientation to place;demonstrating orientation to disease/impairment;80%;with minimal cues (75-90%)  -RD     Time Frame (Orientation Goal 1, SLP) short term goal (STG)  -CH short term goal (STG)  -RD     Progress (Orientation Goal 1, SLP) 80%;with minimal cues (75-90%)  -CH 80%;with minimal cues (75-90%)  -RD     Progress/Outcomes (Orientation Goal 1, SLP) continuing progress toward goal  -CH  continuing progress toward goal  -RD     Comment (Orientation Goal 1, SLP) -- used whiteboard for date independently. needed cues for reason at hospital  -RD            Memory Skills Goal 1 (SLP)    Improve Memory Skills Through Goal 1 (SLP) recalling related word lists immediately;recalling unrelated word lists immediately;80%;with minimal cues (75-90%)  -CH recalling related word lists immediately;recalling unrelated word lists immediately;80%;with minimal cues (75-90%)  -RD     Time Frame (Memory Skills Goal 1, SLP) short term goal (STG)  -CH short term goal (STG)  -RD     Progress (Memory Skills Goal 1, SLP) 50%;with minimal cues (75-90%)  -CH 40%;with minimal cues (75-90%)  -RD     Progress/Outcomes (Memory Skills Goal 1, SLP) continuing progress toward goal  -CH good progress toward goal;continuing progress toward goal  -RD     Comment (Memory Skills Goal 1, SLP) 3 word lists  -CH --            Organizational Skills Goal 1 (SLP)    Improve Thought Organization Through Goal 1 (SLP) completing a divergent naming task;completing a convergent naming task;80%;with minimal cues (75-90%)  -CH completing a divergent naming task;completing a convergent naming task;80%;with minimal cues (75-90%)  -RD     Time Frame (Thought Organization Skills Goal 1, SLP) short term goal (STG)  -CH short term goal (STG)  -RD     Progress (Thought Organization Skills Goal 1, SLP) 50%;with minimal cues (75-90%)  -CH --     Progress/Outcomes (Thought Organization Skills Goal 1, SLP) continuing progress toward goal  -CH goal ongoing  -RD     Comment (Thought Organization Skills Goal 1, SLP) divergent naming  -CH --            Organizational Skills Goal 2 (SLP)    Improve Thought Organization Through Goal 2 (SLP) naming similarities and differences;80%;with minimal cues (75-90%)  -CH naming similarities and differences;80%;with minimal cues (75-90%)  -RD     Time Frame (Thought Organization Skills Goal 2, SLP) short term goal (STG)  -CH  short term goal (STG)  -RD     Progress (Thought Organization Skills Goal 2, SLP) 50%;with minimal cues (75-90%)  -CH 50%;with minimal cues (75-90%)  -RD     Progress/Outcomes (Thought Organization Skills Goal 2, SLP) continuing progress toward goal  -CH continuing progress toward goal  -RD            SLC STG Goal 1 (SLP)    Additional Goal 1, SLP STG: Pt will participate in higher level language dx tx to determine need for additional intervention  -CH STG: Pt will participate in higher level language dx tx to determine need for additional intervention  -RD     Time Frame (Additional Goal 1, SLP) short term goal (STG)  -CH short term goal (STG)  -RD     Progress/Outcomes (Additional Goal 1, SLP) goal ongoing  -CH goal ongoing  -RD           User Key  (r) = Recorded By, (t) = Taken By, (c) = Cosigned By    Initials Name Provider Type    Diane Mccarthy MS CCC-SLP Speech and Language Pathologist    Samantha Aldridge MS CCC-SLP Speech and Language Pathologist                   Time Calculation:    Time Calculation- SLP     Row Name 10/04/22 1312             Time Calculation- SLP    SLP Start Time 0925  -CH      SLP Received On 10/04/22  -              Untimed Charges    26004-UG Treatment/ST Modification Prosth Aug Alter  25  -CH      09212-XY Treatment Swallow Minutes 40  -CH              Total Minutes    Untimed Charges Total Minutes 65  -CH       Total Minutes 65  -CH            User Key  (r) = Recorded By, (t) = Taken By, (c) = Cosigned By    Initials Name Provider Type    Samantha Aldridge MS CCC-SLP Speech and Language Pathologist                Therapy Charges for Today     Code Description Service Date Service Provider Modifiers Qty    97866975908 HC ST TREATMENT SWALLOW 3 10/4/2022 Samantha Colbert MS CCC-SLP GN 1    [id number] HC ST TREATMENT SPEECH 2 10/4/2022 Samantha Colbert MS CCC-GAYE GN 1               Samantha Colbert MS CCC-GAYE  10/4/2022

## 2022-10-04 NOTE — PLAN OF CARE
Goal Outcome Evaluation:           Progress: improving   VSS with pt on RA and NSR on tele. No acute events this shift. Will cont to monitor.

## 2022-10-04 NOTE — PLAN OF CARE
Goal Outcome Evaluation:  SLP treatment completed. Will continue to address dysphagia and cognitive-communication. Please see note for further details and recommendations.

## 2022-10-04 NOTE — PLAN OF CARE
Goal Outcome Evaluation:  Plan of Care Reviewed With: patient        Progress: improving  Outcome Evaluation: Patient is progressing well with PT, continues to be limited by weakness, decreased trunk control, and L sided neglect. She completed STS with BUE support, fluctuating between requiring Mod-Min A x2, demonstrating L lateral and posterior lean. Goals updated to reflect current level of function. Continue per PT POC. Recommend IRF at discharge.

## 2022-10-04 NOTE — PROGRESS NOTES
[facility] Medicine Services  PROGRESS NOTE    Patient Name: Catherine Carroll  : 1950  MRN: [id number]    Date of Admission: 2022  Primary Care Physician: Sujit Batista MD    Subjective   Subjective     CC: Follow-up CVA, Takotsubo cardiomyopathy    HPI:No acute events overnight, patient states that she is doing ok    ROS:  Gen- No fevers, chills  CV- No chest pain, palpitations  Resp- No cough, dyspnea  GI- No N/V/D, abd pain      Objective   Objective     Vital Signs:   Temp:  [97.2 °F (36.2 °C)-98.8 °F (37.1 °C)] 97.2 °F (36.2 °C)  Heart Rate:  [58-81] 64  Resp:  [14-18] 16  BP: (109-121)/(47-60) 109/56     Physical Exam:  Constitutional: Chronically ill-appearing elderly female, in no acute distress, awake, alert  HENT: NCAT, mucous membranes moist  Respiratory: Clear to auscultation bilaterally, respiratory effort normal   Cardiovascular: RRR, no murmurs, rubs, or gallops  Gastrointestinal: Positive bowel sounds, soft, nontender, nondistended  Musculoskeletal: No bilateral ankle edema  Psychiatric: Appropriate affect, cooperative  Neurologic: Oriented x 3, left-sided weakness, facial droop dysarthria  Skin: No rashes      Results Reviewed:  LAB RESULTS:      Lab 10/02/22  0749 10/01/22  0644 22  0945   WBC 7.96 7.40 6.76   HEMOGLOBIN 10.2* 10.6* 10.6*   HEMATOCRIT 32.5* 34.5 31.7*   PLATELETS 251 259 211   NEUTROS ABS  --  4.19  --    IMMATURE GRANS (ABS)  --  0.02  --    LYMPHS ABS  --  1.94  --    MONOS ABS  --  1.13*  --    EOS ABS  --  0.09  --    MCV 93.4 93.5 89.3         Lab 10/02/22  0905 10/01/22  0644 22  1054 22  0945 22  0916   SODIUM 141 139 140 141 139   POTASSIUM 4.8 4.6 4.6 5.2 4.7   CHLORIDE 104 105 104 103 104   CO2 27.0 27.0 29.0 26.0 26.0   ANION GAP 10.0 7.0 7.0 12.0 9.0   BUN 27* 27* 29* 29* 34*   CREATININE 1.74* 1.70* 1.34* 1.36* 1.35*   EGFR 30.9* 31.7* 42.2* 41.5* 41.8*   GLUCOSE 172* 133* 134* 108* 115*   CALCIUM  8.6 8.3* 8.6 8.4* 8.5*                         Brief Urine Lab Results     None          Microbiology Results Abnormal     None          No radiology results from the last 24 hrs    Results for orders placed during the hospital encounter of 09/21/22    Adult Transthoracic Echo Complete W/ Cont if Necessary Per Protocol (With Agitated Saline)    Interpretation Summary  · Estimated left ventricular EF = 30%  · Left ventricular wall thickness is consistent with mild concentric hypertrophy.  · The findings are consistent with stress-induced (Takotsubo) cardiomyopathy.  · The cardiac valves are anatomically and functionally normal.  · Saline test results are negative.      I have reviewed the medications:  Scheduled Meds:aspirin, 325 mg, Oral, Daily   Or  aspirin, 300 mg, Rectal, Daily  atorvastatin, 80 mg, Oral, Nightly  buPROPion XL, 300 mg, Oral, Daily  carvedilol, 3.125 mg, Oral, BID With Meals  clopidogrel, 75 mg, Oral, Q24H  docusate sodium, 100 mg, Oral, BID  famotidine, 20 mg, Oral, Nightly  insulin lispro, 0-7 Units, Subcutaneous, 4x Daily With Meals & Nightly  oxybutynin XL, 5 mg, Oral, Daily  sacubitril-valsartan, 1 tablet, Oral, Q12H  senna-docusate sodium, 2 tablet, Oral, BID  sodium chloride, 10 mL, Intravenous, Q12H  spironolactone, 25 mg, Oral, Daily      Continuous Infusions:   PRN Meds:.•  acetaminophen  •  senna-docusate sodium **AND** polyethylene glycol **AND** bisacodyl **AND** bisacodyl  •  carbamide peroxide  •  dextrose  •  dextrose  •  glucagon (human recombinant)  •  ondansetron  •  potassium & sodium phosphates **OR** potassium & sodium phosphates  •  sodium chloride  •  traMADol    Assessment & Plan   Assessment & Plan     Active Hospital Problems    Diagnosis  POA   • **Acute ischemic right MCA stroke (HCC) [I63.511]  Yes   • Dysphagia, unspecified type [R13.10]  Yes   • Acute ischemic stroke (HCC) [I63.9]  Yes   • S/p left hip fracture [Z87.81]  Not Applicable   • DM (diabetes mellitus),  type 2 (HCC) [E11.9]  Yes      Resolved Hospital Problems   No resolved problems to display.        Brief Hospital Course to date:  Catherine Carroll is a 72 y.o. female with diabetes, recent left hip fracture, CKD, hypertension, sleep apnea, dyslipidemia, generalized anxiety disorder, depression, anemia, multiple falls, and GERD who presented to [facility] on 9/21/2022 after transfer from [facility] for acute CVA management.  Patient apparently was admitted there on September 19 with left hip/pelvic fracture after sustaining a fall.  Orthopedics evaluated the patient and it was felt to be inoperable fracture.  The following morning, patient was found to have development of acute encephalopathy, facial droop, aphasia and left-sided weakness.  Code stroke was called and patient was evaluated.  Patient was given IV tPA..  Further work-up including CTA showed filling defect at the distal right M1 branch extending into the M2 branch.  Subacute infarction noted in the right MCA territory.  CT head perfusion showed right MCA territory CVA with associated penumbra.  Case was discussed with interventional neurology and patient was brought in emergently for thrombectomy.  Patient underwent cerebral angiogram where she was found to have abrupt occlusion of the dominant superior division M2 branch of the right MCA consistent with acute thromboembolic event.  Mechanical thrombectomy was done with the return of normal flow to the right cerebral hemisphere.  Post procedure patient was admitted to ICU for closer monitoring. Patient transferred from ICU to telemetry 9/24. CM is following for SNF placement.     Acute ischemic stroke, right MCA, superior division, likely etiology thromboembolism   -S/P tPA at OSH and mechanical thrombectomy at [facility]   -MRI could not be done due to bladder stimulator in place  -Continue DAPT, statin  -Cardiac monitor at DC-may not be feasible with life  vest  -PT/OT/SLP  -Follow-up stroke clinic 1 month      Takotsubo's Cardiomyopathy   Unstable Angina  Abnormal EKG  HLD  -Cardiology following, LHC showed no significant ischemic heart disease but EF of 30- 34%  -Continue lower dose of Coreg, 3.125mg BID, Aldactone 25 mg daily, Entresto per cardiology  -Life vest prior to discharge  -Follow up with Cardiology in 6 weeks.      Severe oral, Moderate pharyngeal Dysphagia   -SLP following, she is s/p MBS 9/29/2022   - Recs to continue current diet mechanical soft, no mixed consistencies and nectar thickened liquids.     Hypertension  -BP currently controlled, but low  -continue Coreg, Entresto, Aldactone      Well-controlled type 2 diabetes A1c 5.7%   -FSBG's reviewed and appropriate continue basal/bolus and SSI     S/P L hip Fx  Multiple Falls   -Non-operative management per outside Ortho eval   -WBAT, PT/OT     CKD  -Creatinine at baseline, ~1.3, trending up  -Monitor with initiation of Entresto, Aldactone   -encouraged oral hydration  -Follow-up repeat labs     Anxiety/Depression  -Continue Wellbutrin      GERD  -Continue Pepcid      Expected Discharge Location and Transportation: SNF with transition to LTC  Expected Discharge Date: 10/5/2022    DVT prophylaxis:  Mechanical DVT prophylaxis orders are present.     AM-PAC 6 Clicks Score (PT): 9 (10/03/22 0815)    CODE STATUS:   Code Status and Medical Interventions:   Ordered at: 09/28/22 1928     Code Status (Patient has no pulse and is not breathing):    CPR (Attempt to Resuscitate)     Medical Interventions (Patient has pulse or is breathing):    Full Support     Release to patient:    Routine Release       Lisa Villegas MD  10/04/22

## 2022-10-05 VITALS
SYSTOLIC BLOOD PRESSURE: 101 MMHG | HEIGHT: 65 IN | BODY MASS INDEX: 29.16 KG/M2 | RESPIRATION RATE: 18 BRPM | OXYGEN SATURATION: 97 % | DIASTOLIC BLOOD PRESSURE: 49 MMHG | TEMPERATURE: 97.7 F | HEART RATE: 67 BPM | WEIGHT: 175 LBS

## 2022-10-05 PROBLEM — I50.21 ACUTE SYSTOLIC HEART FAILURE (HCC): Status: ACTIVE | Noted: 2022-10-05

## 2022-10-05 LAB
ANION GAP SERPL CALCULATED.3IONS-SCNC: 10 MMOL/L (ref 5–15)
BUN SERPL-MCNC: 25 MG/DL (ref 8–23)
BUN/CREAT SERPL: 13.7 (ref 7–25)
CALCIUM SPEC-SCNC: 8.5 MG/DL (ref 8.6–10.5)
CHLORIDE SERPL-SCNC: 106 MMOL/L (ref 98–107)
CO2 SERPL-SCNC: 25 MMOL/L (ref 22–29)
CREAT SERPL-MCNC: 1.83 MG/DL (ref 0.57–1)
EGFRCR SERPLBLD CKD-EPI 2021: 29 ML/MIN/1.73
GLUCOSE BLDC GLUCOMTR-MCNC: 163 MG/DL (ref 70–130)
GLUCOSE BLDC GLUCOMTR-MCNC: 179 MG/DL (ref 70–130)
GLUCOSE SERPL-MCNC: 182 MG/DL (ref 65–99)
POTASSIUM SERPL-SCNC: 5.1 MMOL/L (ref 3.5–5.2)
SARS-COV-2 RDRP RESP QL NAA+PROBE: NORMAL
SODIUM SERPL-SCNC: 141 MMOL/L (ref 136–145)

## 2022-10-05 PROCEDURE — 63710000001 INSULIN LISPRO (HUMAN) PER 5 UNITS

## 2022-10-05 PROCEDURE — 82962 GLUCOSE BLOOD TEST: CPT

## 2022-10-05 PROCEDURE — 80048 BASIC METABOLIC PNL TOTAL CA: CPT | Performed by: INTERNAL MEDICINE

## 2022-10-05 PROCEDURE — 87635 SARS-COV-2 COVID-19 AMP PRB: CPT | Performed by: INTERNAL MEDICINE

## 2022-10-05 PROCEDURE — 99239 HOSP IP/OBS DSCHRG MGMT >30: CPT | Performed by: NURSE PRACTITIONER

## 2022-10-05 RX ORDER — PSEUDOEPHEDRINE HCL 30 MG
100 TABLET ORAL 2 TIMES DAILY
Start: 2022-10-05

## 2022-10-05 RX ORDER — SPIRONOLACTONE 25 MG/1
25 TABLET ORAL DAILY
Start: 2022-10-05

## 2022-10-05 RX ORDER — CLOPIDOGREL BISULFATE 75 MG/1
75 TABLET ORAL EVERY 24 HOURS
Qty: 30 TABLET
Start: 2022-10-05

## 2022-10-05 RX ORDER — TRAZODONE HYDROCHLORIDE 150 MG/1
150 TABLET ORAL NIGHTLY
Start: 2022-10-05

## 2022-10-05 RX ORDER — FAMOTIDINE 20 MG/1
20 TABLET, FILM COATED ORAL NIGHTLY
Start: 2022-10-05

## 2022-10-05 RX ORDER — ATORVASTATIN CALCIUM 80 MG/1
80 TABLET, FILM COATED ORAL NIGHTLY
Qty: 90 TABLET
Start: 2022-10-05

## 2022-10-05 RX ORDER — INSULIN LISPRO 100 [IU]/ML
0-7 INJECTION, SOLUTION INTRAVENOUS; SUBCUTANEOUS
Refills: 12
Start: 2022-10-05

## 2022-10-05 RX ORDER — CARVEDILOL 3.12 MG/1
3.12 TABLET ORAL 2 TIMES DAILY WITH MEALS
Start: 2022-10-05

## 2022-10-05 RX ADMIN — SPIRONOLACTONE 25 MG: 25 TABLET ORAL at 08:50

## 2022-10-05 RX ADMIN — CARVEDILOL 3.12 MG: 3.12 TABLET, FILM COATED ORAL at 08:50

## 2022-10-05 RX ADMIN — SACUBITRIL AND VALSARTAN 1 TABLET: 24; 26 TABLET, FILM COATED ORAL at 08:50

## 2022-10-05 RX ADMIN — BUPROPION HYDROCHLORIDE 300 MG: 150 TABLET, FILM COATED, EXTENDED RELEASE ORAL at 08:49

## 2022-10-05 RX ADMIN — INSULIN LISPRO 2 UNITS: 100 INJECTION, SOLUTION INTRAVENOUS; SUBCUTANEOUS at 08:50

## 2022-10-05 RX ADMIN — Medication 10 ML: at 08:50

## 2022-10-05 RX ADMIN — SENNOSIDES AND DOCUSATE SODIUM 2 TABLET: 50; 8.6 TABLET ORAL at 08:49

## 2022-10-05 RX ADMIN — INSULIN LISPRO 2 UNITS: 100 INJECTION, SOLUTION INTRAVENOUS; SUBCUTANEOUS at 11:45

## 2022-10-05 RX ADMIN — TRAMADOL HYDROCHLORIDE 50 MG: 50 TABLET, COATED ORAL at 08:51

## 2022-10-05 RX ADMIN — OXYBUTYNIN CHLORIDE 5 MG: 5 TABLET, EXTENDED RELEASE ORAL at 08:50

## 2022-10-05 RX ADMIN — DOCUSATE SODIUM 100 MG: 100 CAPSULE, LIQUID FILLED ORAL at 08:50

## 2022-10-05 RX ADMIN — ASPIRIN 325 MG ORAL TABLET 325 MG: 325 PILL ORAL at 08:49

## 2022-10-05 NOTE — DISCHARGE PLACEMENT REQUEST
"Duke Bernal (72 y.o. Female)     CM, Beth Huddleston RN, [phone]            Date of Birth   1950    Social Security Number       Address   [address] [facility] [address]    Home Phone   [phone]    MRN   [id number]       Orthodox   Muslim    Marital Status   Single                            Admission Date   22    Admission Type   Urgent    Admitting Provider   Lisa Villegas MD    Attending Provider   Lisa Villegas MD    Department, Room/Bed   [facility] 6B, N639/1       Discharge Date       Discharge Disposition   Long Term Care (DC - External)    Discharge Destination                               Attending Provider: Lisa Villegas MD    Allergies: Abilify [Aripiprazole], Aminoglycosides, Nsaids    Isolation: None   Infection: COVID Screen (preop/placement) (10/05/22)   Code Status: CPR   Advance Care Planning Activity    Ht: 165 cm (64.96\")   Wt: 79.4 kg (175 lb)    Admission Cmt: None   Principal Problem: Acute ischemic right MCA stroke (HCC) [I63.511]                 Active Insurance as of 2022     Primary Coverage     Payor Plan Insurance Group Employer/Plan Group    HUMANA MEDICARE REPLACEMENT HUMANA MEDICARE REPLACEMENT [id number]     Payor Plan Address Payor Plan Phone Number Payor Plan Fax Number Effective Dates    [address] [address] [phone]  2021 - None Entered    [facility] [address]       Subscriber Name Subscriber Birth Date Member ID       DUKE BERNAL 1950 [id number]                 Emergency Contacts      (Rel.) Home Phone Work Phone Mobile Phone    DESHAWN RAMIREZ (Sister) [phone] -- --    Babs Howell (Ok to give info to per Sister) (Friend) -- -- [phone]                 Discharge Summary      Sanjuanita Marina APRN at 10/05/22 0843              [facility] Medicine Services  DISCHARGE SUMMARY    Patient Name: Duke Bernal  : 1950  MRN: [id number]    Date of " Admission: 9/21/2022 11:22 AM  Date of Discharge: 10/5/2022  Primary Care Physician: Sujit Batista MD    Consults     Date and Time Order Name Status Description    9/22/2022  1:57 PM Inpatient Cardiology Consult Completed           Hospital Course   Presenting Problem:   Dysphagia, unspecified type [R13.10]  Acute ischemic stroke (HCC) [I63.9]    Active Hospital Problems    Diagnosis  POA   • **Acute ischemic right MCA stroke (HCC) [I63.511]  Yes   • Acute systolic heart failure (CMS/HCC) [I50.21]  Unknown   • Dysphagia, unspecified type [R13.10]  Yes   • Acute ischemic stroke (HCC) [I63.9]  Yes   • S/p left hip fracture [Z87.81]  Not Applicable   • DM (diabetes mellitus), type 2 (HCC) [E11.9]  Yes      Resolved Hospital Problems   No resolved problems to display.      Hospital Course:  Catherine Carroll is a 72 y.o. female with diabetes, recent left hip fracture, CKD, hypertension, sleep apnea, dyslipidemia, generalized anxiety disorder, depression, anemia, multiple falls, and GERD who presented to [facility] on 9/21/2022 after transfer from [facility] for acute CVA management.  Patient apparently was admitted there on September 19 with left hip/pelvic fracture after sustaining a fall.  Orthopedics evaluated the patient and it was felt to be inoperable fracture.  The following morning, patient was found to have development of acute encephalopathy, facial droop, aphasia and left-sided weakness.  Code stroke was called and patient was evaluated.  Patient was given IV tPA..  Further work-up including CTA showed filling defect at the distal right M1 branch extending into the M2 branch.  Subacute infarction noted in the right MCA territory.  CT head perfusion showed right MCA territory CVA with associated penumbra.  Case was discussed with interventional neurology and patient was brought in emergently for thrombectomy.  Patient underwent cerebral angiogram where she was found to have  abrupt occlusion of the dominant superior division M2 branch of the right MCA consistent with acute thromboembolic event.  Mechanical thrombectomy was done with the return of normal flow to the right cerebral hemisphere.  Post procedure patient was admitted to ICU for closer monitoring. Patient transferred from ICU to telemetry 9/24. CM is following for SNF placement.      Acute ischemic stroke, right MCA, superior division, likely etiology thromboembolism   -S/P tPA at OSH and mechanical thrombectomy at [facility]   -MRI could not be done due to bladder stimulator in place  -Continue DAPT, statin  -Cardiac monitor at DC-may not be feasible with life vest  -PT/OT/SLP  -Follow-up stroke clinic 1 month      Takotsubo's Cardiomyopathy   Unstable Angina  Abnormal EKG  HLD  -Cardiology following, [facility] showed no significant ischemic heart disease but EF of 30- 34%  -Continue lower dose of Coreg, 3.125mg BID, Aldactone 25 mg daily, Entresto per cardiology  -Life vest prior to discharge  -Follow up with Cardiology in 6 weeks.      Severe oral, Moderate pharyngeal Dysphagia   -SLP following, she is s/p MBS 9/29/2022   -Recs to continue current diet mechanical soft, no mixed consistencies and nectar thickened liquids.     Hypertension  -BP currently controlled, but low  -continue Coreg, Entresto, Aldactone      Well-controlled type 2 diabetes A1c 5.7%   -24H glucose 137-280  --continue SSI at discharge  -- Restart home dose Dulaglutide     S/P L hip Fx  Multiple Falls   -Non-operative management per outside Ortho eval   -WBAT, PT/OT     CKD  -Cr 1.77; stable  -Creatinine at baseline, 1.34-1.87  -Monitor with initiation of Entresto, Aldactone   -encouraged oral hydration  -Repeat labs in 1-2 days     Anxiety/Depression  -Continue Wellbutrin      GERD  -Continue Pepcid     Patient will be discharged to LTAC at [facility] today and transported by ambulance discharge recommendations and appointments are listed  below.    Discharge Follow Up Recommendations for outpatient labs/diagnostics:  --Follow-up with facility provider in 1 to 2 days   --Follow-up with your family doctor in 1 week   --Follow-up with cardiology in 6 weeks   --Follow-up with stroke clinic in 1 month   --Continue aspirin and Plavix   --Continue high-dose statin   Day of Discharge   HPI:   Patient sitting up in bed eating breakfast in NAD.  Chest to have bed raised a lot of more and eating her eggs and her cereal.  States she is glad to be going to [place] today to get out of the hospital.  Patient is complaining of left hip pain.  No family in the room.    Review of Systems  Gen- No fevers, chills  CV- No chest pain, palpitations  Resp- No cough, dyspnea  GI- No N/V/D, abd pain  Neuro- +left-sided weakness, problems swallowing  Ext- +left hip pain  All other systems have been reviewed and the pertinent positives and negatives are listed above in HPI or ROS    Vital Signs:   Temp:  [97.5 °F (36.4 °C)-97.9 °F (36.6 °C)] 97.7 °F (36.5 °C)  Heart Rate:  [62-85] 74  Resp:  [14-18] 18  BP: ()/(44-81) 101/58  Physical Exam:  Constitutional: Alert, chronically ill-appearing elderly female sitting up in bed eating breakfast in NAD  Eyes: EOMI, sclerae anicteric, no conjunctival injection  Head: NCAT  ENT: [place], moist mucous membranes   Respiratory: Nonlabored, symmetrical chest expansion, CTAB, 100% RA  Cardiovascular: RRR, HR 77, no M/R/G, +DP pulses bilaterally  Gastrointestinal: Soft, NT, ND +BS  Musculoskeletal: MACK; no LE edema bilaterally  Neurologic: Oriented x4, left-sided weakness, follows all commands, facial droop, speech clear, dysphagia  Skin: No rashes on exposed skin  Psychiatric: Pleasant and cooperative; normal affect  Pertinent  and/or Most Recent Results     LAB RESULTS:      Lab 10/02/22  0749 10/01/22  0644 09/28/22  0945   WBC 7.96 7.40 6.76   HEMOGLOBIN 10.2* 10.6* 10.6*   HEMATOCRIT 32.5* 34.5 31.7*   PLATELETS 251 259 211   NEUTROS  ABS  --  4.19  --    IMMATURE GRANS (ABS)  --  0.02  --    LYMPHS ABS  --  1.94  --    MONOS ABS  --  1.13*  --    EOS ABS  --  0.09  --    MCV 93.4 93.5 89.3         Lab 10/04/22  0712 10/02/22  0905 10/01/22  0644 09/30/22  1054 09/28/22  0945   SODIUM 136 141 139 140 141   POTASSIUM 4.7 4.8 4.6 4.6 5.2   CHLORIDE 103 104 105 104 103   CO2 22.0 27.0 27.0 29.0 26.0   ANION GAP 11.0 10.0 7.0 7.0 12.0   BUN 23 27* 27* 29* 29*   CREATININE 1.77* 1.74* 1.70* 1.34* 1.36*   EGFR 30.2* 30.9* 31.7* 42.2* 41.5*   GLUCOSE 137* 172* 133* 134* 108*   CALCIUM 8.5* 8.6 8.3* 8.6 8.4*   PHOSPHORUS 4.2  --   --   --   --          Lab 10/04/22  0712   ALBUMIN 2.70*                     Brief Urine Lab Results     None        Microbiology Results (last 10 days)     ** No results found for the last 240 hours. **          FL Video Swallow With Speech Single Contrast    Result Date: 9/30/2022  EXAMINATION: FL VIDEO SWALLOW W SPEECH SINGLE-CONTRAST-  INDICATION: dysphagia; R13.12-Dysphagia, oropharyngeal phase; R47.1-Dysarthria and anarthria; R41.841-Cognitive communication deficit  TECHNIQUE: 1 minute and 12 seconds of fluoroscopic time was used for this exam. 12 associated fluoroscopic loops were saved. The patient was evaluated in the seated lateral position while taking a variety of consistencies of barium by mouth under the direction of speech pathology.  COMPARISON: NONE  FINDINGS: Limited and 12 seconds of fluoroscopy provided for a modified barium swallow. Please see speech therapy report for full details and recommendations.       Fluoroscopy provided for a modified barium swallow. Please see speech therapy report for full details and recommendations.    This report was finalized on 9/30/2022 9:10 AM by Dr. Caleb Mukherjee MD.      Cardiac Catheterization/Vascular Study    Result Date: 9/27/2022  · LV pressures (S/D/E) : 113/52/76 mmHg   [facility] CARDIOLOGY [address], [address] [place], [place], [id number] [phone]   [email or web address]   CARDIAC CATHETERIZATION PROCEDURE NOTE     · Minimal nonobstructive CAD, mid RCA 10 to 20%, normal LAD and circumflex. · Takotsubo cardiomyopathy · EF 30 to 34% RECOMMENDATIONS: · Continue medical management for Takotsubo cardiomyopathy. --------------------------------------------------------------------------- ------------------------------------------- Indication(s) for this Procedure:  Cardiomyopathy with EF of 30% with regional wall motion abnormality concern for Takotsubo versus wraparound LAD stenosis. Procedure(s) Performed: 1.  Left Common femoral artery access, as the patient had a recent cerebral angio via right femoral access in her right radial sellae calcified and a wire could not advance. 2. Selective coronary angiography 3. Left heart catheterization.  Description of the Procedure:   Informed consent was obtained with the goals, rationale, alternatives, risks and benefits of the procedure explained to the patient.  A 6Fr sheath was placed in the left common femoral artery. Selective angiography of the right coronary artery was performed with a 5Fr JR4 diagnostic catheter.  Selective angiography of the left coronary arteries was performed with a 5Fr JL 4 diagnostic catheter.  Left heart catheterization with left ventriculography was performed with a 5Fr pigtail catheter placed in the left ventricle.  The procedure was completed and the sheath was removed, and 6 Hebrew Angio-Seal was used for hemostasis.  The patient and their family (sister by phone) were updated on the findings and plan at the conclusion of the case. Angiographic Findings: Right coronary dominant circulation · Left main artery:   Large-caliber vessel bifurcates into LAD and left circumflex, normal. · Left anterior descending artery: Large-caliber vessel gives rise to several diagonal branches, normal. · Left circumflex artery: Large-caliber nondominant vessel gives rise to a bifurcating OM branch, normal. ·  Right coronary artery: Large-caliber dominant vessel gives rise to PDA and PLV, mid 10-20%, otherwise normal. Left Ventricle: LVEF 30-34% with anterior, apex and distal inferior hypokinesis, Takotsubo pattern Hemodynamic Findings: · Ao pressure: 133/60 mmHg · LVEDP: 16 mmHg · LV to Ao pullback peak to peak gradient: 11 mmHg Estimated Blood Loss: Minimal Specimen(s): None obtained Sheath: Removed, 6 Sinhala Angio-Seal used for hemostasis. Complications: There were no apparent early complications. Mitch Underwood MD, [facility] Interventional Cardiology     XR Abdomen KUB    Result Date: 9/27/2022  Examination: XR ABDOMEN KUB Indication: Feeding tube placement Comparison: 7:20 PM Findings: The feeding tube has been advanced, the tip now projecting over the right upper quadrant in the expected location of the gastric antrum or duodenal bulb. There is gas-filled bowel loops throughout the abdomen and pelvis, nonspecific. Contrast is present in the urinary bladder.     Impression: The tip of the feeding tube now projects over the right upper quadrant in the expected location of the gastric antrum or duodenal bulb. Electronically signed by:  Branden Christine M.D.  9/27/2022 9:49 PM Mountain Time    XR Abdomen KUB    Result Date: 9/27/2022  XR ABDOMEN KUB-  Date of Exam: 9/27/2022 7:13 PM  Indication: partially pulled out jens feed; R13.12-Dysphagia, oropharyngeal phase; R47.1-Dysarthria and anarthria; R41.841-Cognitive communication deficit.  Comparison Exams: None available.  Technique: Single AP radiograph of the abdomen  FINDINGS: A duotube has its tip in the distal esophagus.      Duotube with tip in distal esophagus. Recommend advance at least 10 cm.  This report was finalized on 9/27/2022 7:46 PM by Kilo Henley MD.        Results for orders placed during the hospital encounter of 09/21/22    Adult Transthoracic Echo Complete W/ Cont if Necessary Per Protocol (With Agitated Saline)    Interpretation Summary  · Estimated  left ventricular EF = 30%  · Left ventricular wall thickness is consistent with mild concentric hypertrophy.  · The findings are consistent with stress-induced (Takotsubo) cardiomyopathy.  · The cardiac valves are anatomically and functionally normal.  · Saline test results are negative.      Plan for Follow-up of Pending Labs/Results:   Discharge Details        Discharge Medications      New Medications      Instructions Start Date   carbamide peroxide 6.5 % otic solution  Commonly known as: DEBROX   5 drops, Right Ear, 2 Times Daily PRN      carvedilol 3.125 MG tablet  Commonly known as: COREG   3.125 mg, Oral, 2 Times Daily With Meals      clopidogrel 75 MG tablet  Commonly known as: PLAVIX   75 mg, Oral, Every 24 Hours      docusate sodium 100 MG capsule   100 mg, Oral, 2 Times Daily      famotidine 20 MG tablet  Commonly known as: PEPCID   20 mg, Oral, Nightly      Insulin Lispro 100 UNIT/ML injection  Commonly known as: humaLOG   0-7 Units, Subcutaneous, 4 Times Daily With Meals & Nightly      sacubitril-valsartan 24-26 MG tablet  Commonly known as: ENTRESTO   1 tablet, Oral, Every 12 Hours Scheduled      spironolactone 25 MG tablet  Commonly known as: ALDACTONE   25 mg, Oral, Daily         Changes to Medications      Instructions Start Date   atorvastatin 80 MG tablet  Commonly known as: LIPITOR  What changed:   · medication strength  · how much to take  · when to take this   80 mg, Oral, Nightly      traZODone 150 MG tablet  Commonly known as: DESYREL  What changed: how much to take   150 mg, Oral, Nightly         Continue These Medications      Instructions Start Date   buPROPion  MG 24 hr tablet  Commonly known as: WELLBUTRIN XL   300 mg, Oral, Every Morning      Dulaglutide 1.5 MG/0.5ML solution pen-injector   1.5 mg, Subcutaneous, Weekly, On Wednesdays      estradiol 0.1 MG/GM vaginal cream  Commonly known as: ESTRACE   1 g, Vaginal, 3 Times Weekly      FLUoxetine 20 MG capsule  Commonly known as:  PROzac   40 mg, Oral, Every Morning      levocetirizine 5 MG tablet  Commonly known as: XYZAL   5 mg, Oral, Every Evening      multivitamin with minerals tablet tablet   1 tablet, Oral, Daily      oxybutynin XL 5 MG 24 hr tablet  Commonly known as: DITROPAN-XL   5 mg, Oral, Daily      pantoprazole 20 MG EC tablet  Commonly known as: PROTONIX   20 mg, Oral, Daily      tiZANidine 4 MG tablet  Commonly known as: ZANAFLEX   4 mg, Oral, 2 Times Daily      traMADol 50 MG tablet  Commonly known as: ULTRAM   50 mg, Oral, Every 8 Hours PRN             Allergies   Allergen Reactions   • Abilify [Aripiprazole] Other (See Comments)     Doesn't remember reaction - kidney doctor stopped medication   • Aminoglycosides    • Nsaids Other (See Comments)     Can't take due to kidney function         Discharge Disposition:  Long Term Care (DC - External)    Diet:  Hospital:  Diet Order   Procedures   • Diet Dysphagia; IV - Mechanical Soft No Mixed Consistencies; Nectar / Syrup Thick; Consistent Carbohydrate       Activity:  Activity Instructions     Activity as Tolerated            Restrictions or Other Recommendations:  None       CODE STATUS:    Code Status and Medical Interventions:   Ordered at: 09/28/22 1928     Code Status (Patient has no pulse and is not breathing):    CPR (Attempt to Resuscitate)     Medical Interventions (Patient has pulse or is breathing):    Full Support     Release to patient:    Routine Release       Future Appointments   Date Time Provider Department Center   10/5/2022  1:45 PM EMS 2 [facility] EMS S DAMIEN Bunn  10/05/22      Time Spent on Discharge:  I spent 45 minutes on this discharge activity which included: face-to-face encounter with the patient, reviewing the data in the system, coordination of the care with the nursing staff as well as consultants, documentation, and entering orders.            Electronically signed by Sanjuanita Marina APRN at 10/05/22 0952

## 2022-10-05 NOTE — DISCHARGE SUMMARY
[facility] Medicine Services  DISCHARGE SUMMARY    Patient Name: Catherine Carroll  : 1950  MRN: [id number]    Date of Admission: 2022 11:22 AM  Date of Discharge: 10/5/2022  Primary Care Physician: Sujit Batista MD    Consults     Date and Time Order Name Status Description    2022  1:57 PM Inpatient Cardiology Consult Completed           Hospital Course   Presenting Problem:   Dysphagia, unspecified type [R13.10]  Acute ischemic stroke (HCC) [I63.9]    Active Hospital Problems    Diagnosis  POA   • **Acute ischemic right MCA stroke (HCC) [I63.511]  Yes   • Acute systolic heart failure (CMS/HCC) [I50.21]  Unknown   • Dysphagia, unspecified type [R13.10]  Yes   • Acute ischemic stroke (HCC) [I63.9]  Yes   • S/p left hip fracture [Z87.81]  Not Applicable   • DM (diabetes mellitus), type 2 (HCC) [E11.9]  Yes      Resolved Hospital Problems   No resolved problems to display.      Hospital Course:  Catherine Carroll is a 72 y.o. female with diabetes, recent left hip fracture, CKD, hypertension, sleep apnea, dyslipidemia, generalized anxiety disorder, depression, anemia, multiple falls, and GERD who presented to [facility] on 2022 after transfer from [facility] for acute CVA management.  Patient apparently was admitted there on  with left hip/pelvic fracture after sustaining a fall.  Orthopedics evaluated the patient and it was felt to be inoperable fracture.  The following morning, patient was found to have development of acute encephalopathy, facial droop, aphasia and left-sided weakness.  Code stroke was called and patient was evaluated.  Patient was given IV tPA..  Further work-up including CTA showed filling defect at the distal right M1 branch extending into the M2 branch.  Subacute infarction noted in the right MCA territory.  CT head perfusion showed right MCA territory CVA with associated penumbra.  Case was discussed  with interventional neurology and patient was brought in emergently for thrombectomy.  Patient underwent cerebral angiogram where she was found to have abrupt occlusion of the dominant superior division M2 branch of the right MCA consistent with acute thromboembolic event.  Mechanical thrombectomy was done with the return of normal flow to the right cerebral hemisphere.  Post procedure patient was admitted to ICU for closer monitoring. Patient transferred from ICU to telemetry 9/24. CM is following for SNF placement.      Acute ischemic stroke, right MCA, superior division, likely etiology thromboembolism   -S/P tPA at OSH and mechanical thrombectomy at [facility]   -MRI could not be done due to bladder stimulator in place  -Continue DAPT, statin  -Cardiac monitor at DC-may not be feasible with life vest  -PT/OT/SLP  -Follow-up stroke clinic 1 month      Takotsubo's Cardiomyopathy   Unstable Angina  Abnormal EKG  HLD  -Cardiology following, [facility] showed no significant ischemic heart disease but EF of 30- 34%  -Continue lower dose of Coreg, 3.125mg BID, Aldactone 25 mg daily, Entresto per cardiology  -Life vest prior to discharge  -Follow up with Cardiology in 6 weeks.      Severe oral, Moderate pharyngeal Dysphagia   -SLP following, she is s/p MBS 9/29/2022   -Recs to continue current diet mechanical soft, no mixed consistencies and nectar thickened liquids.     Hypertension  -BP currently controlled, but low  -continue Coreg, Entresto, Aldactone      Well-controlled type 2 diabetes A1c 5.7%   -24H glucose 137-280  --continue SSI at discharge  -- Restart home dose Dulaglutide     S/P L hip Fx  Multiple Falls   -Non-operative management per outside Ortho eval   -WBAT, PT/OT     CKD  -Cr 1.77; stable  -Creatinine at baseline, 1.34-1.87  -Monitor with initiation of Entresto, Aldactone   -encouraged oral hydration  -Repeat labs in 1-2 days     Anxiety/Depression  -Continue Wellbutrin      GERD  -Continue Pepcid     Patient will  be discharged to LTAC at [facility] today and transported by ambulance discharge recommendations and appointments are listed below.    Discharge Follow Up Recommendations for outpatient labs/diagnostics:  --Follow-up with facility provider in 1 to 2 days   --Follow-up with your family doctor in 1 week   --Follow-up with cardiology in 6 weeks   --Follow-up with stroke clinic in 1 month   --Continue aspirin and Plavix   --Continue high-dose statin   Day of Discharge   HPI:   Patient sitting up in bed eating breakfast in [facility].  Chest to have bed raised a lot of more and eating her eggs and her cereal.  States she is glad to be going to [place] today to get out of the hospital.  Patient is complaining of left hip pain.  No family in the room.    Review of Systems  Gen- No fevers, chills  CV- No chest pain, palpitations  Resp- No cough, dyspnea  GI- No N/V/D, abd pain  Neuro- +left-sided weakness, problems swallowing  Ext- +left hip pain  All other systems have been reviewed and the pertinent positives and negatives are listed above in HPI or ROS    Vital Signs:   Temp:  [97.5 °F (36.4 °C)-97.9 °F (36.6 °C)] 97.7 °F (36.5 °C)  Heart Rate:  [62-85] 74  Resp:  [14-18] 18  BP: ()/(44-81) 101/58  Physical Exam:  Constitutional: Alert, chronically ill-appearing elderly female sitting up in bed eating breakfast in NAD  Eyes: EOMI, sclerae anicteric, no conjunctival injection  Head: NCAT  ENT: [place], moist mucous membranes   Respiratory: Nonlabored, symmetrical chest expansion, CTAB, 100% RA  Cardiovascular: RRR, HR 77, no M/R/G, +DP pulses bilaterally  Gastrointestinal: Soft, NT, ND +BS  Musculoskeletal: MACK; no LE edema bilaterally  Neurologic: Oriented x4, left-sided weakness, follows all commands, facial droop, speech clear, dysphagia  Skin: No rashes on exposed skin  Psychiatric: Pleasant and cooperative; normal affect  Pertinent  and/or Most Recent Results     LAB RESULTS:      Lab 10/02/22  0749  10/01/22  0644 09/28/22  0945   WBC 7.96 7.40 6.76   HEMOGLOBIN 10.2* 10.6* 10.6*   HEMATOCRIT 32.5* 34.5 31.7*   PLATELETS 251 259 211   NEUTROS ABS  --  4.19  --    IMMATURE GRANS (ABS)  --  0.02  --    LYMPHS ABS  --  1.94  --    MONOS ABS  --  1.13*  --    EOS ABS  --  0.09  --    MCV 93.4 93.5 89.3         Lab 10/04/22  0712 10/02/22  0905 10/01/22  0644 09/30/22  1054 09/28/22  0945   SODIUM 136 141 139 140 141   POTASSIUM 4.7 4.8 4.6 4.6 5.2   CHLORIDE 103 104 105 104 103   CO2 22.0 27.0 27.0 29.0 26.0   ANION GAP 11.0 10.0 7.0 7.0 12.0   BUN 23 27* 27* 29* 29*   CREATININE 1.77* 1.74* 1.70* 1.34* 1.36*   EGFR 30.2* 30.9* 31.7* 42.2* 41.5*   GLUCOSE 137* 172* 133* 134* 108*   CALCIUM 8.5* 8.6 8.3* 8.6 8.4*   PHOSPHORUS 4.2  --   --   --   --          Lab 10/04/22  0712   ALBUMIN 2.70*                     Brief Urine Lab Results     None        Microbiology Results (last 10 days)     ** No results found for the last 240 hours. **          FL Video Swallow With Speech Single Contrast    Result Date: 9/30/2022  EXAMINATION: FL VIDEO SWALLOW W SPEECH SINGLE-CONTRAST-  INDICATION: dysphagia; R13.12-Dysphagia, oropharyngeal phase; R47.1-Dysarthria and anarthria; R41.841-Cognitive communication deficit  TECHNIQUE: 1 minute and 12 seconds of fluoroscopic time was used for this exam. 12 associated fluoroscopic loops were saved. The patient was evaluated in the seated lateral position while taking a variety of consistencies of barium by mouth under the direction of speech pathology.  COMPARISON: NONE  FINDINGS: Limited and 12 seconds of fluoroscopy provided for a modified barium swallow. Please see speech therapy report for full details and recommendations.       Fluoroscopy provided for a modified barium swallow. Please see speech therapy report for full details and recommendations.    This report was finalized on 9/30/2022 9:10 AM by Dr. Caleb Mukherjee MD.      Cardiac Catheterization/Vascular Study    Result Date:  9/27/2022  · LV pressures (S/D/E) : 113/52/76 mmHg   [facility] CARDIOLOGY [address], [address] [place], [place], [address] [phone]  [email or web address]   CARDIAC CATHETERIZATION PROCEDURE NOTE     · Minimal nonobstructive CAD, mid RCA 10 to 20%, normal LAD and circumflex. · Takotsubo cardiomyopathy · EF 30 to 34% RECOMMENDATIONS: · Continue medical management for Takotsubo cardiomyopathy. --------------------------------------------------------------------------- ------------------------------------------- Indication(s) for this Procedure:  Cardiomyopathy with EF of 30% with regional wall motion abnormality concern for Takotsubo versus wraparound LAD stenosis. Procedure(s) Performed: 1.  Left Common femoral artery access, as the patient had a recent cerebral angio via right femoral access in her right radial sellae calcified and a wire could not advance. 2. Selective coronary angiography 3. Left heart catheterization.  Description of the Procedure:   Informed consent was obtained with the goals, rationale, alternatives, risks and benefits of the procedure explained to the patient.  A 6Fr sheath was placed in the left common femoral artery. Selective angiography of the right coronary artery was performed with a 5Fr JR4 diagnostic catheter.  Selective angiography of the left coronary arteries was performed with a 5Fr JL 4 diagnostic catheter.  Left heart catheterization with left ventriculography was performed with a 5Fr pigtail catheter placed in the left ventricle.  The procedure was completed and the sheath was removed, and 6 Fijian Angio-Seal was used for hemostasis.  The patient and their family (sister by phone) were updated on the findings and plan at the conclusion of the case. Angiographic Findings: Right coronary dominant circulation · Left main artery:   Large-caliber vessel bifurcates into LAD and left circumflex, normal. · Left anterior descending artery: Large-caliber vessel gives  rise to several diagonal branches, normal. · Left circumflex artery: Large-caliber nondominant vessel gives rise to a bifurcating OM branch, normal. · Right coronary artery: Large-caliber dominant vessel gives rise to PDA and PLV, mid 10-20%, otherwise normal. Left Ventricle: LVEF 30-34% with anterior, apex and distal inferior hypokinesis, Takotsubo pattern Hemodynamic Findings: · Ao pressure: 133/60 mmHg · LVEDP: 16 mmHg · LV to Ao pullback peak to peak gradient: 11 mmHg Estimated Blood Loss: Minimal Specimen(s): None obtained Sheath: Removed, 6 Pashto Angio-Seal used for hemostasis. Complications: There were no apparent early complications. Mitch Underwood MD, [facility] Interventional Cardiology     XR Abdomen KUB    Result Date: 9/27/2022  Examination: XR ABDOMEN KUB Indication: Feeding tube placement Comparison: 7:20 PM Findings: The feeding tube has been advanced, the tip now projecting over the right upper quadrant in the expected location of the gastric antrum or duodenal bulb. There is gas-filled bowel loops throughout the abdomen and pelvis, nonspecific. Contrast is present in the urinary bladder.     Impression: The tip of the feeding tube now projects over the right upper quadrant in the expected location of the gastric antrum or duodenal bulb. Electronically signed by:  Branden Christine M.D.  9/27/2022 9:49 PM Mountain Time    XR Abdomen KUB    Result Date: 9/27/2022  XR ABDOMEN KUB-  Date of Exam: 9/27/2022 7:13 PM  Indication: partially pulled out jens feed; R13.12-Dysphagia, oropharyngeal phase; R47.1-Dysarthria and anarthria; R41.841-Cognitive communication deficit.  Comparison Exams: None available.  Technique: Single AP radiograph of the abdomen  FINDINGS: A duotube has its tip in the distal esophagus.      Duotube with tip in distal esophagus. Recommend advance at least 10 cm.  This report was finalized on 9/27/2022 7:46 PM by Kilo Henley MD.        Results for orders placed during the hospital  encounter of 09/21/22    Adult Transthoracic Echo Complete W/ Cont if Necessary Per Protocol (With Agitated Saline)    Interpretation Summary  · Estimated left ventricular EF = 30%  · Left ventricular wall thickness is consistent with mild concentric hypertrophy.  · The findings are consistent with stress-induced (Takotsubo) cardiomyopathy.  · The cardiac valves are anatomically and functionally normal.  · Saline test results are negative.      Plan for Follow-up of Pending Labs/Results:   Discharge Details        Discharge Medications      New Medications      Instructions Start Date   carbamide peroxide 6.5 % otic solution  Commonly known as: DEBROX   5 drops, Right Ear, 2 Times Daily PRN      carvedilol 3.125 MG tablet  Commonly known as: COREG   3.125 mg, Oral, 2 Times Daily With Meals      clopidogrel 75 MG tablet  Commonly known as: PLAVIX   75 mg, Oral, Every 24 Hours      docusate sodium 100 MG capsule   100 mg, Oral, 2 Times Daily      famotidine 20 MG tablet  Commonly known as: PEPCID   20 mg, Oral, Nightly      Insulin Lispro 100 UNIT/ML injection  Commonly known as: humaLOG   0-7 Units, Subcutaneous, 4 Times Daily With Meals & Nightly      sacubitril-valsartan 24-26 MG tablet  Commonly known as: ENTRESTO   1 tablet, Oral, Every 12 Hours Scheduled      spironolactone 25 MG tablet  Commonly known as: ALDACTONE   25 mg, Oral, Daily         Changes to Medications      Instructions Start Date   atorvastatin 80 MG tablet  Commonly known as: LIPITOR  What changed:   medication strength  how much to take  when to take this   80 mg, Oral, Nightly      traZODone 150 MG tablet  Commonly known as: DESYREL  What changed: how much to take   150 mg, Oral, Nightly         Continue These Medications      Instructions Start Date   buPROPion  MG 24 hr tablet  Commonly known as: WELLBUTRIN XL   300 mg, Oral, Every Morning      Dulaglutide 1.5 MG/0.5ML solution pen-injector   1.5 mg, Subcutaneous, Weekly, On  Wednesdays      estradiol 0.1 MG/GM vaginal cream  Commonly known as: ESTRACE   1 g, Vaginal, 3 Times Weekly      FLUoxetine 20 MG capsule  Commonly known as: PROzac   40 mg, Oral, Every Morning      levocetirizine 5 MG tablet  Commonly known as: XYZAL   5 mg, Oral, Every Evening      multivitamin with minerals tablet tablet   1 tablet, Oral, Daily      oxybutynin XL 5 MG 24 hr tablet  Commonly known as: DITROPAN-XL   5 mg, Oral, Daily      pantoprazole 20 MG EC tablet  Commonly known as: PROTONIX   20 mg, Oral, Daily      tiZANidine 4 MG tablet  Commonly known as: ZANAFLEX   4 mg, Oral, 2 Times Daily      traMADol 50 MG tablet  Commonly known as: ULTRAM   50 mg, Oral, Every 8 Hours PRN             Allergies   Allergen Reactions   • Abilify [Aripiprazole] Other (See Comments)     Doesn't remember reaction - kidney doctor stopped medication   • Aminoglycosides    • Nsaids Other (See Comments)     Can't take due to kidney function         Discharge Disposition:  Long Term Care (DC - External)    Diet:  Hospital:  Diet Order   Procedures   • Diet Dysphagia; IV - Mechanical Soft No Mixed Consistencies; Nectar / Syrup Thick; Consistent Carbohydrate       Activity:  Activity Instructions     Activity as Tolerated            Restrictions or Other Recommendations:  None       CODE STATUS:    Code Status and Medical Interventions:   Ordered at: 09/28/22 1928     Code Status (Patient has no pulse and is not breathing):    CPR (Attempt to Resuscitate)     Medical Interventions (Patient has pulse or is breathing):    Full Support     Release to patient:    Routine Release       Future Appointments   Date Time Provider Department [place]   10/5/2022  1:45 PM EMS 2  [facility]  10/05/22      Time Spent on Discharge:  I spent 45 minutes on this discharge activity which included: face-to-face encounter with the patient, reviewing the data in the system, coordination of the care with the nursing staff as  well as consultants, documentation, and entering orders.

## 2022-10-05 NOTE — PLAN OF CARE
Goal Outcome Evaluation:           Progress: improving   Pt being discharged today. IV removed and tele box placed in omnicell.

## 2022-10-05 NOTE — PLAN OF CARE
Goal Outcome Evaluation:  Plan of Care Reviewed With: patient        Progress: improving  Outcome Evaluation: Slept tthroughout the night.No acute distress noted. SR on tele. Denies CP or SOA. VSS. Will cont with POC

## 2022-10-05 NOTE — CASE MANAGEMENT/SOCIAL WORK
Case Management Discharge Note      Final Note: Patient is going to [facility] today, 10/5/22 at 1345 by  ambulance. Nurse to call report to [phone]. CM will fax the d.c summary to [phone]. COVID test ordered. PCS is on the chart. I updated family and patient updated and in agreement with plan.    Pharmacy change to [facility] in [place].       Selected Continued Care - Admitted Since 9/21/2022     Destination Coordination complete.    Service Provider Selected Services Address Phone Fax Patient Preferred    [facility]  Skilled [facility] [address] [facility] [address] [phone] --          Durable Medical Equipment    No services have been selected for the patient.              Dialysis/Infusion    No services have been selected for the patient.              Home Medical Care    No services have been selected for the patient.              Therapy    No services have been selected for the patient.              Community Resources    No services have been selected for the patient.              Community & DME    No services have been selected for the patient.                       Final Discharge Disposition Code: 03 - skilled nursing facility (SNF)

## 2022-11-15 PROBLEM — I51.81 TAKOTSUBO CARDIOMYOPATHY: Status: ACTIVE | Noted: 2022-11-15

## 2022-11-15 NOTE — PROGRESS NOTES
OFFICE VISIT  NOTE  [facility] CARDIOLOGY MAIN CAMPUS      Name: Catherine Carroll    Date: 2022  MRN:  [id number]  :  1950      REFERRING/PRIMARY PROVIDER:  Kavon Allison MD     Chief Complaint   Patient presents with   • takotsubo cardiomyopathy     HPI: Catherine Carroll is a 72 y.o. female who presents today for hospital follow up from -10/5/22 for CVA and Takotsubo CM. She has history of diabetes, recent left hip fracture, CKD, hypertension, sleep apnea, dyslipidemia, generalized anxiety disorder, depression, anemia, multiple falls, and GERD who was transferred to [facility] from [facility] for acute CVA management. She underwent mechanical thrombectomy of right MCA, and etiology was felt to be cardioembolic. Echo 22 demonstrated EF 30%, normal valves. Cardiac cath 22 showed minimal nonobstructive CAD. She was started on Plavix, Entresto, Coreg, Spironolactone and discharged to rehab with a Lifevest in place. Since her discharge, she denies any cardiac symptoms or concerns. She has not had any angina, dyspnea, or heart failure symptoms. She does not ambulate due to left sided hemiplegia, and is in a wheelchair. Her main complaint today is discomfort related to wearing the Lifevest.     ROS:Pertinent positives as listed in the HPI.  All other systems reviewed and negative.    Past Medical History:   Diagnosis Date   • Acid reflux    • Alopecia     severe   • Anxiety    • CHF (congestive heart failure) (HCC)    • Chronic kidney disease     NKF classification) See under renal insuff - renal MD has limited her to 50 gms prot/day - this will signif affect her ability to be successful with WLS and may incr her risk of leak (gerardo as her prealb already low) - she still adamantly wishes to proceed.  Also has proteinuria   • Depression    • Diabetes mellitus (HCC)    • Dyspepsia    • Dyspnea on exertion    • Fatigue    • Hyperlipidemia    • Hypertension     • Hypoalbuminemia     3.1   • Insomnia    • Joint pain    • Morbid obesity (HCC)     (SUPER)   • Nephrolithiasis    • Obstructive sleep apnea     prior to her orig Band in 2007 she had RAVEN on CPAP (309 lbs).     • Proteinuria    • PTSD (post-traumatic stress disorder)    • Renal insufficiency     related to nephrolithiasis.  GFR 45, (L) 40%, (R) 60%,  follows w/ Dr. Bunch   • Stroke (HCC)    • Type 2 diabetes mellitus (HCC)     dx 1997, on insulin 10+ yrs, A1c 9.5 5/14/15.     • Urolithiasis        Past Surgical History:   Procedure Laterality Date   • BREAST LUMPECTOMY Right 2010    benign cyst   • CARDIAC CATHETERIZATION N/A 9/27/2022    Procedure: LEFT HEART CATH;  Surgeon: Mitch Underwood MD;  Location:  [facility]VASIVE [facility];  Service: Cardiovascular;  Laterality: N/A;   • CHOLECYSTECTOMY     • CYSTOSCOPY     • CYSTOSCOPY W/ LITHOLAPAXY / EHL     • GASTRIC BANDING REMOVAL  11/2010    by [facility] for intolerance    • GASTRIC PORT CHANGE      lapband port reposition and shortening of lapband tubing after a port flip in 11/2007 by [facility]    • GASTRIC SLEEVE LAPAROSCOPIC      LAGB REG/ HHR 2/2007 by [facility]   • HIP TROCHANTERIC NAILING WITH INTRAMEDULLARY HIP SCREW Right 9/28/2016    Procedure: HIP TROCANTERIC NAILING WITH INTRAMEDULLARY HIP SCREW;  Surgeon: Deshawn Solis MD;  Location:  [facility];  Service:    • HIP TROCHANTERIC NAILING WITH INTRAMEDULLARY HIP SCREW Left 1/23/2019    Procedure: HIP TROCANTERIC NAILING LEFT;  Surgeon: Luther Thurston MD;  Location:  [facility];  Service: Orthopedics   • INTERVENTIONAL RADIOLOGY PROCEDURE N/A 9/21/2022    Procedure: IR MECHANICAL THROMBECTOMY - PRIMARY;  Surgeon: Randy Hair MD;  Location:  [facility] CATH INVASIVE LOCATION;  Service: Interventional Radiology;  Laterality: N/A;   • KIDNEY SURGERY Left 2003    kidney surgery for embedded stent removal; multiple kidney stone removals and stent placements     • LAPAROSCOPIC CHOLECYSTECTOMY  2003    for  gallstones   • LAPAROSCOPIC GASTRIC BANDING      Adjustable Gastric Band s/p LAGB APL/ HHR 8/2011 by [facility]    • SHOULDER SURGERY Left 12/27/2013   • TONSILLECTOMY  1957   • ULNA/RADIUS CLOSED REDUCTION Right 9/28/2016    Procedure: ULNA/RADIUS CLOSED REDUCTION;  Surgeon: Deshawn Solis MD;  Location: [facility];  Service:        Social History     Socioeconomic History   • Marital status: Single   Tobacco Use   • Smoking status: Never   • Smokeless tobacco: Never   Substance and Sexual Activity   • Alcohol use: No   • Drug use: No   • Sexual activity: Defer       History reviewed. No pertinent family history.     Allergies   Allergen Reactions   • Abilify [Aripiprazole] Other (See Comments)     Doesn't remember reaction - kidney doctor stopped medication   • Aminoglycosides    • Nsaids Other (See Comments)     Can't take due to kidney function       Current Outpatient Medications   Medication Instructions   • aspirin 81 mg, Oral, Daily   • atorvastatin (LIPITOR) 80 mg, Oral, Nightly   • buPROPion XL (WELLBUTRIN XL) 300 mg, Oral, Every Morning   • carbamide peroxide (DEBROX) 6.5 % otic solution 5 drops, Right Ear, 2 Times Daily PRN   • carvedilol (COREG) 3.125 mg, Oral, 2 Times Daily With Meals   • clopidogrel (PLAVIX) 75 mg, Oral, Every 24 Hours   • docusate sodium 100 mg, Oral, 2 Times Daily   • Dulaglutide 1.5 mg, Subcutaneous, Weekly, On Wednesdays   • estradiol (ESTRACE) 1 g, Vaginal, 3 Times Weekly   • famotidine (PEPCID) 20 mg, Oral, Nightly   • FLUoxetine (PROZAC) 40 mg, Oral, Every Morning   • Insulin Lispro (HUMALOG) 0-7 Units, Subcutaneous, 4 Times Daily With Meals & Nightly   • Levemir 5 Units, Daily   • levocetirizine (XYZAL) 5 mg, Oral, Every Evening   • Multiple Vitamins-Minerals (CENTRUM SILVER PO) 1 tablet, Oral, Daily   • oxybutynin XL (DITROPAN-XL) 5 mg, Oral, Daily   • pantoprazole (PROTONIX) 20 mg, Oral, Daily   • sacubitril-valsartan (ENTRESTO) 24-26 MG tablet 1 tablet, Oral, Every 12 Hours  "Scheduled   • spironolactone (ALDACTONE) 25 mg, Oral, Daily   • tiZANidine (ZANAFLEX) 4 mg, Oral, 2 Times Daily   • traMADol (ULTRAM) 50 mg, Oral, Every 8 Hours PRN   • traZODone (DESYREL) 150 mg, Oral, Nightly       Vitals:    11/17/22 1135   BP: 98/50   BP Location: Right arm   Patient Position: Sitting   Cuff Size: Adult   Pulse: 73   SpO2: 97%   Weight: 78.9 kg (174 lb)   Height: 165.1 cm (65\")     Body mass index is 28.96 kg/m².    PHYSICAL EXAM:    General Appearance:   · well developed  · well nourished  Neck:  · thyroid not enlarged  · supple  Respiratory:  · no respiratory distress  · normal breath sounds  · no rales  Cardiovascular:  · no jugular venous distention  · regular rhythm  · apical impulse normal  · S1 normal, S2 normal  · no S3, no S4   · no murmur  · no rub, no thrill  · lower extremity edema: none    Skin:   warm, dry    RESULTS:   Procedures    Results for orders placed during the hospital encounter of 09/21/22    Adult Transthoracic Echo Complete W/ Cont if Necessary Per Protocol (With Agitated Saline)    Interpretation Summary  · Estimated left ventricular EF = 30%  · Left ventricular wall thickness is consistent with mild concentric hypertrophy.  · The findings are consistent with stress-induced (Takotsubo) cardiomyopathy.  · The cardiac valves are anatomically and functionally normal.  · Saline test results are negative.        Labs:  Lab Results   Component Value Date    CHOL 119 09/22/2022    TRIG 75 09/22/2022    HDL 53 09/22/2022    LDL 51 09/22/2022    AST 24 09/24/2022    ALT 18 09/24/2022     Lab Results   Component Value Date    HGBA1C 5.70 (H) 09/22/2022     Creatinine   Date Value Ref Range Status   10/05/2022 1.83 (H) 0.57 - 1.00 mg/dL Final   10/04/2022 1.77 (H) 0.57 - 1.00 mg/dL Final   10/02/2022 1.74 (H) 0.57 - 1.00 mg/dL Final     eGFR Non  Amer   Date Value Ref Range Status   01/28/2019 67 >60 mL/min/1.73 Final   01/26/2019 56 (L) >60 mL/min/1.73 Final   01/25/2019 " 42 (L) >60 mL/min/1.73 Final         ASSESSMENT:  Problem List Items Addressed This Visit        Cardiac and Vasculature    Essential hypertension (Chronic)    Hyperlipidemia (Chronic)    Takotsubo cardiomyopathy - Primary    Overview     · [facility] 09/27/22: minimal nonobstructive CAD  · EF 30% by echo 09/22/22            Neuro    Acute ischemic right MCA stroke (HCC)       PLAN:    1. Takotsubo CM  [facility] 09/2022 with minimal nonobstructive CAD, EF 30%  Currently on Entresto, Coreg, Spironolactone  Also on ASA, Plavix   Lifevest in place   Needs repeat echo in late December to relook EF   No symptoms of chest pain, dyspnea or heart failure     2.   Acute right MCA CVA  S/p tPA and mechanical thrombectomy  Needs follow up with Neurology  Continue ASA, Plavix  Etiology felt to be cardioembolic      3.  Hypertension  Goal <130/80mmHg  BP well controlled, continue current medicines     4. Hyperlipidemia  LDL 51  Continue Atorvastatin         Advance Care Planning   ACP discussion was held with the patient during this visit. Patient has an advance directive in EMR which is still valid.           Follow-up   Return in about 4 months (around 3/17/2023) for with RDS.        Electronically signed by Crista Muller PA-C, 11/17/22, 12:14 PM EST.

## 2022-11-17 ENCOUNTER — OFFICE VISIT (OUTPATIENT)
Dept: CARDIOLOGY | Facility: CLINIC | Age: 72
End: 2022-11-17

## 2022-11-17 VITALS
SYSTOLIC BLOOD PRESSURE: 98 MMHG | OXYGEN SATURATION: 97 % | BODY MASS INDEX: 28.99 KG/M2 | HEART RATE: 73 BPM | HEIGHT: 65 IN | WEIGHT: 174 LBS | DIASTOLIC BLOOD PRESSURE: 50 MMHG

## 2022-11-17 DIAGNOSIS — I10 ESSENTIAL HYPERTENSION: Chronic | ICD-10-CM

## 2022-11-17 DIAGNOSIS — E78.2 MIXED HYPERLIPIDEMIA: Chronic | ICD-10-CM

## 2022-11-17 DIAGNOSIS — I51.81 TAKOTSUBO CARDIOMYOPATHY: Primary | ICD-10-CM

## 2022-11-17 DIAGNOSIS — I63.511 ACUTE ISCHEMIC RIGHT MCA STROKE: ICD-10-CM

## 2022-11-17 PROCEDURE — 99213 OFFICE O/P EST LOW 20 MIN: CPT | Performed by: PHYSICIAN ASSISTANT

## 2022-11-17 RX ORDER — ASPIRIN 81 MG/1
81 TABLET ORAL DAILY
COMMUNITY

## 2022-11-17 RX ORDER — INSULIN DETEMIR 100 [IU]/ML
5 INJECTION, SOLUTION SUBCUTANEOUS DAILY
COMMUNITY
Start: 2022-10-31

## 2022-12-05 ENCOUNTER — TELEPHONE (OUTPATIENT)
Dept: CARDIOLOGY | Facility: CLINIC | Age: 72
End: 2022-12-05

## 2022-12-05 NOTE — TELEPHONE ENCOUNTER
[facility], it is scanned under cardiology tab. Pt is currently wearing her LifeVest. However, patient no longer wants to wear LifeVest and code status at [place] is DNR. Please review echo and advise.

## 2022-12-22 ENCOUNTER — CALL CENTER PROGRAMS (OUTPATIENT)
Dept: CALL CENTER | Facility: HOSPITAL | Age: 72
End: 2022-12-22

## 2022-12-22 NOTE — OUTREACH NOTE
Stroke [place] Survey    Flowsheet Row Responses   Facility patient discharged from? [facility]   Attempt successful No   Unsuccessful attempts Attempt 1          ASHLEY TORRES - Registered Nurse

## 2023-01-03 ENCOUNTER — CALL CENTER PROGRAMS (OUTPATIENT)
Dept: CALL CENTER | Facility: HOSPITAL | Age: 73
End: 2023-01-03
Payer: MEDICARE

## 2023-01-03 NOTE — OUTREACH NOTE
Stroke [place] Survey    Flowsheet Row Responses   Facility patient discharged from? [facility]   Attempt successful No   Unsuccessful attempts Attempt 2          STEPHANIE BRODERICK - Registered Nurse

## 2023-01-05 ENCOUNTER — CALL CENTER PROGRAMS (OUTPATIENT)
Dept: CALL CENTER | Facility: HOSPITAL | Age: 73
End: 2023-01-05
Payer: MEDICARE

## 2023-01-05 NOTE — OUTREACH NOTE
Stroke [place] Survey    Flowsheet Row Responses   Facility patient discharged from? [facility]   Attempt successful No   Unsuccessful attempts Attempt 3   Call Center [place] Score 7          ASHLEY TORRES - Registered Nurse

## 2023-05-10 NOTE — PROGRESS NOTES
OFFICE VISIT  NOTE  [facility] CARDIOLOGY MAIN CAMPUS      Name: Catherine Carroll    Date: 2023  MRN:  [id number]  :  1950      REFERRING/PRIMARY PROVIDER:  Kavon Allison MD     Chief Complaint   Patient presents with   • Takotsubo cardiomyopathy       HPI: Catherine Carroll is a 72 y.o. female who presents today for follow up follow up for CVA and Takotsubo CM 2022. She has history of diabetes, recent left hip fracture, CKD, hypertension, sleep apnea, dyslipidemia, generalized anxiety disorder, depression, anemia, multiple falls, and GERD who was transferred to [facility] for acute CVA 2022. She underwent mechanical thrombectomy of right MCA, and etiology was felt to be cardioembolic. Echo 22 demonstrated EF 30%, normal valves. Cardiac cath 22 showed minimal nonobstructive CAD. She was started on Plavix, Entresto, Coreg, Spironolactone. She does not ambulate due to left sided hemiplegia, and is in a wheelchair. Initially she was wearing a Lifevest, however this was discontinued per patient wishes and DNR status. Repeat echo 2022 with EF 30-35%.  Overall doing well denies chest pain or shortness of breath, denies PND or orthopnea.    ROS:Pertinent positives as listed in the HPI.  All other systems reviewed and negative.    Past Medical History:   Diagnosis Date   • Acid reflux    • Alopecia     severe   • Anxiety    • CHF (congestive heart failure)    • Chronic kidney disease     NKF classification) See under renal insuff - renal MD has limited her to 50 gms prot/day - this will signif affect her ability to be successful with WLS and may incr her risk of leak (gerardo as her prealb already low) - she still adamantly wishes to proceed.  Also has proteinuria   • Depression    • Diabetes mellitus    • Dyspepsia    • Dyspnea on exertion    • Fatigue    • Hyperlipidemia    • Hypertension    • Hypoalbuminemia     3.1   • Insomnia    • Joint pain    • Morbid obesity      (SUPER)   • Nephrolithiasis    • Obstructive sleep apnea     prior to her orig Band in 2007 she had RAVEN on CPAP (309 lbs).     • Proteinuria    • PTSD (post-traumatic stress disorder)    • Renal insufficiency     related to nephrolithiasis.  GFR 45, (L) 40%, (R) 60%,  follows w/ Dr. Bunch   • Stroke    • Type 2 diabetes mellitus     dx 1997, on insulin 10+ yrs, A1c 9.5 5/14/15.     • Urolithiasis        Past Surgical History:   Procedure Laterality Date   • BREAST LUMPECTOMY Right 2010    benign cyst   • CARDIAC CATHETERIZATION N/A 9/27/2022    Procedure: LEFT HEART CATH;  Surgeon: Mitch Underwood MD;  Location:  [facility] CATH [facility]VASIVE [facility];  Service: Cardiovascular;  Laterality: N/A;   • CHOLECYSTECTOMY     • CYSTOSCOPY     • CYSTOSCOPY W/ LITHOLAPAXY / EHL     • GASTRIC BANDING REMOVAL  11/2010    by [facility] for intolerance    • GASTRIC PORT CHANGE      lapband port reposition and shortening of lapband tubing after a port flip in 11/2007 by [facility]    • GASTRIC SLEEVE LAPAROSCOPIC      LAGB REG/ HHR 2/2007 by [facility]   • HIP TROCHANTERIC NAILING WITH INTRAMEDULLARY HIP SCREW Right 9/28/2016    Procedure: HIP TROCANTERIC NAILING WITH INTRAMEDULLARY HIP SCREW;  Surgeon: Deshawn Solis MD;  Location:  [facility];  Service:    • HIP TROCHANTERIC NAILING WITH INTRAMEDULLARY HIP SCREW Left 1/23/2019    Procedure: HIP TROCANTERIC NAILING LEFT;  Surgeon: Luther Thurston MD;  Location:  [facility];  Service: Orthopedics   • INTERVENTIONAL RADIOLOGY PROCEDURE N/A 9/21/2022    Procedure: IR MECHANICAL THROMBECTOMY - PRIMARY;  Surgeon: Randy Hair MD;  Location:  [facility]ATH INVASIVE LOC[facility];  Service: Interventional Radiology;  Laterality: N/A;   • KIDNEY SURGERY Left 2003    kidney surgery for embedded stent removal; multiple kidney stone removals and stent placements     • LAPAROSCOPIC CHOLECYSTECTOMY  2003    for gallstones   • LAPAROSCOPIC GASTRIC BANDING      Adjustable Gastric Band s/p LAGB APL/ HHR 8/2011 by [facility]     • SHOULDER SURGERY Left 12/27/2013   • TONSILLECTOMY  1957   • ULNA/RADIUS CLOSED REDUCTION Right 9/28/2016    Procedure: ULNA/RADIUS CLOSED REDUCTION;  Surgeon: Deshawn Solis MD;  Location: [facility];  Service:        Social History     Socioeconomic History   • Marital status: Single   Tobacco Use   • Smoking status: Never   • Smokeless tobacco: Never   Vaping Use   • Vaping Use: Never used   Substance and Sexual Activity   • Alcohol use: No   • Drug use: No   • Sexual activity: Defer       History reviewed. No pertinent family history.     Allergies   Allergen Reactions   • Abilify [Aripiprazole] Other (See Comments)     Doesn't remember reaction - kidney doctor stopped medication   • Aminoglycosides    • Nsaids Other (See Comments)     Can't take due to kidney function       Current Outpatient Medications   Medication Instructions   • aspirin 81 mg, Oral, Daily   • atorvastatin (LIPITOR) 80 mg, Oral, Nightly   • buPROPion XL (WELLBUTRIN XL) 300 MG 24 hr tablet No dose, route, or frequency recorded.   • buPROPion XL (WELLBUTRIN XL) 300 mg, Oral, Every Morning   • carbamide peroxide (DEBROX) 6.5 % otic solution 5 drops, Right Ear, 2 Times Daily PRN   • carvedilol (COREG) 3.125 mg, Oral, 2 Times Daily With Meals   • clopidogrel (PLAVIX) 75 mg, Oral, Every 24 Hours   • divalproex (DEPAKOTE) 125 MG DR tablet No dose, route, or frequency recorded.   • docusate sodium 100 mg, Oral, 2 Times Daily   • Dulaglutide 1.5 mg, Subcutaneous, Weekly, On Wednesdays   • estradiol (ESTRACE) 1 g, Vaginal, 3 Times Weekly   • famotidine (PEPCID) 20 mg, Oral, Nightly   • FLUoxetine (PROZAC) 40 mg, Oral, Every Morning   • Insulin Lispro (HUMALOG) 0-7 Units, Subcutaneous, 4 Times Daily With Meals & Nightly   • Levemir 5 Units, Daily   • levocetirizine (XYZAL) 5 mg, Oral, Every Evening   • Multiple Vitamins-Minerals (CENTRUM SILVER PO) 1 tablet, Oral, Daily   • oxybutynin XL (DITROPAN-XL) 5 mg, Oral, Daily   • pantoprazole (PROTONIX)  "20 mg, Oral, Daily   • QUEtiapine (SEROquel) 25 MG tablet No dose, route, or frequency recorded.   • sacubitril-valsartan (ENTRESTO) 24-26 MG tablet 1 tablet, Oral, Every 12 Hours Scheduled   • spironolactone (ALDACTONE) 25 mg, Oral, Daily   • tiZANidine (ZANAFLEX) 4 mg, Oral, 2 Times Daily   • traMADol (ULTRAM) 50 mg, Oral, Every 8 Hours PRN   • traZODone (DESYREL) 150 mg, Oral, Nightly       Vitals:    05/11/23 1501   BP: 102/64   BP Location: Right arm   Patient Position: Sitting   Pulse: 64   SpO2: 95%   Weight: 87.1 kg (192 lb)   Height: 165.1 cm (65\")     Body mass index is 31.95 kg/m².    PHYSICAL EXAM:    General Appearance:   · well developed  · well nourished  Neck:  · thyroid not enlarged  · supple  Respiratory:  · no respiratory distress  · normal breath sounds  · no rales  Cardiovascular:  · no jugular venous distention  · regular rhythm  · apical impulse normal  · S1 normal, S2 normal  · no S3, no S4   · no murmur  · no rub, no thrill  · carotid pulses normal; no bruit  · pedal pulses normal  · lower extremity edema: none    Skin:   warm, dry    RESULTS:   Procedures    Results for orders placed during the hospital encounter of 09/21/22    Adult Transthoracic Echo Complete W/ Cont if Necessary Per Protocol (With Agitated Saline)    Interpretation Summary  · Estimated left ventricular EF = 30%  · Left ventricular wall thickness is consistent with mild concentric hypertrophy.  · The findings are consistent with stress-induced (Takotsubo) cardiomyopathy.  · The cardiac valves are anatomically and functionally normal.  · Saline test results are negative.        Labs:  Lab Results   Component Value Date    CHOL 119 09/22/2022    TRIG 75 09/22/2022    HDL 53 09/22/2022    LDL 51 09/22/2022    AST 24 09/24/2022    ALT 18 09/24/2022     Lab Results   Component Value Date    HGBA1C 5.70 (H) 09/22/2022     Creatinine   Date Value Ref Range Status   10/05/2022 1.83 (H) 0.57 - 1.00 mg/dL Final   10/04/2022 1.77 " (H) 0.57 - 1.00 mg/dL Final   10/02/2022 1.74 (H) 0.57 - 1.00 mg/dL Final     eGFR Non  Amer   Date Value Ref Range Status   01/28/2019 67 >60 mL/min/1.73 Final   01/26/2019 56 (L) >60 mL/min/1.73 Final   01/25/2019 42 (L) >60 mL/min/1.73 Final         ASSESSMENT:  Problem List Items Addressed This Visit        Cardiac and Vasculature    Essential hypertension (Chronic)    Hyperlipidemia (Chronic)    Acute systolic heart failure (CMS/HCC)    Takotsubo cardiomyopathy - Primary    Overview     · [facility] 09/27/22: minimal nonobstructive CAD  · EF 30% by echo 09/22/22            Neuro    Acute ischemic right MCA stroke       PLAN:  1. Takotsubo CM  [facility] 09/2022 with minimal nonobstructive CAD, EF 30%  Currently on Entresto, Coreg, Spironolactone  Also on ASA, Plavix   Repeat echo 11/2022 with EF 30-35%  NO Lifevest or ICD due to DNR status and patient wishes       2.   Acute right MCA CVA  S/p tPA and mechanical thrombectomy  Needs follow up with Neurology  Continue ASA, Plavix  Etiology felt to be cardioembolic       3.  Hypertension  Goal <130/80mmHg  Well-controlled on current regimen, continue for now.     4. Hyperlipidemia  LDL 51  Continue Atorvastatin   Labs reviewed, LFTs normal, continue statin        Advance Care Planning   ACP discussion was held with the patient during this visit. Patient has an advance directive in EMR which is still valid.           Follow-up   Return in about 1 year (around 5/11/2024).    Mitch Underwood MD, FACC, [facility]  Interventional Cardiology

## 2023-05-11 ENCOUNTER — OFFICE VISIT (OUTPATIENT)
Dept: CARDIOLOGY | Facility: CLINIC | Age: 73
End: 2023-05-11
Payer: MEDICARE

## 2023-05-11 VITALS
SYSTOLIC BLOOD PRESSURE: 102 MMHG | OXYGEN SATURATION: 95 % | DIASTOLIC BLOOD PRESSURE: 64 MMHG | WEIGHT: 192 LBS | HEART RATE: 64 BPM | BODY MASS INDEX: 31.99 KG/M2 | HEIGHT: 65 IN

## 2023-05-11 DIAGNOSIS — I51.81 TAKOTSUBO CARDIOMYOPATHY: Primary | ICD-10-CM

## 2023-05-11 DIAGNOSIS — I10 ESSENTIAL HYPERTENSION: Chronic | ICD-10-CM

## 2023-05-11 DIAGNOSIS — E78.2 MIXED HYPERLIPIDEMIA: Chronic | ICD-10-CM

## 2023-05-11 DIAGNOSIS — I50.21 ACUTE SYSTOLIC HEART FAILURE: ICD-10-CM

## 2023-05-11 DIAGNOSIS — I63.511 ACUTE ISCHEMIC RIGHT MCA STROKE: ICD-10-CM

## 2023-05-11 RX ORDER — DIVALPROEX SODIUM 125 MG/1
TABLET, DELAYED RELEASE ORAL
COMMUNITY
Start: 2023-02-10

## 2023-05-11 RX ORDER — QUETIAPINE FUMARATE 25 MG/1
TABLET, FILM COATED ORAL
COMMUNITY
Start: 2023-04-19

## 2023-05-11 RX ORDER — BUPROPION HYDROCHLORIDE 300 MG/1
TABLET ORAL
COMMUNITY
Start: 2023-05-04

## 2024-08-12 ENCOUNTER — HOSPITAL ENCOUNTER (EMERGENCY)
Facility: HOSPITAL | Age: 74
Discharge: HOME | End: 2024-08-13
Attending: EMERGENCY MEDICINE
Payer: MEDICARE

## 2024-08-12 ENCOUNTER — APPOINTMENT (OUTPATIENT)
Dept: GENERAL RADIOLOGY | Facility: HOSPITAL | Age: 74
End: 2024-08-12
Payer: MEDICARE

## 2024-08-12 DIAGNOSIS — N17.9 ACUTE KIDNEY INJURY: ICD-10-CM

## 2024-08-12 DIAGNOSIS — E87.5 HYPERKALEMIA: ICD-10-CM

## 2024-08-12 DIAGNOSIS — R53.1 GENERAL WEAKNESS: Primary | ICD-10-CM

## 2024-08-12 LAB
BASOPHILS # BLD AUTO: 0.02 10*3/MM3 (ref 0–0.2)
BASOPHILS NFR BLD AUTO: 0.2 % (ref 0–1.5)
BILIRUB UR QL STRIP: NEGATIVE
CLARITY UR: CLEAR
COLOR UR: YELLOW
DEPRECATED RDW RBC AUTO: 42 FL (ref 37–54)
EOSINOPHIL # BLD AUTO: 0.1 10*3/MM3 (ref 0–0.4)
EOSINOPHIL NFR BLD AUTO: 1.2 % (ref 0.3–6.2)
ERYTHROCYTE [DISTWIDTH] IN BLOOD BY AUTOMATED COUNT: 12.2 % (ref 12.3–15.4)
ERYTHROCYTE [SEDIMENTATION RATE] IN BLOOD: 9 MM/HR (ref 0–30)
GLUCOSE BLDC GLUCOMTR-MCNC: 96 MG/DL (ref 70–130)
GLUCOSE UR STRIP-MCNC: NEGATIVE MG/DL
HCT VFR BLD AUTO: 40.6 % (ref 34–46.6)
HGB BLD-MCNC: 12.8 G/DL (ref 12–15.9)
HGB UR QL STRIP.AUTO: NEGATIVE
HOLD SPECIMEN: NORMAL
IMM GRANULOCYTES # BLD AUTO: 0.03 10*3/MM3 (ref 0–0.05)
IMM GRANULOCYTES NFR BLD AUTO: 0.4 % (ref 0–0.5)
KETONES UR QL STRIP: NEGATIVE
LEUKOCYTE ESTERASE UR QL STRIP.AUTO: NEGATIVE
LYMPHOCYTES # BLD AUTO: 1.01 10*3/MM3 (ref 0.7–3.1)
LYMPHOCYTES NFR BLD AUTO: 11.9 % (ref 19.6–45.3)
MCH RBC QN AUTO: 29.4 PG (ref 26.6–33)
MCHC RBC AUTO-ENTMCNC: 31.5 G/DL (ref 31.5–35.7)
MCV RBC AUTO: 93.3 FL (ref 79–97)
MONOCYTES # BLD AUTO: 0.48 10*3/MM3 (ref 0.1–0.9)
MONOCYTES NFR BLD AUTO: 5.7 % (ref 5–12)
NEUTROPHILS NFR BLD AUTO: 6.84 10*3/MM3 (ref 1.7–7)
NEUTROPHILS NFR BLD AUTO: 80.6 % (ref 42.7–76)
NITRITE UR QL STRIP: NEGATIVE
NRBC BLD AUTO-RTO: 0 /100 WBC (ref 0–0.2)
PH UR STRIP.AUTO: <=5 [PH] (ref 5–8)
PLATELET # BLD AUTO: 178 10*3/MM3 (ref 140–450)
PMV BLD AUTO: 10.2 FL (ref 6–12)
PROT UR QL STRIP: NEGATIVE
RBC # BLD AUTO: 4.35 10*6/MM3 (ref 3.77–5.28)
SP GR UR STRIP: 1.01 (ref 1–1.03)
UROBILINOGEN UR QL STRIP: NORMAL
WBC NRBC COR # BLD AUTO: 8.48 10*3/MM3 (ref 3.4–10.8)
WHOLE BLOOD HOLD COAG: NORMAL
WHOLE BLOOD HOLD SPECIMEN: NORMAL

## 2024-08-12 PROCEDURE — 83735 ASSAY OF MAGNESIUM: CPT | Performed by: EMERGENCY MEDICINE

## 2024-08-12 PROCEDURE — 87154 CUL TYP ID BLD PTHGN 6+ TRGT: CPT | Performed by: EMERGENCY MEDICINE

## 2024-08-12 PROCEDURE — 82948 REAGENT STRIP/BLOOD GLUCOSE: CPT

## 2024-08-12 PROCEDURE — 36415 COLL VENOUS BLD VENIPUNCTURE: CPT

## 2024-08-12 PROCEDURE — 87077 CULTURE AEROBIC IDENTIFY: CPT | Performed by: EMERGENCY MEDICINE

## 2024-08-12 PROCEDURE — 83605 ASSAY OF LACTIC ACID: CPT | Performed by: EMERGENCY MEDICINE

## 2024-08-12 PROCEDURE — 84443 ASSAY THYROID STIM HORMONE: CPT | Performed by: EMERGENCY MEDICINE

## 2024-08-12 PROCEDURE — 87147 CULTURE TYPE IMMUNOLOGIC: CPT | Performed by: EMERGENCY MEDICINE

## 2024-08-12 PROCEDURE — 93005 ELECTROCARDIOGRAM TRACING: CPT | Performed by: EMERGENCY MEDICINE

## 2024-08-12 PROCEDURE — 71045 X-RAY EXAM CHEST 1 VIEW: CPT

## 2024-08-12 PROCEDURE — 85652 RBC SED RATE AUTOMATED: CPT | Performed by: EMERGENCY MEDICINE

## 2024-08-12 PROCEDURE — 85025 COMPLETE CBC W/AUTO DIFF WBC: CPT | Performed by: EMERGENCY MEDICINE

## 2024-08-12 PROCEDURE — 87186 SC STD MICRODIL/AGAR DIL: CPT | Performed by: EMERGENCY MEDICINE

## 2024-08-12 PROCEDURE — 87040 BLOOD CULTURE FOR BACTERIA: CPT | Performed by: EMERGENCY MEDICINE

## 2024-08-12 PROCEDURE — 86140 C-REACTIVE PROTEIN: CPT | Performed by: EMERGENCY MEDICINE

## 2024-08-12 PROCEDURE — 80053 COMPREHEN METABOLIC PANEL: CPT | Performed by: EMERGENCY MEDICINE

## 2024-08-12 PROCEDURE — 99284 EMERGENCY DEPT VISIT MOD MDM: CPT

## 2024-08-12 PROCEDURE — 81003 URINALYSIS AUTO W/O SCOPE: CPT | Performed by: EMERGENCY MEDICINE

## 2024-08-12 PROCEDURE — 84145 PROCALCITONIN (PCT): CPT | Performed by: EMERGENCY MEDICINE

## 2024-08-12 PROCEDURE — 0202U NFCT DS 22 TRGT SARS-COV-2: CPT | Performed by: EMERGENCY MEDICINE

## 2024-08-12 RX ORDER — SODIUM CHLORIDE 0.9 % (FLUSH) 0.9 %
10 SYRINGE (ML) INJECTION AS NEEDED
Status: DISCONTINUED | OUTPATIENT
Start: 2024-08-12 | End: 2024-08-13 | Stop reason: HOSPADM

## 2024-08-13 VITALS
TEMPERATURE: 97.8 F | BODY MASS INDEX: 45.82 KG/M2 | RESPIRATION RATE: 18 BRPM | OXYGEN SATURATION: 96 % | SYSTOLIC BLOOD PRESSURE: 95 MMHG | HEIGHT: 65 IN | WEIGHT: 275 LBS | HEART RATE: 72 BPM | DIASTOLIC BLOOD PRESSURE: 61 MMHG

## 2024-08-13 LAB
ALBUMIN SERPL-MCNC: 3.4 G/DL (ref 3.5–5.2)
ALBUMIN SERPL-MCNC: 3.5 G/DL (ref 3.5–5.2)
ALBUMIN/GLOB SERPL: 1.3 G/DL
ALBUMIN/GLOB SERPL: 1.3 G/DL
ALP SERPL-CCNC: 53 U/L (ref 39–117)
ALP SERPL-CCNC: 54 U/L (ref 39–117)
ALT SERPL W P-5'-P-CCNC: 7 U/L (ref 1–33)
ALT SERPL W P-5'-P-CCNC: 8 U/L (ref 1–33)
ANION GAP SERPL CALCULATED.3IONS-SCNC: 10 MMOL/L (ref 5–15)
ANION GAP SERPL CALCULATED.3IONS-SCNC: 13 MMOL/L (ref 5–15)
ANION GAP SERPL CALCULATED.3IONS-SCNC: 8 MMOL/L (ref 5–15)
AST SERPL-CCNC: 18 U/L (ref 1–32)
AST SERPL-CCNC: 21 U/L (ref 1–32)
B PARAPERT DNA SPEC QL NAA+PROBE: NOT DETECTED
B PERT DNA SPEC QL NAA+PROBE: NOT DETECTED
BACTERIA BLD CULT: ABNORMAL
BACTERIA ID TEST ISLT QL CULT: ABNORMAL
BILIRUB SERPL-MCNC: 0.2 MG/DL (ref 0–1.2)
BILIRUB SERPL-MCNC: 0.2 MG/DL (ref 0–1.2)
BOTTLE TYPE: ABNORMAL
BUN SERPL-MCNC: 30 MG/DL (ref 8–23)
BUN SERPL-MCNC: 31 MG/DL (ref 8–23)
BUN SERPL-MCNC: 33 MG/DL (ref 8–23)
BUN/CREAT SERPL: 17.1 (ref 7–25)
BUN/CREAT SERPL: 17.9 (ref 7–25)
BUN/CREAT SERPL: 18.9 (ref 7–25)
C PNEUM DNA NPH QL NAA+NON-PROBE: NOT DETECTED
CALCIUM SPEC-SCNC: 9.1 MG/DL (ref 8.6–10.5)
CALCIUM SPEC-SCNC: 9.1 MG/DL (ref 8.6–10.5)
CALCIUM SPEC-SCNC: 9.4 MG/DL (ref 8.6–10.5)
CHLORIDE SERPL-SCNC: 101 MMOL/L (ref 98–107)
CHLORIDE SERPL-SCNC: 102 MMOL/L (ref 98–107)
CHLORIDE SERPL-SCNC: 105 MMOL/L (ref 98–107)
CO2 SERPL-SCNC: 19 MMOL/L (ref 22–29)
CO2 SERPL-SCNC: 23 MMOL/L (ref 22–29)
CO2 SERPL-SCNC: 24 MMOL/L (ref 22–29)
CREAT SERPL-MCNC: 1.73 MG/DL (ref 0.57–1)
CREAT SERPL-MCNC: 1.75 MG/DL (ref 0.57–1)
CREAT SERPL-MCNC: 1.75 MG/DL (ref 0.57–1)
CRP SERPL-MCNC: <0.3 MG/DL (ref 0–0.5)
D-LACTATE SERPL-SCNC: 1 MMOL/L (ref 0.5–2)
EGFRCR SERPLBLD CKD-EPI 2021: 30.5 ML/MIN/1.73
EGFRCR SERPLBLD CKD-EPI 2021: 30.5 ML/MIN/1.73
EGFRCR SERPLBLD CKD-EPI 2021: 30.9 ML/MIN/1.73
FLUAV SUBTYP SPEC NAA+PROBE: NOT DETECTED
FLUBV RNA ISLT QL NAA+PROBE: NOT DETECTED
GLOBULIN UR ELPH-MCNC: 2.6 GM/DL
GLOBULIN UR ELPH-MCNC: 2.8 GM/DL
GLUCOSE BLDC GLUCOMTR-MCNC: 111 MG/DL (ref 70–130)
GLUCOSE BLDC GLUCOMTR-MCNC: 131 MG/DL (ref 70–130)
GLUCOSE SERPL-MCNC: 118 MG/DL (ref 65–99)
GLUCOSE SERPL-MCNC: 120 MG/DL (ref 65–99)
GLUCOSE SERPL-MCNC: 143 MG/DL (ref 65–99)
HADV DNA SPEC NAA+PROBE: NOT DETECTED
HCOV 229E RNA SPEC QL NAA+PROBE: NOT DETECTED
HCOV HKU1 RNA SPEC QL NAA+PROBE: NOT DETECTED
HCOV NL63 RNA SPEC QL NAA+PROBE: NOT DETECTED
HCOV OC43 RNA SPEC QL NAA+PROBE: NOT DETECTED
HMPV RNA NPH QL NAA+NON-PROBE: NOT DETECTED
HPIV1 RNA ISLT QL NAA+PROBE: NOT DETECTED
HPIV2 RNA SPEC QL NAA+PROBE: NOT DETECTED
HPIV3 RNA NPH QL NAA+PROBE: NOT DETECTED
HPIV4 P GENE NPH QL NAA+PROBE: NOT DETECTED
M PNEUMO IGG SER IA-ACNC: NOT DETECTED
MAGNESIUM SERPL-MCNC: 1.7 MG/DL (ref 1.6–2.4)
POTASSIUM SERPL-SCNC: 5.1 MMOL/L (ref 3.5–5.2)
POTASSIUM SERPL-SCNC: 5.2 MMOL/L (ref 3.5–5.2)
POTASSIUM SERPL-SCNC: 5.9 MMOL/L (ref 3.5–5.2)
PROCALCITONIN SERPL-MCNC: 0.05 NG/ML (ref 0–0.25)
PROT SERPL-MCNC: 6 G/DL (ref 6–8.5)
PROT SERPL-MCNC: 6.3 G/DL (ref 6–8.5)
RHINOVIRUS RNA SPEC NAA+PROBE: NOT DETECTED
RSV RNA NPH QL NAA+NON-PROBE: NOT DETECTED
SARS-COV-2 RNA NPH QL NAA+NON-PROBE: NOT DETECTED
SODIUM SERPL-SCNC: 134 MMOL/L (ref 136–145)
SODIUM SERPL-SCNC: 134 MMOL/L (ref 136–145)
SODIUM SERPL-SCNC: 137 MMOL/L (ref 136–145)
TSH SERPL DL<=0.05 MIU/L-ACNC: 4.41 UIU/ML (ref 0.27–4.2)

## 2024-08-13 PROCEDURE — 96374 THER/PROPH/DIAG INJ IV PUSH: CPT

## 2024-08-13 PROCEDURE — 94640 AIRWAY INHALATION TREATMENT: CPT

## 2024-08-13 PROCEDURE — 63710000001 INSULIN REGULAR HUMAN PER 5 UNITS: Performed by: EMERGENCY MEDICINE

## 2024-08-13 PROCEDURE — 80053 COMPREHEN METABOLIC PANEL: CPT | Performed by: EMERGENCY MEDICINE

## 2024-08-13 PROCEDURE — 82948 REAGENT STRIP/BLOOD GLUCOSE: CPT

## 2024-08-13 RX ORDER — ALBUTEROL SULFATE 2.5 MG/3ML
5 SOLUTION RESPIRATORY (INHALATION) ONCE
Status: COMPLETED | OUTPATIENT
Start: 2024-08-13 | End: 2024-08-13

## 2024-08-13 RX ORDER — DEXTROSE MONOHYDRATE 25 G/50ML
25 INJECTION, SOLUTION INTRAVENOUS ONCE
Status: COMPLETED | OUTPATIENT
Start: 2024-08-13 | End: 2024-08-13

## 2024-08-13 RX ADMIN — SODIUM ZIRCONIUM CYCLOSILICATE 10 G: 10 POWDER, FOR SUSPENSION ORAL at 01:47

## 2024-08-13 RX ADMIN — INSULIN HUMAN 10 UNITS: 100 INJECTION, SOLUTION PARENTERAL at 01:43

## 2024-08-13 RX ADMIN — ALBUTEROL SULFATE 5 MG: 2.5 SOLUTION RESPIRATORY (INHALATION) at 01:49

## 2024-08-13 RX ADMIN — DEXTROSE MONOHYDRATE 25 G: 25 INJECTION, SOLUTION INTRAVENOUS at 01:42

## 2024-08-13 NOTE — ED PROVIDER NOTES
Subjective   History of Present Illness  This is a 73-year-old female with past ministry of CHF and chronic kidney disease presented to the emergency department some generalized weakness.  The patient was sent in from nursing home facility.  They state that she has been more lethargic than normal.  Patient was recently treated for urinary tract infection.  Patient states that she just feels weak.  She has not had any falls.  No syncope.  Denies any fevers or chills.  No headache or change in vision.  No focal weakness.  No chest pain or shortness of breath.  No abdominal pain or vomiting.    History provided by:  Patient, EMS personnel and nursing home   used: No        Review of Systems   Constitutional:  Positive for fatigue. Negative for chills and fever.   HENT:  Negative for congestion, ear pain and sore throat.    Eyes:  Negative for visual disturbance.   Respiratory:  Negative for shortness of breath.    Cardiovascular:  Negative for chest pain.   Gastrointestinal:  Negative for abdominal pain.   Genitourinary:  Negative for difficulty urinating.   Musculoskeletal:  Negative for arthralgias.   Skin:  Negative for rash.   Neurological:  Positive for weakness. Negative for dizziness and numbness.   Psychiatric/Behavioral:  Negative for agitation.        Past Medical History:   Diagnosis Date    Acid reflux     Alopecia     severe    Anxiety     CHF (congestive heart failure)     Chronic kidney disease     NKF classification) See under renal insuff - renal MD has limited her to 50 gms prot/day - this will signif affect her ability to be successful with WLS and may incr her risk of leak (gerardo as her prealb already low) - she still adamantly wishes to proceed.  Also has proteinuria    Depression     Diabetes mellitus     Dyspepsia     Dyspnea on exertion     Fatigue     Hyperlipidemia     Hypertension     Hypoalbuminemia     3.1    Insomnia     Joint pain     Morbid obesity     (SUPER)     Nephrolithiasis     Obstructive sleep apnea     prior to her orig Band in 2007 she had RAVEN on CPAP (309 lbs).      Proteinuria     PTSD (post-traumatic stress disorder)     Renal insufficiency     related to nephrolithiasis.  GFR 45, (L) 40%, (R) 60%,  follows w/ Dr. Bunch    Stroke     Type 2 diabetes mellitus     dx 1997, on insulin 10+ yrs, A1c 9.5 5/14/15.      Urolithiasis        Allergies   Allergen Reactions    Abilify [Aripiprazole] Other (See Comments)     Doesn't remember reaction - kidney doctor stopped medication    Aminoglycosides     Nsaids Other (See Comments)     Can't take due to kidney function       Past Surgical History:   Procedure Laterality Date    BREAST LUMPECTOMY Right 2010    benign cyst    CARDIAC CATHETERIZATION N/A 9/27/2022    Procedure: LEFT HEART CATH;  Surgeon: Mitch Underwood MD;  Location:  [facility];  Service: Cardiovascular;  Laterality: N/A;    CHOLECYSTECTOMY      CYSTOSCOPY      CYSTOSCOPY W/ LITHOLAPAXY / EHL      GASTRIC BANDING REMOVAL  11/2010    by [facility] for intolerance     GASTRIC PORT CHANGE      lapband port reposition and shortening of lapband tubing after a port flip in 11/2007 by O     GASTRIC SLEEVE LAPAROSCOPIC      LAGB REG/ HHR 2/2007 by [facility]    HIP TROCHANTERIC NAILING WITH INTRAMEDULLARY HIP SCREW Right 9/28/2016    Procedure: HIP TROCANTERIC NAILING WITH INTRAMEDULLARY HIP SCREW;  Surgeon: Deshawn Solis MD;  Location:  [facility];  Service:     HIP TROCHANTERIC NAILING WITH INTRAMEDULLARY HIP SCREW Left 1/23/2019    Procedure: HIP TROCANTERIC NAILING LEFT;  Surgeon: Luther Thurston MD;  Location:  [facility];  Service: Orthopedics    INTERVENTIONAL RADIOLOGY PROCEDURE N/A 9/21/2022    Procedure: IR MECHANICAL THROMBECTOMY - PRIMARY;  Surgeon: Randy Hair MD;  Location:  [facility];  Service: Interventional Radiology;  Laterality: N/A;    KIDNEY SURGERY Left 2003    kidney surgery for embedded stent removal;  multiple kidney stone removals and stent placements      LAPAROSCOPIC CHOLECYSTECTOMY  2003    for gallstones    LAPAROSCOPIC GASTRIC BANDING      Adjustable Gastric Band s/p LAGB APL/ HHR 8/2011 by [facility]     SHOULDER SURGERY Left 12/27/2013    TONSILLECTOMY  1957    ULNA/RADIUS CLOSED REDUCTION Right 9/28/2016    Procedure: ULNA/RADIUS CLOSED REDUCTION;  Surgeon: Deshawn Solis MD;  Location: [facility];  Service:        History reviewed. No pertinent family history.    Social History     Socioeconomic History    Marital status: Single   Tobacco Use    Smoking status: Never    Smokeless tobacco: Never   Vaping Use    Vaping status: Never Used   Substance and Sexual Activity    Alcohol use: No    Drug use: No    Sexual activity: Defer           Objective   Physical Exam  Vitals and nursing note reviewed.   Constitutional:       General: She is not in acute distress.     Appearance: She is not ill-appearing or toxic-appearing.   HENT:      Mouth/Throat:      Pharynx: No posterior oropharyngeal erythema.   Eyes:      Conjunctiva/sclera: Conjunctivae normal.      Pupils: Pupils are equal, round, and reactive to light.   Cardiovascular:      Rate and Rhythm: Normal rate and regular rhythm.   Pulmonary:      Effort: Pulmonary effort is normal. No respiratory distress.   Abdominal:      General: Abdomen is flat. There is no distension.      Palpations: There is no mass.      Tenderness: There is no abdominal tenderness. There is no guarding or rebound.   Musculoskeletal:         General: No deformity. Normal range of motion.   Skin:     General: Skin is warm.      Findings: No rash.   Neurological:      General: No focal deficit present.      Mental Status: She is alert and oriented to person, place, and time.      Motor: No weakness.         Procedures           ED Course  ED Course as of 08/13/24 0519   Mon Aug 12, 2024   2347 BP: 94/46 [JK]   2347 Temp: 97.8 °F (36.6 °C) [JK]   2347 Temp src: Oral [JK]   2347 Heart Rate:  81 [JK]   2347 Resp: 18 [JK]   2347 SpO2: 99 % [JK]   2347 Device (Oxygen Therapy): room air  Interpretation:  Patient's repeat vitals, telemetry tracing, and pulse oximetry tracing were directly viewed and interpreted by myself.   O2 sat 99% on room air, interpreted as normal  Telemetry rhythm strip revealed a rate of 81 bpm, interpreted as normal sinus rhythm [JK]   Tue Aug 13, 2024   0517 Comprehensive Metabolic Panel(!) [JK]   0517 CBC & Differential(!) [JK]   0517 Lactic Acid, Plasma [JK]   0517 C-reactive Protein [JK]   0517 Magnesium [JK]   0517 Procalcitonin [JK]   0517 TSH(!) [JK]   0517 Sedimentation Rate [JK]   0517 Respiratory Panel PCR w/COVID-19(SARS-CoV-2) [facility] In-House, NP Swab in UTM/VTM, 2 HR TAT - Swab, Nasopharynx [JK]   0517 Urinalysis With Microscopic If Indicated (No Culture) - Urine, Clean Catch [JK]   0517 Interpretation:  Laboratory studies were reviewed and interpreted directly by myself.  CMP showed some mild acute kidney injury with a BUN of 30 and creatinine 1.75, hyperkalemia with potassium 5.9, CBC normal, lactic normal, CRP normal, magnesium normal, Pro-Pj normal, urinalysis normal, respiratory panel normal [JK]   0517 XR Chest 1 View  Interpretation:  Imaging was directly visualized by myself, per my interpretations, chest x-ray was unremarkable. [JK]   0517 Patient's findings consistent with acute kidney injury and some elevated potassium.  Is likely this explains her fatigue.  Urine is clear at this time.  Patient was administered hyperkalemia cocktail. [JK]   0518 Basic Metabolic Panel(!)  Repeat basic metabolic panel shows improvement and the patient's potassium at 5.2 [JK]   0518 On reevaluation, patient is back to baseline.  There is been no abnormalities in her EKG tracing.  We had a discussion about avoiding potassium sparing medications.  Will follow-up with PCP in 24 hours or return to the emergency department.  Verbalized understanding. [JK]   0518 I  had a discussion with the patient/family regarding diagnosis, diagnostic results, treatment plan, and medications. The patient/family indicated understanding of these instructions. I spent adequate time at the bedside prior to discharge necessary to discuss the aftercare instructions, giving patient education, providing explanations of the results of our evaluations/findings, and my decision making to assure that the patient/family understand the plan of care. Time was allotted to answer questions at that time and throughout the ED course. Patient is required to maintain timely follow up, as discussed. I also discussed the potential for the development of an acute emergent condition requiring further evaluation, return to the ER, admission, or even surgical intervention.  I encouraged the patient to return to the emergency department immediately for any concerns, worsening symptoms, new complaints, or if symptoms persist and they are unable to seek follow-up in a timely fashion. The patient/family expressed understanding and agreement with this plan    Shared decision making:   After full review of the patient's clinical presentation, review of any work-up including but not limited to laboratory studies and radiology obtained, I had a discussion with the patient.  Treatment options were discussed as well as the risks, benefits and consequences.  I discussed all findings with the patient and family members if available.  During the discussion, treatment goals were understood by all as well as any misconceptions which were addressed with the patient.  Ample time was given for any questions they may have had.  They are in agreement with the treatment plan as well as final disposition. [JK]      ED Course User Index  [JK] Fransico Joseph MD                                             Medical Decision Making  This is a 73-year-old female with a history of CKD and CHF presented to the emergency department with some  generalized weakness.  The patient was sent in from nursing facility after some overall fatigue over the last few days.  Patient was recently treated for urinary tract infection.  Overall, the patient is nontoxic.  Afebrile.  IV access was established and the patient.  Placed on continuous telemetry monitoring.  Given the patient's presentation, differential is broad and will require further evaluation.  Workup initiated.      Differential diagnosis: Generalized weakness, acute kidney injury, electrolyte abnormality, anemia, UTI, sepsis, pneumonia      Amount and/or Complexity of Data Reviewed  Independent Historian: EMS  External Data Reviewed: labs, radiology, ECG and notes.     Details: External laboratories, imaging as well as notes were reviewed personally by myself.  All relevant studies were used to guide decision making.     Date of previous record: 7/18/2024    Source of note: [facility]    Summary: Patient was seen for cystitis.  I did review basic laboratory studies on file as well as a previous chest x-ray and EKG.  Records viewed    Labs: ordered. Decision-making details documented in ED Course.  Radiology: ordered and independent interpretation performed. Decision-making details documented in ED Course.  ECG/medicine tests: ordered and independent interpretation performed. Decision-making details documented in ED Course.    Risk  OTC drugs.  Prescription drug management.        Final diagnoses:   General weakness   Acute kidney injury   Hyperkalemia       ED Disposition  ED Disposition       ED Disposition   Discharge    Condition   Stable    Comment   --               Kavon Allison MD  [address] [place] [address]  [phone]    Call in 1 day           Medication List      No changes were made to your prescriptions during this visit.            Fransico Joseph MD  08/13/24 0519

## 2024-08-14 ENCOUNTER — TELEPHONE (OUTPATIENT)
Dept: EMERGENCY DEPT | Facility: HOSPITAL | Age: 74
End: 2024-08-14
Payer: MEDICARE

## 2024-08-14 LAB
QT INTERVAL: 438 MS
QTC INTERVAL: 475 MS

## 2024-08-15 ENCOUNTER — TELEPHONE (OUTPATIENT)
Dept: EMERGENCY DEPT | Facility: HOSPITAL | Age: 74
End: 2024-08-15
Payer: MEDICARE

## 2024-08-15 ENCOUNTER — HOSPITAL ENCOUNTER (EMERGENCY)
Facility: HOSPITAL | Age: 74
Discharge: HOME | End: 2024-08-15
Attending: EMERGENCY MEDICINE
Payer: MEDICARE

## 2024-08-15 VITALS
DIASTOLIC BLOOD PRESSURE: 63 MMHG | HEART RATE: 74 BPM | TEMPERATURE: 98.8 F | BODY MASS INDEX: 45.91 KG/M2 | SYSTOLIC BLOOD PRESSURE: 134 MMHG | HEIGHT: 65 IN | OXYGEN SATURATION: 100 % | WEIGHT: 275.57 LBS | RESPIRATION RATE: 16 BRPM

## 2024-08-15 DIAGNOSIS — R79.89 ELEVATED SERUM CREATININE: ICD-10-CM

## 2024-08-15 DIAGNOSIS — Z78.9 RESIDES IN SKILLED NURSING FACILITY: ICD-10-CM

## 2024-08-15 DIAGNOSIS — R78.81 POSITIVE BLOOD CULTURE: Primary | ICD-10-CM

## 2024-08-15 LAB
ALBUMIN SERPL-MCNC: 3.5 G/DL (ref 3.5–5.2)
ALBUMIN/GLOB SERPL: 1.1 G/DL
ALP SERPL-CCNC: 59 U/L (ref 39–117)
ALT SERPL W P-5'-P-CCNC: 8 U/L (ref 1–33)
ANION GAP SERPL CALCULATED.3IONS-SCNC: 8 MMOL/L (ref 5–15)
AST SERPL-CCNC: 18 U/L (ref 1–32)
BASOPHILS # BLD AUTO: 0.03 10*3/MM3 (ref 0–0.2)
BASOPHILS NFR BLD AUTO: 0.6 % (ref 0–1.5)
BILIRUB SERPL-MCNC: 0.3 MG/DL (ref 0–1.2)
BILIRUB UR QL STRIP: NEGATIVE
BUN SERPL-MCNC: 36 MG/DL (ref 8–23)
BUN/CREAT SERPL: 20.2 (ref 7–25)
CALCIUM SPEC-SCNC: 9 MG/DL (ref 8.6–10.5)
CHLORIDE SERPL-SCNC: 105 MMOL/L (ref 98–107)
CLARITY UR: CLEAR
CO2 SERPL-SCNC: 24 MMOL/L (ref 22–29)
COLOR UR: YELLOW
CREAT SERPL-MCNC: 1.78 MG/DL (ref 0.57–1)
D-LACTATE SERPL-SCNC: 1.1 MMOL/L (ref 0.5–2)
DEPRECATED RDW RBC AUTO: 43 FL (ref 37–54)
EGFRCR SERPLBLD CKD-EPI 2021: 29.8 ML/MIN/1.73
EOSINOPHIL # BLD AUTO: 0.12 10*3/MM3 (ref 0–0.4)
EOSINOPHIL NFR BLD AUTO: 2.3 % (ref 0.3–6.2)
ERYTHROCYTE [DISTWIDTH] IN BLOOD BY AUTOMATED COUNT: 12.4 % (ref 12.3–15.4)
GLOBULIN UR ELPH-MCNC: 3.1 GM/DL
GLUCOSE SERPL-MCNC: 152 MG/DL (ref 65–99)
GLUCOSE UR STRIP-MCNC: NEGATIVE MG/DL
HCT VFR BLD AUTO: 39.1 % (ref 34–46.6)
HGB BLD-MCNC: 12.5 G/DL (ref 12–15.9)
HGB UR QL STRIP.AUTO: NEGATIVE
HOLD SPECIMEN: NORMAL
IMM GRANULOCYTES # BLD AUTO: 0.01 10*3/MM3 (ref 0–0.05)
IMM GRANULOCYTES NFR BLD AUTO: 0.2 % (ref 0–0.5)
KETONES UR QL STRIP: NEGATIVE
LEUKOCYTE ESTERASE UR QL STRIP.AUTO: NEGATIVE
LYMPHOCYTES # BLD AUTO: 1.93 10*3/MM3 (ref 0.7–3.1)
LYMPHOCYTES NFR BLD AUTO: 37.1 % (ref 19.6–45.3)
MCH RBC QN AUTO: 29.8 PG (ref 26.6–33)
MCHC RBC AUTO-ENTMCNC: 32 G/DL (ref 31.5–35.7)
MCV RBC AUTO: 93.3 FL (ref 79–97)
MONOCYTES # BLD AUTO: 0.59 10*3/MM3 (ref 0.1–0.9)
MONOCYTES NFR BLD AUTO: 11.3 % (ref 5–12)
NEUTROPHILS NFR BLD AUTO: 2.52 10*3/MM3 (ref 1.7–7)
NEUTROPHILS NFR BLD AUTO: 48.5 % (ref 42.7–76)
NITRITE UR QL STRIP: NEGATIVE
NRBC BLD AUTO-RTO: 0 /100 WBC (ref 0–0.2)
PH UR STRIP.AUTO: <=5 [PH] (ref 5–8)
PLATELET # BLD AUTO: 177 10*3/MM3 (ref 140–450)
PMV BLD AUTO: 10 FL (ref 6–12)
POTASSIUM SERPL-SCNC: 4.8 MMOL/L (ref 3.5–5.2)
PROCALCITONIN SERPL-MCNC: 0.06 NG/ML (ref 0–0.25)
PROT SERPL-MCNC: 6.6 G/DL (ref 6–8.5)
PROT UR QL STRIP: NEGATIVE
RBC # BLD AUTO: 4.19 10*6/MM3 (ref 3.77–5.28)
SODIUM SERPL-SCNC: 137 MMOL/L (ref 136–145)
SP GR UR STRIP: 1.02 (ref 1–1.03)
UROBILINOGEN UR QL STRIP: NORMAL
WBC NRBC COR # BLD AUTO: 5.2 10*3/MM3 (ref 3.4–10.8)
WHOLE BLOOD HOLD COAG: NORMAL
WHOLE BLOOD HOLD SPECIMEN: NORMAL

## 2024-08-15 PROCEDURE — 83605 ASSAY OF LACTIC ACID: CPT | Performed by: EMERGENCY MEDICINE

## 2024-08-15 PROCEDURE — 85025 COMPLETE CBC W/AUTO DIFF WBC: CPT | Performed by: EMERGENCY MEDICINE

## 2024-08-15 PROCEDURE — 80053 COMPREHEN METABOLIC PANEL: CPT | Performed by: EMERGENCY MEDICINE

## 2024-08-15 PROCEDURE — 36415 COLL VENOUS BLD VENIPUNCTURE: CPT

## 2024-08-15 PROCEDURE — 87040 BLOOD CULTURE FOR BACTERIA: CPT | Performed by: EMERGENCY MEDICINE

## 2024-08-15 PROCEDURE — 84145 PROCALCITONIN (PCT): CPT | Performed by: EMERGENCY MEDICINE

## 2024-08-15 PROCEDURE — 99283 EMERGENCY DEPT VISIT LOW MDM: CPT

## 2024-08-15 PROCEDURE — 81003 URINALYSIS AUTO W/O SCOPE: CPT | Performed by: EMERGENCY MEDICINE

## 2024-08-15 RX ORDER — SODIUM CHLORIDE 0.9 % (FLUSH) 0.9 %
10 SYRINGE (ML) INJECTION AS NEEDED
Status: DISCONTINUED | OUTPATIENT
Start: 2024-08-15 | End: 2024-08-16 | Stop reason: HOSPADM

## 2024-08-15 NOTE — TELEPHONE ENCOUNTER
I spoke to the nurse Monica Quinones at [facility].  I advised her of the positive pulmonary blood culture with the second 1 pulmonary being negative.  I have faxed her these results and she will pass this along to the attending physician for management.  I did advise her if the patient is experiencing any fever or chills illness or appears toxic she should return to the ER at once for further evaluation however if not this could also be related to contamination but no matter what should be followed.  I have faxed it to the #[phone] and it was repeated and verified by her.  I did advise her if she does not receive the fax in the next 10 minutes to call back and we will refax this.

## 2024-08-15 NOTE — DISCHARGE INSTRUCTIONS
Continue to monitor your symptoms.  We have redrawn your blood cultures and will monitor these results.  Should she develop fever, shortness of breath, or other concerns return to the emergency department.

## 2024-08-17 LAB
BACTERIA SPEC AEROBE CULT: ABNORMAL
BACTERIA SPEC AEROBE CULT: ABNORMAL
GRAM STN SPEC: ABNORMAL
GRAM STN SPEC: ABNORMAL
ISOLATED FROM: ABNORMAL
ISOLATED FROM: ABNORMAL

## 2024-08-18 LAB — BACTERIA SPEC AEROBE CULT: NORMAL

## 2024-08-20 LAB
BACTERIA SPEC AEROBE CULT: NORMAL
BACTERIA SPEC AEROBE CULT: NORMAL

## (undated) DEVICE — RADIFOCUS GLIDEWIRE: Brand: GLIDEWIRE

## (undated) DEVICE — PROXIMATE RH ROTATING HEAD SKIN STAPLERS (35 WIDE) CONTAINS 35 STAINLESS STEEL STAPLES: Brand: PROXIMATE

## (undated) DEVICE — 3M™ DURAPORE™ SURGICAL TAPE 1538-3, 3 INCH X 10 YARD (7,5CM X 9,1M), 4 ROLLS/BOX: Brand: 3M™ DURAPORE™

## (undated) DEVICE — CATH DIAG EXPO M/ PK 5F FL4/FR4 PIG

## (undated) DEVICE — PK MAJ FX HIP 10

## (undated) DEVICE — ENCORE® LATEX MICRO SIZE 8, STERILE LATEX POWDER-FREE SURGICAL GLOVE: Brand: ENCORE

## (undated) DEVICE — RADIFOCUS TORQUE DEVICE MULTI-TORQUE VISE: Brand: RADIFOCUS TORQUE DEVICE

## (undated) DEVICE — BALLOON GUIDE CATHETER: Brand: FLOWGATE2

## (undated) DEVICE — SYS SKIN CLS DERMABOND PRINEO W/22CM MESH TP

## (undated) DEVICE — ANGIO-SEAL VIP VASCULAR CLOSURE DEVICE: Brand: ANGIO-SEAL

## (undated) DEVICE — SUCTION CANISTER, 2500CC, RIGID: Brand: DEROYAL

## (undated) DEVICE — ANTIBACTERIAL UNDYED BRAIDED (POLYGLACTIN 910), SYNTHETIC ABSORBABLE SUTURE: Brand: COATED VICRYL

## (undated) DEVICE — LEX NEURO ANGIOGRAPHY: Brand: MEDLINE INDUSTRIES, INC.

## (undated) DEVICE — INTRO SHEATH ENGAGE W/50 GW .038 8F12

## (undated) DEVICE — MODEL BT2000 P/N 700287-012KIT CONTENTS: MANIFOLD WITH SALINE AND CONTRAST PORTS, SALINE TUBING WITH SPIKE AND HAND SYRINGE, TRANSDUCER: Brand: BT2000 AUTOMATED MANIFOLD KIT

## (undated) DEVICE — STPCK 3/WY HP M/RA W/OFF/HNDL 1050PSI STRL

## (undated) DEVICE — STNT RETRV SOLITAIREX REVASCULARIZATION 3X20MM 10MM 150CM

## (undated) DEVICE — GW FOR TROCH NAIL 3.2X400MM

## (undated) DEVICE — DRAPE,TOP,102X53,STERILE: Brand: MEDLINE

## (undated) DEVICE — Device

## (undated) DEVICE — GUIDEWIRE WITH ICE™ HYDROPHILIC COATING: Brand: TRANSEND™ EX

## (undated) DEVICE — GW PERIPH VASC ADX J/TP SS .035 150CM 3MM

## (undated) DEVICE — CVR HNDL LT SURG ACCSSRY BLU STRL

## (undated) DEVICE — ROTATING HEMOSTATIC VALVE .096": Brand: RHV

## (undated) DEVICE — MODEL AT P65, P/N 701554-001KIT CONTENTS: HAND CONTROLLER, 3-WAY HIGH-PRESSURE STOPCOCK WITH ROTATING END AND PREMIUM HIGH-PRESSURE TUBING: Brand: ANGIOTOUCH® KIT

## (undated) DEVICE — LEGGINGS, PAIR, 29X43, STERILE: Brand: MEDLINE

## (undated) DEVICE — STPCK LP 1WY RA 200PSI

## (undated) DEVICE — DEV COMP RAD PRELUDESYNC 24CM

## (undated) DEVICE — GOWN,REINF,POLY,ECL,PP SLV,XL: Brand: MEDLINE

## (undated) DEVICE — BIT DRL 3FLUT QC CALIB 4.2X330X100MM

## (undated) DEVICE — PINNACLE INTRODUCER SHEATH: Brand: PINNACLE

## (undated) DEVICE — SPK10295 ORTHOPEDIC FRACTURE KIT: Brand: SPK10295 ORTHOPEDIC FRACTURE KIT

## (undated) DEVICE — MINI ACCESS KIT 4FR/SS GDWR: Brand: MEDLINE INDUSTRIES, INC.

## (undated) DEVICE — DRSNG WND GZ CURAD OIL EMULSION 3X8IN STRL PK/3

## (undated) DEVICE — PK CATH CARD 10

## (undated) DEVICE — C-ARM: Brand: UNBRANDED

## (undated) DEVICE — MICROCATHETER: Brand: TREVO TRAK 21

## (undated) DEVICE — KT VLV HEMO MAP ACC PLS LG/BORE MTL/INTRO W/TORQ/DEV

## (undated) DEVICE — LIMB HOLDER, WRIST/ANKLE: Brand: DEROYAL

## (undated) DEVICE — PAD ARMBRD SURG CONVOL 7.5X20X2IN

## (undated) DEVICE — 2 TIP PRE-SHAPED 45 MICROCATHETER: Brand: EXCELSIOR SL-10

## (undated) DEVICE — GLIDESHEATH SLENDER STAINLESS STEEL KIT: Brand: GLIDESHEATH SLENDER

## (undated) DEVICE — CATH TEMPO 5F BER 100CM: Brand: TEMPO